# Patient Record
Sex: MALE | Race: WHITE | NOT HISPANIC OR LATINO | Employment: OTHER | URBAN - METROPOLITAN AREA
[De-identification: names, ages, dates, MRNs, and addresses within clinical notes are randomized per-mention and may not be internally consistent; named-entity substitution may affect disease eponyms.]

---

## 2017-01-31 ENCOUNTER — OFFICE VISIT (OUTPATIENT)
Dept: INTERNAL MEDICINE | Facility: CLINIC | Age: 67
End: 2017-01-31

## 2017-01-31 VITALS
BODY MASS INDEX: 28.99 KG/M2 | WEIGHT: 214 LBS | DIASTOLIC BLOOD PRESSURE: 80 MMHG | HEIGHT: 72 IN | RESPIRATION RATE: 14 BRPM | SYSTOLIC BLOOD PRESSURE: 130 MMHG

## 2017-01-31 DIAGNOSIS — Z00.00 ANNUAL PHYSICAL EXAM: Primary | ICD-10-CM

## 2017-01-31 DIAGNOSIS — Z13.228 SCREENING FOR METABOLIC DISORDER: ICD-10-CM

## 2017-01-31 DIAGNOSIS — Z12.5 PROSTATE CANCER SCREENING: ICD-10-CM

## 2017-01-31 DIAGNOSIS — M54.89 OTHER BACK PAIN: ICD-10-CM

## 2017-01-31 LAB
25(OH)D3 SERPL-MCNC: 47.4 NG/ML (ref 30–100)
ALBUMIN SERPL-MCNC: 4.02 G/DL (ref 3.4–4.6)
ALBUMIN/GLOB SERPL: 1.3 G/DL
ALP SERPL-CCNC: 84 U/L (ref 46–116)
ALT SERPL W P-5'-P-CCNC: 42 U/L (ref 16–63)
ANION GAP SERPL CALCULATED.3IONS-SCNC: 8 MMOL/L
AST SERPL-CCNC: 38 U/L (ref 7–37)
BACTERIA UR QL AUTO: ABNORMAL /HPF
BILIRUB SERPL-MCNC: 0.8 MG/DL (ref 0.2–1)
BILIRUB UR QL STRIP: NEGATIVE
BUN BLD-MCNC: 24 MG/DL (ref 6–22)
BUN/CREAT SERPL: 23.1 (ref 7–25)
CALCIUM SPEC-SCNC: 9 MG/DL (ref 8.6–10.5)
CHLORIDE SERPL-SCNC: 102 MMOL/L (ref 95–107)
CLARITY UR: CLEAR
CO2 SERPL-SCNC: 30 MMOL/L (ref 23–32)
COLOR UR: YELLOW
CREAT BLD-MCNC: 1.04 MG/DL (ref 0.7–1.3)
DEPRECATED RDW RBC AUTO: 44.5 FL (ref 37–54)
ERYTHROCYTE [DISTWIDTH] IN BLOOD BY AUTOMATED COUNT: 13.2 % (ref 11.5–15)
ERYTHROCYTE [SEDIMENTATION RATE] IN BLOOD: 3 MM/HR (ref 0–20)
FIBRINOGEN PPP-MCNC: 330 MG/DL (ref 219–464)
GFR SERPL CREATININE-BSD FRML MDRD: 72 ML/MIN/1.73
GLOBULIN UR ELPH-MCNC: 3.1 GM/DL
GLUCOSE BLD-MCNC: 96 MG/DL (ref 70–100)
GLUCOSE UR STRIP-MCNC: NEGATIVE MG/DL
HBA1C MFR BLD: 6 % (ref 4–6)
HCT VFR BLD AUTO: 46.3 % (ref 40.1–51)
HGB BLD-MCNC: 15.7 G/DL (ref 13.7–17.5)
HGB UR QL STRIP.AUTO: ABNORMAL
HYALINE CASTS UR QL AUTO: ABNORMAL /LPF
KETONES UR QL STRIP: NEGATIVE
LEUKOCYTE ESTERASE UR QL STRIP.AUTO: NEGATIVE
MCH RBC QN AUTO: 31.7 PG (ref 26–34)
MCHC RBC AUTO-ENTMCNC: 33.9 G/DL (ref 31–37)
MCV RBC AUTO: 93.5 FL (ref 80–100)
MUCOUS THREADS URNS QL MICRO: ABNORMAL /HPF
NITRITE UR QL STRIP: NEGATIVE
PH UR STRIP.AUTO: 6.5 [PH] (ref 5–8)
PLATELET # BLD AUTO: 223 10*3/MM3 (ref 150–450)
PMV BLD AUTO: 11.2 FL (ref 6–12)
POTASSIUM BLD-SCNC: 4.6 MMOL/L (ref 3.3–5.3)
PROT SERPL-MCNC: 7.1 G/DL (ref 6.3–8.4)
PROT UR QL STRIP: ABNORMAL
PSA SERPL-MCNC: 1.36 NG/ML (ref 0.13–4)
RBC # BLD AUTO: 4.95 10*6/MM3 (ref 4.63–6.08)
RBC # UR: ABNORMAL /HPF
REF LAB TEST METHOD: ABNORMAL
SODIUM BLD-SCNC: 140 MMOL/L (ref 136–145)
SP GR UR STRIP: 1.02 (ref 1–1.03)
SQUAMOUS #/AREA URNS HPF: ABNORMAL /HPF
TSH SERPL DL<=0.05 MIU/L-ACNC: 3.03 MIU/ML (ref 0.4–4.2)
UROBILINOGEN UR QL STRIP: ABNORMAL
WBC NRBC COR # BLD: 7.83 10*3/MM3 (ref 5–10)
WBC UR QL AUTO: ABNORMAL /HPF

## 2017-01-31 PROCEDURE — 83036 HEMOGLOBIN GLYCOSYLATED A1C: CPT | Performed by: INTERNAL MEDICINE

## 2017-01-31 PROCEDURE — 36415 COLL VENOUS BLD VENIPUNCTURE: CPT | Performed by: INTERNAL MEDICINE

## 2017-01-31 PROCEDURE — 84153 ASSAY OF PSA TOTAL: CPT | Performed by: INTERNAL MEDICINE

## 2017-01-31 PROCEDURE — 85651 RBC SED RATE NONAUTOMATED: CPT | Performed by: INTERNAL MEDICINE

## 2017-01-31 PROCEDURE — 99396 PREV VISIT EST AGE 40-64: CPT | Performed by: INTERNAL MEDICINE

## 2017-01-31 PROCEDURE — 80050 GENERAL HEALTH PANEL: CPT | Performed by: INTERNAL MEDICINE

## 2017-01-31 PROCEDURE — 93000 ELECTROCARDIOGRAM COMPLETE: CPT | Performed by: INTERNAL MEDICINE

## 2017-01-31 PROCEDURE — 81001 URINALYSIS AUTO W/SCOPE: CPT | Performed by: INTERNAL MEDICINE

## 2017-01-31 NOTE — MR AVS SNAPSHOT
Branden Brady   2017 10:00 AM   Office Visit    Dept Phone:  [phone]   Encounter #:  [id number]    Provider:  Dariana Dalton MD   Department:  [facility] INTERNAL MEDICINE                Your Full Care Plan              Today's Medication Changes          These changes are accurate as of: 17 11:16 AM.  If you have any questions, ask your nurse or doctor.               Stop taking medication(s)listed here:     BREO ELLIPTA IN   Stopped by:  Dariana Dalton MD                      Your Updated Medication List          This list is accurate as of: 17 11:16 AM.  Always use your most recent med list.                aspirin 81 MG EC tablet       MULTIVITAMIN ADULT PO               We Performed the Following     Ambulatory Referral to Physical Therapy Evaluate and treat       You Were Diagnosed With        Codes Comments    Annual physical exam    -  Primary ICD-10-CM: Z00.00  ICD-9-CM: V70.0     Prostate cancer screening     ICD-10-CM: Z12.5  ICD-9-CM: V76.44     Screening for metabolic disorder     ICD-10-CM: Z13.228  ICD-9-CM: V77.99     Other back pain     ICD-10-CM: M54.89       Instructions     None    Patient Instructions History      Upcoming Appointments     Visit Type Date Time Department    PHYSICAL 2017 10:00 AM JOSE PATEL Psonar Signup     [facility] allows you to send messages to your doctor, view your test results, renew your prescriptions, schedule appointments, and more. To sign up, go to [employer] and click on the Sign Up Now link in the New User? box. Enter your [facility] Activation Code exactly as it appears below along with the last four digits of your Social Security Number and your Date of Birth () to complete the sign-up process. If you do not sign up before the expiration date, you must request a new code.    [facility] Activation Code: [email or web address]  Expires: 2017 11:16 AM    If you  "have questions, you can email [email or web address] or call [phone] to talk to our MyChart staff. Remember, [employer] is NOT to be used for urgent needs. For medical emergencies, dial 911.               Other Info from Your Visit           Your Appointments     Feb 02, 2018 10:00 AM EST   Welcome to Medicare with Dariana Dalton MD   [facility] INTERNAL MEDICINE (--)    [address] [address] [phone]              Allergies     Aspirin  Other (See Comments)    Stomach upset    Penicillins        Reason for Visit     Annual Exam physical       Vital Signs     Blood Pressure Respirations Height Weight Body Mass Index Smoking Status    130/80 (BP Location: Left arm, Patient Position: Sitting, Cuff Size: Adult) 14 72\" (182.9 cm) 214 lb (97.1 kg) 29.02 kg/m2 Never Smoker      Problems and Diagnoses Noted     Annual physical exam    -  Primary    Screening for prostate cancer        Screening for metabolic disorder        Back pain            "

## 2017-01-31 NOTE — PROGRESS NOTES
Chief Complaint   Patient presents with   • Annual Exam     physical         Subjective   Branden Brady is a 66 y.o. male     HPI: He is here for annual examination.  He generally feels healthy.  His  appetite is good.  He  is following a prudent diet. He is sleeping well.  His  energy is good.   He exercises regularly - either runs or swims.  He still has some shortness of breath with exertion. Pulmonary recommended an albuterol inhaler.  He sometimes uses it. Sometimes his back bothers him when he runs.  Takes ibuprofen as needed. Helps some.     In addition to the usual lab work we do for a complete physical, there are several other tests he would like done.  He has not had any specific symptoms that lead him to believe that there is anything wrong.  However, he has done some reading and would like these labs to be done.  He would like a DHEA level, estradiol level, fibrinogen, free testosterone, hemoglobin A1c.  He has had cardiac C-reactive protein, sedimentation rate, TSH, vitamin D, NMR lipo panel and homocystine levels done with past lab work and he would like those done as well.        The following portions of the patient's history were reviewed and updated as appropriate: allergies, current medications, past social history, problem list, past surgical history    Review of Systems   Constitutional: Negative for appetite change, chills, fatigue and fever.   HENT: Negative for congestion, ear pain, sinus pressure, sneezing, sore throat, tinnitus, trouble swallowing and voice change.    Eyes: Negative for visual disturbance.   Respiratory: Negative for cough and shortness of breath.    Cardiovascular: Negative for chest pain, palpitations and leg swelling.   Gastrointestinal: Negative for abdominal pain, constipation, diarrhea, nausea and vomiting.   Endocrine: Negative for cold intolerance, heat intolerance, polydipsia and polyuria.   Genitourinary: Negative for discharge, dysuria, frequency and scrotal  "swelling.        No ED   Musculoskeletal: Positive for back pain. Negative for arthralgias and gait problem.   Skin: Negative for rash.   Neurological: Negative for dizziness, syncope and headaches.   Hematological: Negative for adenopathy. Does not bruise/bleed easily.   Psychiatric/Behavioral: Negative for decreased concentration, dysphoric mood and sleep disturbance. The patient is not nervous/anxious.        Visit Vitals   • /80 (BP Location: Left arm, Patient Position: Sitting, Cuff Size: Adult)   • Resp 14   • Ht 72\" (182.9 cm)   • Wt 214 lb (97.1 kg)   • BMI 29.02 kg/m2        Objective   Physical Exam   Constitutional: He is oriented to person, place, and time. He appears well-developed and well-nourished. No distress.   HENT:   Right Ear: Hearing, tympanic membrane, external ear and ear canal normal.   Left Ear: Hearing, tympanic membrane, external ear and ear canal normal.   Nose: Right sinus exhibits no maxillary sinus tenderness and no frontal sinus tenderness. Left sinus exhibits no maxillary sinus tenderness and no frontal sinus tenderness.   Eyes: Conjunctivae, EOM and lids are normal. Pupils are equal, round, and reactive to light.   Neck: Trachea normal and phonation normal. Neck supple. Normal carotid pulses and no JVD present. Carotid bruit is not present. No thyroid mass and no thyromegaly present.   Cardiovascular: Normal rate, regular rhythm, S1 normal and S2 normal.  PMI is not displaced.  Exam reveals no gallop and no friction rub.    No murmur heard.  Pulses:       Carotid pulses are 2+ on the right side, and 2+ on the left side.       Radial pulses are 2+ on the right side, and 2+ on the left side.        Dorsalis pedis pulses are 2+ on the right side, and 2+ on the left side.   Pulmonary/Chest: Effort normal and breath sounds normal. He has no wheezes. He has no rhonchi. He has no rales. Chest wall is not dull to percussion.   Abdominal: Soft. Normal appearance, normal aorta and " bowel sounds are normal. He exhibits no abdominal bruit and no mass. There is no hepatosplenomegaly. There is no tenderness. Hernia confirmed negative in the right inguinal area and confirmed negative in the left inguinal area.   Genitourinary: Rectum normal, testes normal and penis normal. Rectal exam shows no mass and guaiac negative stool. Prostate is not enlarged and not tender.   Musculoskeletal: Normal range of motion. He exhibits no edema or tenderness.   Lymphadenopathy:     He has no cervical adenopathy.     He has no axillary adenopathy.        Left: No supraclavicular adenopathy present.   Neurological: He is alert and oriented to person, place, and time. He has normal strength and normal reflexes. No cranial nerve deficit or sensory deficit. Coordination and gait normal.   Skin: Skin is warm and dry. No rash noted.   Psychiatric: He has a normal mood and affect. His speech is normal and behavior is normal. Judgment and thought content normal. Cognition and memory are normal. He is attentive.   Nursing note and vitals reviewed.      ECG 12 Lead  Date/Time: 1/31/2017 10:56 AM  Performed by: JULIÁN NAIDU  Authorized by: CRISTIN NARANJO   Previous ECG: no previous ECG available  Rhythm: sinus bradycardia  Rate: bradycardic  BPM: 47  Conduction: conduction normal  ST Segments: ST segments normal  T Waves: T waves normal  QRS axis: normal  Clinical impression: normal ECG  Comments: Irregular sinus bradycardia                Assessment/Plan   Problem List Items Addressed This Visit     None      Visit Diagnoses     Annual physical exam    -  Primary    Relevant Orders    Comprehensive Metabolic Panel    CBC & Differential    Urinalysis With / Microscopic If Indicated    PSA    High Sensitivity CRP    Homocysteine, serum    Sedimentation Rate    Hemoglobin A1c    TSH    Vitamin D 25 Hydroxy    Fibrinogen    DHEA level    Testosterone Free Direct    Estradiol    Lipoprotein NMR    CBC Auto Differential     Lipoprotein NMR    Prostate cancer screening        Relevant Orders    PSA    Screening for metabolic disorder        Relevant Orders    Vitamin D 25 Hydroxy    Other back pain        Relevant Orders    Ambulatory Referral to Physical Therapy Evaluate and treat (Completed)

## 2017-02-01 LAB
BASOPHILS # BLD MANUAL: 0 10*3/MM3 (ref 0–0.2)
BASOPHILS NFR BLD AUTO: 0 % (ref 0–2)
CHOLEST SERPL-MCNC: 261 MG/DL (ref 100–199)
CRP SERPL HS-MCNC: 1 MG/L (ref 0–3)
EOSINOPHIL # BLD MANUAL: 0.23 10*3/MM3 (ref 0–0.7)
EOSINOPHIL NFR BLD MANUAL: 3 % (ref 0–5)
ESTRADIOL SERPL HS-MCNC: 17.8 PG/ML (ref 7.6–42.6)
HCYS SERPL-SCNC: 10.3 UMOL/L (ref 0–15)
HDL SERPL-SCNC: 40.8 UMOL/L
HDLC SERPL-MCNC: 79 MG/DL
LDL-P: 1523 NMOL/L
LDLC REAL SIZE PAT SERPL: 22 NM
LDLC SERPL CALC-MCNC: 167 MG/DL (ref 0–99)
LP-IR SCORE**: <25
LYMPHOCYTES # BLD MANUAL: 2.43 10*3/MM3 (ref 0.8–7)
LYMPHOCYTES NFR BLD MANUAL: 31 % (ref 10–60)
LYMPHOCYTES NFR BLD MANUAL: 9 % (ref 0–13)
MONOCYTES # BLD AUTO: 0.7 10*3/MM3 (ref 0–1)
NEUTROPHILS # BLD AUTO: 4.07 10*3/MM3 (ref 1–11)
NEUTROPHILS NFR BLD MANUAL: 52 % (ref 30–85)
PLAT MORPH BLD: NORMAL
RBC MORPH BLD: NORMAL
SCAN SLIDE: NORMAL
SMALL LDL-P: 253 NMOL/L
TRIGL SERPL-MCNC: 73 MG/DL (ref 0–149)
VARIANT LYMPHS NFR BLD MANUAL: 5 % (ref 0–5)
WBC MORPH BLD: NORMAL

## 2017-02-02 LAB — TESTOST FREE SERPL-MCNC: 5.2 PG/ML (ref 6.6–18.1)

## 2017-02-03 LAB — DHEA SERPL-MCNC: 122 NG/DL (ref 31–701)

## 2017-02-09 ENCOUNTER — TELEPHONE (OUTPATIENT)
Dept: INTERNAL MEDICINE | Facility: CLINIC | Age: 67
End: 2017-02-09

## 2017-02-09 NOTE — TELEPHONE ENCOUNTER
Patient called and left detailed message. Patient states Physical therapy would like patient to get a lumbar xray of PA and Lateral views. Due to patient being a runner, physical therapy states they would like to take a further look. Please advise and order if approved

## 2017-02-10 ENCOUNTER — APPOINTMENT (OUTPATIENT)
Dept: WOMENS IMAGING | Facility: HOSPITAL | Age: 67
End: 2017-02-10

## 2017-02-10 DIAGNOSIS — M54.89 OTHER BACK PAIN: Primary | ICD-10-CM

## 2017-02-10 PROCEDURE — 72050 X-RAY EXAM NECK SPINE 4/5VWS: CPT | Performed by: INTERNAL MEDICINE

## 2017-02-10 PROCEDURE — 72110 X-RAY EXAM L-2 SPINE 4/>VWS: CPT | Performed by: INTERNAL MEDICINE

## 2017-02-10 PROCEDURE — 72110 X-RAY EXAM L-2 SPINE 4/>VWS: CPT | Performed by: RADIOLOGY

## 2017-02-10 PROCEDURE — 72070 X-RAY EXAM THORAC SPINE 2VWS: CPT | Performed by: INTERNAL MEDICINE

## 2017-02-16 ENCOUNTER — TELEPHONE (OUTPATIENT)
Dept: INTERNAL MEDICINE | Facility: CLINIC | Age: 67
End: 2017-02-16

## 2017-02-16 NOTE — TELEPHONE ENCOUNTER
Patient is requesting an order for a Bone Density. Last one was done on 10/16/2012.    Please advise.    Thank you,    Vom

## 2017-02-16 NOTE — TELEPHONE ENCOUNTER
"----- Message from Maxine Burrell sent at 2/16/2017 12:11 PM EST -----  Patient is requesting a Bone Density. Patient stated he was not for sure when his last one was,but he \"had his last one here.\" Found his last one in [facility] in 10/2012. He would like to do this \"in the next few days when he picks up his XRay films.\" Thank you    [phone] (W)  [phone] (H)    "

## 2017-02-17 ENCOUNTER — CLINICAL SUPPORT (OUTPATIENT)
Dept: INTERNAL MEDICINE | Facility: CLINIC | Age: 67
End: 2017-02-17

## 2017-02-17 ENCOUNTER — HOSPITAL ENCOUNTER (OUTPATIENT)
Dept: PHYSICAL THERAPY | Facility: HOSPITAL | Age: 67
Setting detail: THERAPIES SERIES
Discharge: HOME | End: 2017-02-17

## 2017-02-17 DIAGNOSIS — M54.50 LUMBAR SPINE PAIN: ICD-10-CM

## 2017-02-17 DIAGNOSIS — Z13.820 SCREENING FOR OSTEOPOROSIS: ICD-10-CM

## 2017-02-17 DIAGNOSIS — M54.6 THORACIC SPINE PAIN: ICD-10-CM

## 2017-02-17 DIAGNOSIS — M48.54XG COMPRESSION FRACTURE OF THORACIC SPINE, NON-TRAUMATIC, WITH DELAYED HEALING, SUBSEQUENT ENCOUNTER: Primary | ICD-10-CM

## 2017-02-17 DIAGNOSIS — Z13.820 SCREENING FOR OSTEOPOROSIS: Primary | ICD-10-CM

## 2017-02-17 PROCEDURE — 97110 THERAPEUTIC EXERCISES: CPT | Performed by: PHYSICAL THERAPIST

## 2017-02-17 PROCEDURE — 97162 PT EVAL MOD COMPLEX 30 MIN: CPT | Performed by: PHYSICAL THERAPIST

## 2017-02-17 PROCEDURE — 77080 DXA BONE DENSITY AXIAL: CPT | Performed by: INTERNAL MEDICINE

## 2017-02-17 NOTE — PROGRESS NOTES
Outpatient Physical Therapy Ortho Initial Evaluation  [facility]     Patient Name: Branden Brady  : 1950  MRN: [id number]  Today's Date: 2017      Visit Date: 2017    Patient Active Problem List   Diagnosis   • Hyperlipidemia        Past Medical History   Diagnosis Date   • Arthritis      Cervical, thoracic and lumbar spine   • Dry eyes    • Exertional shortness of breath    • Family history of ischemic heart disease    • Hyperlipidemia    • Osteopenia    • Otalgia    • Subdeltoid bursitis    • Thoracic compression fracture         Past Surgical History   Procedure Laterality Date   • Shoulder surgery Right 2013     Right        Visit Dx:     ICD-10-CM ICD-9-CM   1. Compression fracture of thoracic spine, non-traumatic, with delayed healing, subsequent encounter M48.54XG V54.27   2. Lumbar spine pain M54.5 724.2   3. Thoracic spine pain M54.6 724.1             Patient History       17 1400          History    Chief Complaint Difficulty with daily activities;Pain  -ZK      Type of Pain Back pain  -ZK      Date Current Problem(s) Began --   insiduous early mid   -ZK      Brief Description of Current Complaint Patient with progressive back pain over the past 1-2 years really not feeling 100% since falling on the ice during spring of 2013. Landed on his right side and required rotator cuff surgery 2013 which went well and he quickly returned to running 2 months post surgery. However, in the past year his running has backed off from one hour to just 20 minutes before reporting a nagging mid to low back pain. At times able to stop and walk and then run again, but recently not able to  running again. Pt called 2 weeks ago to discuss his symptoms and I suggested a lumbar film be taken prior to PT eval. MD Darnell ordered a complete spine series and pt found to have DDD, scoliosis, and a T9 comp fx which is mild and age unknown. MD ordered bone density scan which pt  will have done next week. Pt continues to run in spite of this and looking for direction as to how to help his spine pain but keep running as possible.   -ZK      Patient/Caregiver Goals Return to prior level of function;Relieve pain;Know what to do to help the symptoms  -ZK      Patient's Rating of General Health Very good  -ZK      Occupation/sports/leisure activities Retired pharmacist for quite some time. Loves to run and now swimming some  -ZK      What clinical tests have you had for this problem? X-ray   see report in file regarding level of DDD, scoliosis,and fx  -ZK      Pain     Pain Location Back  -ZK      Pain at Present 1  -ZK      Pain at Best 0  -ZK      Pain at Worst 5  -ZK      What Performance Factors Make the Current Problem(s) WORSE? running (also see YUMIKO)  -ZK      What Performance Factors Make the Current Problem(s) BETTER? rest and back extension  -ZK      Pain Comments no night pain. no wt loss. did not report family hx spine pain  -ZK      Is your sleep disturbed? No  -ZK      Fall Risk Assessment    Any falls in the past year: No  -ZK      Daily Activities    Primary Language English  -ZK      Barriers to learning None  -ZK      Safety    Are you being hurt, hit, or frightened by anyone at home or in your life? No  -ZK      Are you being neglected by a caregiver No  -ZK        User Key  (r) = Recorded By, (t) = Taken By, (c) = Cosigned By    Initials Name Provider Type    ZK Zorre Zeno Kimura, PT Physical Therapist                PT Ortho       02/17/17 1500    Subjective Comments    Subjective Comments agrees to cut back on distance of running to 50% of current level until bone density study is in .   -ZK    Posture/Observations    Alignment Options Thoracic kyphosis;Scoliosis   mild mod T convex to the right and mild convex Lumb L  -ZK    Thoracic Kyphosis Mild  -ZK    Sensation    Sensation WNL? WNL  -ZK    Additional Comments no sensitivity to pressure at T8-12  -ZK    Quarter Clearing     Quarter Clearing Lower Quarter Clearing   hips clear  -ZK    ROM (Range of Motion)    General ROM head/neck/trunk range of motion deficits identified  -ZK    General ROM Detail 20 degree loss flexion with reduced lumbar mobility. 50% SB and ext from norm  -ZK    Flexibility    Flexibility Tested? Lower Extremity   -20 hamstring flex bilat  -ZK    Pathomechanics    Spine Pathomechanics Excessive thoracic kyphosis with forward bend;Limited lumbar flattening with forward bend  -ZK      User Key  (r) = Recorded By, (t) = Taken By, (c) = Cosigned By    Initials Name Provider Type    Z Zorre Zeno Kimura, PT Physical Therapist                                PT OP Goals       02/17/17 1500          PT Short Term Goals    STG Date to Achieve 03/17/17  -ZK      STG 1 Improve c/o pain with and after running from 5 to 3/10 or better  -ZK      STG 1 Progress New  -ZK      STG 2 Pt to be indep with light moderate program based on bone density findings  -ZK      STG 2 Progress New  -K      STG 3 Pt to state improved spinal ROM, mik with flexion  -ZK      STG 3 Progress New  -K      Long Term Goals    LTG Date to Achieve 04/17/17  -ZK      LTG 1 pt to return to running 30-40 minutes with no post ex LBP  -ZK      LTG 2 YUMIKO score to improve from 28 to < 18% by dc  -ZK      LTG 3 pt to be indep with moderate to advanced back rehab program  -ZK      LTG 4 pt to be satisfied with variety of ex to supplement and possible replace amount of running per week  -ZK      Time Calculation    PT Goal Re-Cert Due Date 03/17/17  -ZK        User Key  (r) = Recorded By, (t) = Taken By, (c) = Cosigned By    Initials Name Provider Type    K Zorre Zeno Kimura, PT Physical Therapist                PT Assessment/Plan       02/17/17 1509          PT Assessment    Functional Limitations Limitations in functional capacity and performance;Limitations in community activities;Performance in leisure activities;Performance in sport activities  -Z       Impairments Posture;Locomotion;Pain;Impaired flexibility  -ZK      Assessment Comments Pt with no real back issues until falling 3 years ago injuring his right shoulder and never regained his mobility or ability to run as before. Now with mild comp fx at T9 most likely insiduous from 2016 but prognosis and plan for ex will depend a bit on pending bone density study. Will see pt once a week the next month to advance program and pt may join this wellness center for alternative ex while getting some answers for his new condition/comp fx.   -RATNA      Please refer to paper survey for additional self-reported information Yes  -ZK      Rehab Potential Good  -ADDIEK      Patient/caregiver participated in establishment of treatment plan and goals Yes  -ZK      Patient would benefit from skilled therapy intervention Yes  -ZK      PT Plan    PT Frequency 1x/week  -ADDIEK      Predicted Duration of Therapy Intervention (days/wks) 4-6 weeks  -ADDIEK      Planned CPT's? PT AQUATIC THERAPY EA 15 MIN: 07231;PT MANUAL THERAPY EA 15 MIN: 23993;PT THER PROC EA 15 MIN: 17176  -ADDIEK        User Key  (r) = Recorded By, (t) = Taken By, (c) = Cosigned By    Initials Name Provider Type    ZK Zorre Zeno Kimura, PT Physical Therapist                  Exercises       02/17/17 1500          Subjective Comments    Subjective Comments agrees to cut back on distance of running to 50% of current level until bone density study is in .   -RATNA      Exercise 1    Exercise Name 1 see HEP in chart. started on hamstring flex, hook marches for core, trunk rotation supine. DKTC, cat and camel and child's pose with SB.  -RATNA        User Key  (r) = Recorded By, (t) = Taken By, (c) = Cosigned By    Initials Name Provider Type    ZK Zorre Zeno Kimura, PT Physical Therapist                              Outcome Measures       02/17/17 1500          Modified Oswestry    Modified Oswestry Score/Comments 28  -ZNATALI      Functional Assessment    Outcome Measure Options Gonsalo Whatley   -RATNA        User Key  (r) = Recorded By, (t) = Taken By, (c) = Cosigned By    Initials Name Provider Type    ZK Zorre Zeno Kimura, PT Physical Therapist            Time Calculation:   Start Time: 1315  Stop Time: 1415  Time Calculation (min): 60 min     Therapy Charges for Today     Code Description Service Date Service Provider Modifiers Qty    70904382135 HC PT THER PROC EA 15 MIN 2/17/2017 Zorre Zeno Kimura, PT GP 1    35157637804 HC PT EVAL MOD COMPLEXITY 3 2/17/2017 Zorre Zeno Kimura, PT GP 1          PT G-Codes  Outcome Measure Options: Modifed Owestry         Zorre Zeno Kimura, PT  2/17/2017

## 2017-02-17 NOTE — TELEPHONE ENCOUNTER
Judy,    Can you please call Doug Caitlyn and schedule him for a bone density. (the order is in the chart)    Thank you,    Von

## 2017-02-20 ENCOUNTER — TELEPHONE (OUTPATIENT)
Dept: INTERNAL MEDICINE | Facility: CLINIC | Age: 67
End: 2017-02-20

## 2017-02-20 NOTE — TELEPHONE ENCOUNTER
----- Message from Nirali Tirado MA sent at 2/20/2017  4:57 PM EST -----  Contact: pt      ----- Message -----     From: Judy Page     Sent: 2/20/2017   1:15 PM       To: Nirali Tirado MA    Pt calling would like a call back to discuss x-ray results and bone density results with Dr. Dalton when she returns. Please advise.     #[phone]      Dr. Dalton

## 2017-02-21 DIAGNOSIS — M54.89 OTHER BACK PAIN: Primary | ICD-10-CM

## 2017-02-21 NOTE — TELEPHONE ENCOUNTER
Discussed with him.  He has many questions, concerns.  Explained he has mild osteopenia.  There is evidence of a mild vertebral fracture of one thoracic vertebra.  He has back aching when he runs.  He does not want to stop running.  Recommended sports medicine consultation.

## 2017-02-24 ENCOUNTER — HOSPITAL ENCOUNTER (OUTPATIENT)
Dept: PHYSICAL THERAPY | Facility: HOSPITAL | Age: 67
Setting detail: THERAPIES SERIES
Discharge: HOME | End: 2017-02-24

## 2017-02-24 DIAGNOSIS — M48.54XG COMPRESSION FRACTURE OF THORACIC SPINE, NON-TRAUMATIC, WITH DELAYED HEALING, SUBSEQUENT ENCOUNTER: Primary | ICD-10-CM

## 2017-02-24 DIAGNOSIS — M54.50 LUMBAR SPINE PAIN: ICD-10-CM

## 2017-02-24 DIAGNOSIS — M54.6 THORACIC SPINE PAIN: ICD-10-CM

## 2017-02-24 PROCEDURE — 97110 THERAPEUTIC EXERCISES: CPT | Performed by: PHYSICAL THERAPIST

## 2017-02-24 NOTE — PROGRESS NOTES
Outpatient Physical Therapy Ortho Treatment Note  [facility]     Patient Name: Branden Brady  : 1950  MRN: [id number]  Today's Date: 2017      Visit Date: 2017    Visit Dx:    ICD-10-CM ICD-9-CM   1. Compression fracture of thoracic spine, non-traumatic, with delayed healing, subsequent encounter M48.54XG V54.27   2. Lumbar spine pain M54.5 724.2   3. Thoracic spine pain M54.6 724.1       Patient Active Problem List   Diagnosis   • Hyperlipidemia        Past Medical History   Diagnosis Date   • Arthritis      Cervical, thoracic and lumbar spine   • Dry eyes    • Exertional shortness of breath    • Family history of ischemic heart disease    • Hyperlipidemia    • Osteopenia    • Otalgia    • Subdeltoid bursitis    • Thoracic compression fracture         Past Surgical History   Procedure Laterality Date   • Shoulder surgery Right      Right                              PT Assessment/Plan       17 1238       PT Assessment    Assessment Comments see bone density findings for details regarding risk and recommendations. pt does have osteopenia and he is still not sure of the status of his T9 comp fx and called the radilogist who had the report but not the film so really did not get a full answer. pt will now pursue ortho or neurosurgical opinion just to get a more concise picture about his prognosis and comp fx. Pt with low normal calcium levels on recent CBC so should be able to pursue the 500mg dosage recommended for osteopenia.  He will ask his  more about this as needed.   -RATNA       User Key  (r) = Recorded By, (t) = Taken By, (c) = Cosigned By    Initials Name Provider Type    ZK Zorre Zeno Kimura, PT Physical Therapist                    Exercises       17 1200          Subjective Comments    Subjective Comments talked to MD after bone density and finding in Lumbar spine show moderate fx risk and MD advised optimal use of calcium right now, but also okay to confer with  orthopedist if needed. plans on joining this gym for 3 months thus started on learning about do's and don'ts here.  -ZK      Subjective Pain    Able to rate subjective pain? yes  -ZK      Pre-Treatment Pain Level 3  -ZK      Post-Treatment Pain Level 2  -ZK      Exercise 1    Exercise Name 1 intro to spinning benefits and seat set up to prevent kyphotic posture. benefits of TRX and pilates classes. intro to some free motion machines for core and mid back work. also how to use fins for swimming to prevent shoulder injury.   -ADDIEK        User Key  (r) = Recorded By, (t) = Taken By, (c) = Cosigned By    Initials Name Provider Type    ZK Zorre Zeno Kimura, VARGHESE Physical Therapist                                       Time Calculation:   Start Time: 1045  Stop Time: 1130  Time Calculation (min): 45 min    Therapy Charges for Today     Code Description Service Date Service Provider Modifiers Qty    27408505410 HC PT THER PROC EA 15 MIN 2/24/2017 Zorre Zeno Kimura, PT GP 3                    Zorre Zeno Kimura, PT  2/24/2017

## 2017-03-03 ENCOUNTER — APPOINTMENT (OUTPATIENT)
Dept: PHYSICAL THERAPY | Facility: HOSPITAL | Age: 67
End: 2017-03-03

## 2017-03-10 ENCOUNTER — APPOINTMENT (OUTPATIENT)
Dept: PHYSICAL THERAPY | Facility: HOSPITAL | Age: 67
End: 2017-03-10

## 2017-04-07 ENCOUNTER — DOCUMENTATION (OUTPATIENT)
Dept: PHYSICAL THERAPY | Facility: HOSPITAL | Age: 67
End: 2017-04-07

## 2017-04-07 NOTE — THERAPY DISCHARGE NOTE
Outpatient Physical Therapy Discharge Summary         Patient Name: Branden Brady  : 1950  MRN: [id number]    Today's Date: 2017    Visit Dx:  No diagnosis found.          PT OP Goals       17 1430       PT Short Term Goals    STG Date to Achieve 17  -ZK     STG 1 Improve c/o pain with and after running from 5 to 3/10 or better  -ZK     STG 1 Progress Progressing  -ZK     STG 2 Pt to be indep with light moderate program based on bone density findings  -ZK     STG 2 Progress Ongoing  -ZK     STG 3 Pt to state improved spinal ROM, mik with flexion  -ZK     STG 3 Progress Met  -ZK     Long Term Goals    LTG Date to Achieve 17  -ZK     LTG 1 pt to return to running 30-40 minutes with no post ex LBP  -ZK     LTG 1 Progress Ongoing  -ZK     LTG 2 YUMIKO score to improve from 28 to < 18% by dc  -ZK     LTG 2 Progress Ongoing  -ZK     LTG 3 pt to be indep with moderate to advanced back rehab program  -ZK     LTG 3 Progress Not Met  -ZK     LTG 4 pt to be satisfied with variety of ex to supplement and possible replace amount of running per week  -ZK     LTG 4 Progress Not Met  -ZK       User Key  (r) = Recorded By, (t) = Taken By, (c) = Cosigned By    Initials Name Provider Type    ZK Zorre Zeno Kimura, PT Physical Therapist          OP PT Discharge Summary  Date of Discharge: 17  Reason for Discharge: Patient/Caregiver request  Outcomes Achieved: Patient able to partially acheive established goals  Discharge Destination: Home without follow-up  Discharge Instructions: pt seen for eval and ex advice post chronic then progressive mid back pain found to have a T9 comp fx of unknown chronicity. Pt accepted some new exercises but slow to accept that he may have to reduce and even stop running in order to be pain free and not sustain other injuries. Pt also underwent a bone density scan and due to many questions he had regarding his findings on x-ray and general prognosis, I recommended he  consult further with an ortho and/or spine MD for more answers. Pt did not follow up for more advanced ex or suggestions how to progress running program, thus final condition??      Time Calculation:                    Zorre Zeno Kimura, PT  4/7/2017

## 2017-05-31 ENCOUNTER — OFFICE VISIT (OUTPATIENT)
Dept: NEUROSURGERY | Facility: CLINIC | Age: 67
End: 2017-05-31

## 2017-05-31 VITALS
HEART RATE: 66 BPM | DIASTOLIC BLOOD PRESSURE: 89 MMHG | HEIGHT: 72 IN | WEIGHT: 210 LBS | SYSTOLIC BLOOD PRESSURE: 143 MMHG | BODY MASS INDEX: 28.44 KG/M2

## 2017-05-31 DIAGNOSIS — G89.29 CHRONIC MIDLINE LOW BACK PAIN WITHOUT SCIATICA: ICD-10-CM

## 2017-05-31 DIAGNOSIS — M51.36 DDD (DEGENERATIVE DISC DISEASE), LUMBAR: Primary | ICD-10-CM

## 2017-05-31 DIAGNOSIS — M54.50 CHRONIC MIDLINE LOW BACK PAIN WITHOUT SCIATICA: ICD-10-CM

## 2017-05-31 PROBLEM — M51.369 DDD (DEGENERATIVE DISC DISEASE), LUMBAR: Status: ACTIVE | Noted: 2017-05-31

## 2017-05-31 PROCEDURE — 99203 OFFICE O/P NEW LOW 30 MIN: CPT | Performed by: NEUROLOGICAL SURGERY

## 2018-02-02 ENCOUNTER — OFFICE VISIT (OUTPATIENT)
Dept: INTERNAL MEDICINE | Facility: CLINIC | Age: 68
End: 2018-02-02

## 2018-02-02 VITALS
DIASTOLIC BLOOD PRESSURE: 72 MMHG | SYSTOLIC BLOOD PRESSURE: 114 MMHG | BODY MASS INDEX: 28.44 KG/M2 | WEIGHT: 210 LBS | HEIGHT: 72 IN

## 2018-02-02 DIAGNOSIS — Z00.00 WELCOME TO MEDICARE PREVENTIVE VISIT: Primary | ICD-10-CM

## 2018-02-02 DIAGNOSIS — Z12.5 PROSTATE CANCER SCREENING: ICD-10-CM

## 2018-02-02 DIAGNOSIS — E78.5 HYPERLIPIDEMIA, UNSPECIFIED HYPERLIPIDEMIA TYPE: ICD-10-CM

## 2018-02-02 LAB
ALBUMIN SERPL-MCNC: 4.5 G/DL (ref 3.5–5.2)
ALBUMIN/GLOB SERPL: 1.7 G/DL
ALP SERPL-CCNC: 54 U/L (ref 39–117)
ALT SERPL W P-5'-P-CCNC: 32 U/L (ref 1–41)
ANION GAP SERPL CALCULATED.3IONS-SCNC: 9.5 MMOL/L
AST SERPL-CCNC: 40 U/L (ref 1–40)
BACTERIA UR QL AUTO: ABNORMAL /HPF
BASOPHILS # BLD AUTO: 0.03 10*3/MM3 (ref 0–0.2)
BASOPHILS NFR BLD AUTO: 0.4 % (ref 0–2)
BILIRUB SERPL-MCNC: 0.6 MG/DL (ref 0.1–1.2)
BILIRUB UR QL STRIP: NEGATIVE
BUN BLD-MCNC: 19 MG/DL (ref 8–23)
BUN/CREAT SERPL: 18.8 (ref 7–25)
CALCIUM SPEC-SCNC: 9.3 MG/DL (ref 8.6–10.5)
CHLORIDE SERPL-SCNC: 103 MMOL/L (ref 98–107)
CHOLEST SERPL-MCNC: 256 MG/DL (ref 0–200)
CLARITY UR: CLEAR
CO2 SERPL-SCNC: 30.5 MMOL/L (ref 22–29)
COLOR UR: YELLOW
CREAT BLD-MCNC: 1.01 MG/DL (ref 0.76–1.27)
CRP SERPL-MCNC: 0.1 MG/DL (ref 0.01–0.5)
DEPRECATED RDW RBC AUTO: 45 FL (ref 37–54)
EOSINOPHIL # BLD AUTO: 0.2 10*3/MM3 (ref 0–0.7)
EOSINOPHIL NFR BLD AUTO: 2.9 % (ref 0–5)
ERYTHROCYTE [DISTWIDTH] IN BLOOD BY AUTOMATED COUNT: 13.4 % (ref 11.5–15)
ERYTHROCYTE [SEDIMENTATION RATE] IN BLOOD: 2 MM/HR (ref 0–20)
FIBRINOGEN PPP-MCNC: 338 MG/DL (ref 219–464)
GFR SERPL CREATININE-BSD FRML MDRD: 74 ML/MIN/1.73
GLOBULIN UR ELPH-MCNC: 2.6 GM/DL
GLUCOSE BLD-MCNC: 88 MG/DL (ref 65–99)
GLUCOSE UR STRIP-MCNC: NEGATIVE MG/DL
HCT VFR BLD AUTO: 46.7 % (ref 40.1–51)
HDLC SERPL-MCNC: 90 MG/DL (ref 40–60)
HGB BLD-MCNC: 15.8 G/DL (ref 13.7–17.5)
HGB UR QL STRIP.AUTO: ABNORMAL
HYALINE CASTS UR QL AUTO: ABNORMAL /LPF
KETONES UR QL STRIP: NEGATIVE
LDLC SERPL CALC-MCNC: 157 MG/DL (ref 0–100)
LDLC/HDLC SERPL: 1.74 {RATIO}
LEUKOCYTE ESTERASE UR QL STRIP.AUTO: NEGATIVE
LYMPHOCYTES # BLD AUTO: 2.09 10*3/MM3 (ref 0.8–7)
LYMPHOCYTES NFR BLD AUTO: 30.4 % (ref 10–60)
MCH RBC QN AUTO: 31.9 PG (ref 26–34)
MCHC RBC AUTO-ENTMCNC: 33.8 G/DL (ref 31–37)
MCV RBC AUTO: 94.2 FL (ref 80–100)
MONOCYTES # BLD AUTO: 0.7 10*3/MM3 (ref 0–1)
MONOCYTES NFR BLD AUTO: 10.2 % (ref 0–13)
MUCOUS THREADS URNS QL MICRO: ABNORMAL /HPF
NEUTROPHILS # BLD AUTO: 3.86 10*3/MM3 (ref 1–11)
NEUTROPHILS NFR BLD AUTO: 56.1 % (ref 30–85)
NITRITE UR QL STRIP: NEGATIVE
PH UR STRIP.AUTO: 6 [PH] (ref 5–8)
PLATELET # BLD AUTO: 217 10*3/MM3 (ref 150–450)
PMV BLD AUTO: 10.9 FL (ref 6–12)
POTASSIUM BLD-SCNC: 4.5 MMOL/L (ref 3.5–5.2)
PROT SERPL-MCNC: 7.1 G/DL (ref 6–8.5)
PROT UR QL STRIP: NEGATIVE
PSA SERPL-MCNC: 2.13 NG/ML (ref 0–4)
RBC # BLD AUTO: 4.96 10*6/MM3 (ref 4.63–6.08)
RBC # UR: ABNORMAL /HPF
REF LAB TEST METHOD: ABNORMAL
SODIUM BLD-SCNC: 143 MMOL/L (ref 136–145)
SP GR UR STRIP: 1.02 (ref 1–1.03)
SQUAMOUS #/AREA URNS HPF: ABNORMAL /HPF
T4 FREE SERPL-MCNC: 0.95 NG/DL (ref 0.93–1.7)
TRIGL SERPL-MCNC: 46 MG/DL (ref 0–150)
TSH SERPL DL<=0.05 MIU/L-ACNC: 4.37 MIU/ML (ref 0.27–4.2)
UROBILINOGEN UR QL STRIP: ABNORMAL
VLDLC SERPL-MCNC: 9.2 MG/DL (ref 5–40)
WBC NRBC COR # BLD: 6.88 10*3/MM3 (ref 5–10)
WBC UR QL AUTO: ABNORMAL /HPF

## 2018-02-02 PROCEDURE — 80061 LIPID PANEL: CPT | Performed by: INTERNAL MEDICINE

## 2018-02-02 PROCEDURE — 84439 ASSAY OF FREE THYROXINE: CPT | Performed by: INTERNAL MEDICINE

## 2018-02-02 PROCEDURE — 86141 C-REACTIVE PROTEIN HS: CPT | Performed by: INTERNAL MEDICINE

## 2018-02-02 PROCEDURE — 85025 COMPLETE CBC W/AUTO DIFF WBC: CPT | Performed by: INTERNAL MEDICINE

## 2018-02-02 PROCEDURE — G0402 INITIAL PREVENTIVE EXAM: HCPCS | Performed by: INTERNAL MEDICINE

## 2018-02-02 PROCEDURE — 84443 ASSAY THYROID STIM HORMONE: CPT | Performed by: INTERNAL MEDICINE

## 2018-02-02 PROCEDURE — 80053 COMPREHEN METABOLIC PANEL: CPT | Performed by: INTERNAL MEDICINE

## 2018-02-02 PROCEDURE — 85652 RBC SED RATE AUTOMATED: CPT | Performed by: INTERNAL MEDICINE

## 2018-02-02 PROCEDURE — G0102 PROSTATE CA SCREENING; DRE: HCPCS | Performed by: INTERNAL MEDICINE

## 2018-02-02 PROCEDURE — 85384 FIBRINOGEN ACTIVITY: CPT | Performed by: INTERNAL MEDICINE

## 2018-02-02 PROCEDURE — 81001 URINALYSIS AUTO W/SCOPE: CPT | Performed by: INTERNAL MEDICINE

## 2018-02-02 PROCEDURE — G0403 EKG FOR INITIAL PREVENT EXAM: HCPCS | Performed by: INTERNAL MEDICINE

## 2018-02-02 PROCEDURE — G0103 PSA SCREENING: HCPCS | Performed by: INTERNAL MEDICINE

## 2018-02-02 NOTE — PATIENT INSTRUCTIONS
Medicare Wellness  Personal Prevention Plan of Service     Date of Office Visit:  2018  Encounter Provider:  Dariana Dalton MD  Place of Service:  [facility] INTERNAL MEDICINE  Patient Name: Branden Brady  :  1950    As part of the Medicare Wellness portion of your visit today, we are providing you with this personalized preventive plan of services (PPPS). This plan is based upon recommendations of the [place] Preventive Services Task Force (USPSTF) and the Advisory Committee on Immunization Practices (ACIP).    This lists the preventive care services that should be considered, and provides dates of when you are due. Items listed as completed are up-to-date and do not require any further intervention.    Health Maintenance   Topic Date Due   • HEPATITIS C SCREENING  2016   • MEDICARE ANNUAL WELLNESS  2016   • COLONOSCOPY  2016   • PNEUMOCOCCAL VACCINES (65+ LOW/MEDIUM RISK) (1 of 2 - PCV13) 2017   • INFLUENZA VACCINE  2017   • LIPID PANEL  2018   • DXA SCAN  2019   • TDAP/TD VACCINES (2 - Td) 2021   • ZOSTER VACCINE  Completed   Check with Dr. Elaina Burrell in regard to next colonoscopy.  Your last one was done 2015 and some polyps were removed    No orders of the defined types were placed in this encounter.      No Follow-up on file.

## 2018-02-02 NOTE — PROGRESS NOTES
QUICK REFERENCE INFORMATION:  The ABCs of the Annual Wellness Visit    [facility]    HEALTH RISK ASSESSMENT    1950    Recent Hospitalizations:  No hospitalization(s) within the last year..      Current Medical Providers:  Patient Care Team:  Dariana Dalton MD as PCP - General  Dariana Dalton MD as PCP - Family Medicine      Smoking Status:  History   Smoking Status   • Never Smoker   Smokeless Tobacco   • Never Used       Alcohol Consumption:  History   Alcohol Use   • Yes     Comment: Socially        Depression Screen:   PHQ-2/PHQ-9 Depression Screening 2/2/2018   Little interest or pleasure in doing things 0   Feeling down, depressed, or hopeless 0   Trouble falling or staying asleep, or sleeping too much 0   Feeling tired or having little energy 0   Poor appetite or overeating 0   Feeling bad about yourself - or that you are a failure or have let yourself or your family down 0   Trouble concentrating on things, such as reading the newspaper or watching television 0   Moving or speaking so slowly that other people could have noticed. Or the opposite - being so fidgety or restless that you have been moving around a lot more than usual 0   Thoughts that you would be better off dead, or of hurting yourself in some way 0   Total Score 0       Health Habits and Functional and Cognitive Screening:  Functional & Cognitive Status 2/2/2018   Do you have difficulty preparing food and eating? No   Do you have difficulty bathing yourself, getting dressed or grooming yourself? No   Do you have difficulty using the toilet? No   Do you have difficulty moving around from place to place? No   Do you have trouble with steps or getting out of a bed or a chair? No   In the past year have you fallen or experienced a near fall? No   Current Diet Well Balanced Diet   Dental Exam Up to date   Eye Exam Up to date   Exercise (times per week) 5 times per week   Do you need help using the phone?  No   Are you deaf or do  you have serious difficulty hearing?  No   Do you need help with transportation? No   Do you need help shopping? No   Do you need help preparing meals?  No   Do you need help with housework?  No   Do you need help with laundry? No   Do you need help taking your medications? No   Do you need help managing money? No   Have you felt unusual stress, anger or loneliness in the last month? No   Who do you live with? Spouse   If you need help, do you have trouble finding someone available to you? No   Have you been bothered in the last four weeks by sexual problems? No   Do you have difficulty concentrating, remembering or making decisions? No           Does the patient have evidence of cognitive impairment? No    Aspirin use counseling? Taking ASA appropriately as indicated      Recent Lab Results:  CMP:  Lab Results   Component Value Date    BUN 24 (H) 01/31/2017    CREATININE 1.04 01/31/2017    EGFRIFNONA 72 01/31/2017    BCR 23.1 01/31/2017     01/31/2017    K 4.6 01/31/2017    CO2 30.0 01/31/2017    CALCIUM 9.0 01/31/2017    ALBUMIN 4.02 01/31/2017    LABIL2 1.3 01/31/2017    BILITOT 0.8 01/31/2017    ALKPHOS 84 01/31/2017    AST 38 (H) 01/31/2017    ALT 42 01/31/2017     Lipid Panel:  Lab Results   Component Value Date    TRIG 73 01/31/2017     HbA1c:  Lab Results   Component Value Date    HGBA1C 6.0 01/31/2017       Visual Acuity:  No exam data present    Age-appropriate Screening Schedule:  Refer to the list below for future screening recommendations based on patient's age, sex and/or medical conditions. Orders for these recommended tests are listed in the plan section. The patient has been provided with a written plan.    Health Maintenance   Topic Date Due   • COLONOSCOPY  04/20/2016   • PNEUMOCOCCAL VACCINES (65+ LOW/MEDIUM RISK) (1 of 2 - PCV13) 05/23/2017   • INFLUENZA VACCINE  08/01/2017   • LIPID PANEL  01/31/2018   • DXA SCAN  02/17/2019   • TDAP/TD VACCINES (2 - Td) 02/01/2021   • ZOSTER VACCINE   Completed        Subjective   History of Present Illness    Branden Brady is a 67 y.o. male an established patient presenting for a Welcome to Medicare Visit.     The following portions of the patient's history were reviewed and updated as appropriate: allergies, current medications, past family history, past medical history, past social history, past surgical history and problem list.    Outpatient Medications Prior to Visit   Medication Sig Dispense Refill   • aspirin 81 MG EC tablet Take 81 mg by mouth daily.     • Calcium Citrate-Vitamin D (CALCIUM + D PO) Take  by mouth.     • Multiple Vitamins-Minerals (MULTIVITAMIN ADULT PO) Take  by mouth.       No facility-administered medications prior to visit.        Patient Active Problem List   Diagnosis   • Hyperlipidemia   • Chronic midline low back pain without sciatica   • DDD (degenerative disc disease), lumbar       Advance Care Planning:  has an advance directive - a copy HAS NOT been provided. Have asked the patient to send this to us to add to record.    Identification of Risk Factors:  Risk factors include: no current risk factors.    Review of Systems   Constitutional: Negative for appetite change, chills, fatigue and fever.   HENT: Negative for congestion, ear pain, sinus pressure, sneezing, sore throat, tinnitus, trouble swallowing and voice change.    Eyes: Negative for visual disturbance.   Respiratory: Negative for cough and shortness of breath.    Cardiovascular: Negative for chest pain, palpitations and leg swelling.   Gastrointestinal: Negative for abdominal pain, constipation, diarrhea, nausea and vomiting.   Endocrine: Negative for cold intolerance, heat intolerance, polydipsia and polyuria.   Genitourinary: Negative for dysuria and frequency.   Musculoskeletal: Negative for arthralgias, back pain and gait problem.   Skin: Negative for rash.   Neurological: Negative for dizziness, syncope and headaches.   Hematological: Negative for adenopathy.  Does not bruise/bleed easily.   Psychiatric/Behavioral: Negative for decreased concentration, dysphoric mood and sleep disturbance. The patient is not nervous/anxious.        Compared to one year ago, the patient feels his physical health is the same.  Compared to one year ago, the patient feels his mental health is the same.    Objective    Physical Exam   Constitutional: He is oriented to person, place, and time. He appears well-developed and well-nourished. No distress.   HENT:   Right Ear: Hearing, tympanic membrane, external ear and ear canal normal.   Left Ear: Hearing, tympanic membrane, external ear and ear canal normal.   Nose: Right sinus exhibits no maxillary sinus tenderness and no frontal sinus tenderness. Left sinus exhibits no maxillary sinus tenderness and no frontal sinus tenderness.   Eyes: Conjunctivae, EOM and lids are normal. Pupils are equal, round, and reactive to light.   Neck: Trachea normal and phonation normal. Neck supple. Normal carotid pulses and no JVD present. Carotid bruit is not present. No thyroid mass and no thyromegaly present.   Cardiovascular: Normal rate, regular rhythm, S1 normal and S2 normal.  PMI is not displaced.  Exam reveals no gallop and no friction rub.    No murmur heard.  Pulses:       Carotid pulses are 2+ on the right side, and 2+ on the left side.       Radial pulses are 2+ on the right side, and 2+ on the left side.        Dorsalis pedis pulses are 2+ on the right side, and 2+ on the left side.   Pulmonary/Chest: Effort normal and breath sounds normal. He has no wheezes. He has no rhonchi. He has no rales. Chest wall is not dull to percussion.   Abdominal: Soft. Normal appearance, normal aorta and bowel sounds are normal. He exhibits no abdominal bruit and no mass. There is no hepatosplenomegaly. There is no tenderness.   Genitourinary: Rectum normal. Rectal exam shows guaiac negative stool. Prostate is enlarged.   Musculoskeletal: Normal range of motion. He  "exhibits no edema or tenderness.   Lymphadenopathy:     He has no cervical adenopathy.        Right: No supraclavicular adenopathy present.        Left: No supraclavicular adenopathy present.   Neurological: He is alert and oriented to person, place, and time. He has normal strength and normal reflexes. No cranial nerve deficit or sensory deficit. Coordination normal.   Skin: Skin is warm and dry. No rash noted.   Psychiatric: He has a normal mood and affect. His speech is normal and behavior is normal. Judgment and thought content normal. Cognition and memory are normal. He is attentive.   Nursing note and vitals reviewed.      Vitals:    02/02/18 1001   BP: 114/72   Weight: 95.3 kg (210 lb)   Height: 182.9 cm (72\")       Body mass index is 28.48 kg/(m^2).  Discussed the patient's BMI with him. BMI is above normal parameters. Follow-up plan includes:  exercise counseling and nutrition counseling.    Procedure     ECG 12 Lead  Date/Time: 2/2/2018 10:30 AM  Performed by: EDWARD GARCIA  Authorized by: CRISTIN NARANJO   Comparison: compared with previous ECG from 1/31/2017  Similar to previous ECG  Rhythm comments: irregular sinus bradycardia  Rate: bradycardic  BPM: 57  ST Segments: ST segments normal  T Waves: T waves normal  QRS axis: normal  Clinical impression: non-specific ECG               Assessment/Plan   Patient Self-Management and Personalized Health Advice  The patient has been provided with information about: diet and exercise and preventive services including:   · Screening for AAA, referral for ultrasound placed, He states he has had both pneumonia vaccines.    Visit Diagnoses:  No diagnosis found.    No orders of the defined types were placed in this encounter.      Outpatient Encounter Prescriptions as of 2/2/2018   Medication Sig Dispense Refill   • aspirin 81 MG EC tablet Take 81 mg by mouth daily.     • Calcium Citrate-Vitamin D (CALCIUM + D PO) Take  by mouth.     • Multiple Vitamins-Minerals " (MULTIVITAMIN ADULT PO) Take  by mouth.       No facility-administered encounter medications on file as of 2/2/2018.        Reviewed use of high risk medication in the elderly: not applicable  Reviewed for potential of harmful drug interactions in the elderly: not applicable    Follow Up:  No Follow-up on file.     An After Visit Summary and PPPS with all of these plans were given to the patient.

## 2018-02-03 LAB — ESTRADIOL SERPL HS-MCNC: 31.7 PG/ML (ref 7.6–42.6)

## 2018-02-04 LAB
TESTOST FREE SERPL-MCNC: 8.8 PG/ML (ref 6.6–18.1)
TESTOST SERPL-MCNC: 813 NG/DL (ref 264–916)

## 2018-02-06 LAB — DHEA SERPL-MCNC: 84 NG/DL (ref 31–701)

## 2018-03-15 ENCOUNTER — TELEPHONE (OUTPATIENT)
Dept: INTERNAL MEDICINE | Facility: CLINIC | Age: 68
End: 2018-03-15

## 2018-03-15 NOTE — TELEPHONE ENCOUNTER
----- Message from Kika Wallace sent at 3/15/2018  1:43 PM EDT -----  Contact: Patient  Patient called stating he had congestion and the  Always calls in  doxycycline 100 mg for him. Please advise    Patient: [phone]    Pharmacy:[facility] [id number] [place], [place] [address]  AT [place] [address] - [phone]  - [phone] FX

## 2018-03-16 ENCOUNTER — TELEPHONE (OUTPATIENT)
Dept: INTERNAL MEDICINE | Facility: CLINIC | Age: 68
End: 2018-03-16

## 2018-03-16 DIAGNOSIS — J06.9 URI, ACUTE: Primary | ICD-10-CM

## 2018-03-16 RX ORDER — DOXYCYCLINE HYCLATE 100 MG/1
100 TABLET, DELAYED RELEASE ORAL 2 TIMES DAILY
Qty: 14 TABLET | Refills: 0 | Status: SHIPPED | OUTPATIENT
Start: 2018-03-16 | End: 2018-03-16 | Stop reason: SDUPTHER

## 2018-03-16 RX ORDER — DOXYCYCLINE HYCLATE 100 MG
100 TABLET ORAL 2 TIMES DAILY
Qty: 14 TABLET | Refills: 0 | Status: SHIPPED | OUTPATIENT
Start: 2018-03-16 | End: 2018-10-16

## 2018-03-16 RX ORDER — DOXYCYCLINE HYCLATE 100 MG/1
100 TABLET, DELAYED RELEASE ORAL 2 TIMES DAILY
Qty: 14 TABLET | Refills: 0 | Status: SHIPPED | OUTPATIENT
Start: 2018-03-16 | End: 2018-10-16

## 2018-03-16 NOTE — TELEPHONE ENCOUNTER
----- Message from Kika Wallace sent at 3/16/2018  3:01 PM EDT -----  Contact: Patient  Patient  Would like the regular doxycycline 100 mg. Please advise    Patient:[phone]    Pharmacy:[facility] [address] [place] [address] AT [facility] - [phone]  - [phone] FX

## 2018-05-10 ENCOUNTER — TELEPHONE (OUTPATIENT)
Dept: NEUROSURGERY | Facility: CLINIC | Age: 68
End: 2018-05-10

## 2018-05-10 NOTE — TELEPHONE ENCOUNTER
Patient called back to the office as he called a few hours ago and said he had not heard anything back. I explained Dr. Roland is currently seeing patient's and he will typically answer his messages at the end of the day. He said well I am in severe pain and I need something for this pain. I explained we would more than likely not be able to give him anything for pain as it has been almost a year and when we seen him then it was for his back pain not his neck therefore he will need to be seen in the office before we can do order anything. I also told him it is typically our office policy to not prescribe any pain medications unless a patient has had surgery. I scheduled him next week with Abimbola on 5/17/2018. He still requested a message be sent to Dr. Roland, he then states that Thursday's appointment is not good enough and that he will just go to the gym and ask Dr. Roland for something there. I told him that was not really appropriate. I advised him if he is in such severe pain he should contact his PCP or go to an urgent care or ER to be evaluated. He said he does not have a PCP and he already knows he can do the others but will not. He again requested a message be sent to Dr. Roland and then hung up.

## 2018-05-10 NOTE — TELEPHONE ENCOUNTER
Pt called this am c/o neck pain that started 48 hours ago that started on the left side then last night moved to the right. He denies any numbness and tingling or bladder or bowel issues.     He says that he has been using a heating pad and taking ibuprofen 200 mg every 6 hours.

## 2018-05-10 NOTE — TELEPHONE ENCOUNTER
Phone in a MDP tomorrow. Visit with Abimbola is fine. Tell him there may be a pinched nerve in the neck. If he can't wait until 5/17/18, then he should go to ER.

## 2018-05-11 RX ORDER — METHYLPREDNISOLONE 4 MG/1
TABLET ORAL
Qty: 21 TABLET | Refills: 0 | Status: SHIPPED | OUTPATIENT
Start: 2018-05-11 | End: 2018-10-16

## 2018-05-11 RX ORDER — METHYLPREDNISOLONE 4 MG/1
TABLET ORAL
Qty: 21 TABLET | Refills: 0 | Status: SHIPPED | OUTPATIENT
Start: 2018-05-11 | End: 2018-05-11 | Stop reason: SDUPTHER

## 2018-05-11 NOTE — TELEPHONE ENCOUNTER
Patient called back and states the [facility] we sent his RX to their computers are down and unsure what time they will be back up. He requested we re-send the RX to [facility] in [place]. RX was sent to new pharmacy.

## 2018-09-06 ENCOUNTER — TELEPHONE (OUTPATIENT)
Dept: INTERNAL MEDICINE | Facility: CLINIC | Age: 68
End: 2018-09-06

## 2018-09-06 DIAGNOSIS — R79.89 ELEVATED TSH: Primary | ICD-10-CM

## 2018-09-06 DIAGNOSIS — E78.5 HYPERLIPIDEMIA, UNSPECIFIED HYPERLIPIDEMIA TYPE: ICD-10-CM

## 2018-09-06 NOTE — TELEPHONE ENCOUNTER
----- Message from Sandra Torres sent at 9/6/2018 11:25 AM EDT -----  Contact: pt  Pt was asked to come back in 6 months for TSH, would like to know if he can get  CMP  CBC   LIPIDS     Pt would like a call to make sure these get ordred.  Pt# [phone]

## 2018-09-13 ENCOUNTER — LAB (OUTPATIENT)
Dept: INTERNAL MEDICINE | Facility: CLINIC | Age: 68
End: 2018-09-13

## 2018-09-13 DIAGNOSIS — E78.5 HYPERLIPIDEMIA, UNSPECIFIED HYPERLIPIDEMIA TYPE: ICD-10-CM

## 2018-09-13 DIAGNOSIS — R79.89 ELEVATED TSH: ICD-10-CM

## 2018-09-13 LAB
ALBUMIN SERPL-MCNC: 3.9 G/DL (ref 3.5–5.2)
ALBUMIN/GLOB SERPL: 1.3 G/DL
ALP SERPL-CCNC: 56 U/L (ref 39–117)
ALT SERPL W P-5'-P-CCNC: 26 U/L (ref 1–41)
ANION GAP SERPL CALCULATED.3IONS-SCNC: 8.9 MMOL/L
AST SERPL-CCNC: 34 U/L (ref 1–40)
BASOPHILS # BLD AUTO: 0.04 10*3/MM3 (ref 0–0.2)
BASOPHILS NFR BLD AUTO: 0.6 % (ref 0–2)
BILIRUB SERPL-MCNC: 0.7 MG/DL (ref 0.1–1.2)
BUN BLD-MCNC: 20 MG/DL (ref 8–23)
BUN/CREAT SERPL: 18.2 (ref 7–25)
CALCIUM SPEC-SCNC: 9.3 MG/DL (ref 8.6–10.5)
CHLORIDE SERPL-SCNC: 105 MMOL/L (ref 98–107)
CHOLEST SERPL-MCNC: 216 MG/DL (ref 0–200)
CO2 SERPL-SCNC: 27.1 MMOL/L (ref 22–29)
CREAT BLD-MCNC: 1.1 MG/DL (ref 0.76–1.27)
DEPRECATED RDW RBC AUTO: 45.4 FL (ref 37–54)
EOSINOPHIL # BLD AUTO: 0.22 10*3/MM3 (ref 0–0.7)
EOSINOPHIL NFR BLD AUTO: 3.3 % (ref 0–5)
ERYTHROCYTE [DISTWIDTH] IN BLOOD BY AUTOMATED COUNT: 13.5 % (ref 11.5–15)
GFR SERPL CREATININE-BSD FRML MDRD: 67 ML/MIN/1.73
GLOBULIN UR ELPH-MCNC: 3 GM/DL
GLUCOSE BLD-MCNC: 103 MG/DL (ref 65–99)
HCT VFR BLD AUTO: 44.6 % (ref 40.1–51)
HDLC SERPL-MCNC: 75 MG/DL (ref 40–60)
HGB BLD-MCNC: 15.2 G/DL (ref 13.7–17.5)
LDLC SERPL CALC-MCNC: 132 MG/DL (ref 0–100)
LDLC/HDLC SERPL: 1.76 {RATIO}
LYMPHOCYTES # BLD AUTO: 2.46 10*3/MM3 (ref 0.8–7)
LYMPHOCYTES NFR BLD AUTO: 37.1 % (ref 10–60)
MCH RBC QN AUTO: 32.3 PG (ref 26–34)
MCHC RBC AUTO-ENTMCNC: 34.1 G/DL (ref 31–37)
MCV RBC AUTO: 94.9 FL (ref 80–100)
MONOCYTES # BLD AUTO: 0.67 10*3/MM3 (ref 0–1)
MONOCYTES NFR BLD AUTO: 10.1 % (ref 0–13)
NEUTROPHILS # BLD AUTO: 3.24 10*3/MM3 (ref 1–11)
NEUTROPHILS NFR BLD AUTO: 48.9 % (ref 30–85)
PLATELET # BLD AUTO: 193 10*3/MM3 (ref 150–450)
PMV BLD AUTO: 10.4 FL (ref 6–12)
POTASSIUM BLD-SCNC: 4.8 MMOL/L (ref 3.5–5.2)
PROT SERPL-MCNC: 6.9 G/DL (ref 6–8.5)
RBC # BLD AUTO: 4.7 10*6/MM3 (ref 4.63–6.08)
SODIUM BLD-SCNC: 141 MMOL/L (ref 136–145)
T4 FREE SERPL-MCNC: 1.01 NG/DL (ref 0.93–1.7)
TRIGL SERPL-MCNC: 44 MG/DL (ref 0–150)
TSH SERPL DL<=0.05 MIU/L-ACNC: 4.79 MIU/ML (ref 0.27–4.2)
VLDLC SERPL-MCNC: 8.8 MG/DL (ref 5–40)
WBC NRBC COR # BLD: 6.63 10*3/MM3 (ref 5–10)

## 2018-09-13 PROCEDURE — 85025 COMPLETE CBC W/AUTO DIFF WBC: CPT | Performed by: INTERNAL MEDICINE

## 2018-09-13 PROCEDURE — 84439 ASSAY OF FREE THYROXINE: CPT | Performed by: INTERNAL MEDICINE

## 2018-09-13 PROCEDURE — 80061 LIPID PANEL: CPT | Performed by: INTERNAL MEDICINE

## 2018-09-13 PROCEDURE — 36415 COLL VENOUS BLD VENIPUNCTURE: CPT | Performed by: INTERNAL MEDICINE

## 2018-09-13 PROCEDURE — 80053 COMPREHEN METABOLIC PANEL: CPT | Performed by: INTERNAL MEDICINE

## 2018-09-13 PROCEDURE — 84443 ASSAY THYROID STIM HORMONE: CPT | Performed by: INTERNAL MEDICINE

## 2018-10-16 ENCOUNTER — OFFICE VISIT (OUTPATIENT)
Dept: INTERNAL MEDICINE | Facility: CLINIC | Age: 68
End: 2018-10-16

## 2018-10-16 VITALS
HEIGHT: 71 IN | RESPIRATION RATE: 16 BRPM | OXYGEN SATURATION: 99 % | WEIGHT: 207.8 LBS | HEART RATE: 70 BPM | BODY MASS INDEX: 29.09 KG/M2 | SYSTOLIC BLOOD PRESSURE: 122 MMHG | DIASTOLIC BLOOD PRESSURE: 82 MMHG

## 2018-10-16 DIAGNOSIS — R07.81 RIB PAIN ON LEFT SIDE: Primary | ICD-10-CM

## 2018-10-16 PROCEDURE — 99212 OFFICE O/P EST SF 10 MIN: CPT | Performed by: INTERNAL MEDICINE

## 2018-10-16 NOTE — PROGRESS NOTES
"Chief Complaint   Patient presents with   • Rib Pain     Left side rib pain after a fall from jogging last Saturday.       Subjective   Branden Brady is a 68 y.o. male.     History of Present Illness       On Saturday, 4 days ago, he fell while jogging.  He fell on the sidewalk.  He fell on his left side and has had discomfort over his left chest wall since then.  He has not had trouble breathing.  He did not noticed any visible bruising.  He was able to get up, walk for short distance and then continue jogging.  The discomfort persists.  He finds it uncomfortable to lie on his left side at night.      The following portions of the patient's history were reviewed and updated as appropriate: allergies, current medications, past family history, past medical history, past social history, past surgical history and problem list.    Review of Systems   Respiratory: Negative for cough and shortness of breath.    Gastrointestinal: Negative for abdominal distention, abdominal pain, nausea and vomiting.         Current Outpatient Prescriptions:   •  aspirin 81 MG EC tablet, Take 81 mg by mouth daily., Disp: , Rfl:   •  Calcium Citrate-Vitamin D (CALCIUM + D PO), Take  by mouth., Disp: , Rfl:   •  Multiple Vitamins-Minerals (MULTIVITAMIN ADULT PO), Take  by mouth., Disp: , Rfl:         Objective     /82 (BP Location: Left arm, Patient Position: Sitting, Cuff Size: Adult)   Pulse 70   Resp 16   Ht 179.5 cm (70.65\")   Wt 94.3 kg (207 lb 12.8 oz)   SpO2 99%   BMI 29.27 kg/m²     Physical Exam   Constitutional: He appears well-developed and well-nourished. No distress.   Cardiovascular: Normal rate, regular rhythm and normal heart sounds.  Exam reveals no gallop and no friction rub.    No murmur heard.  Pulmonary/Chest: Effort normal and breath sounds normal. No respiratory distress. He has no wheezes. He has no rhonchi. He has no rales. Chest wall is not dull to percussion. He exhibits bony tenderness (over left " lateral lower rib cage).   Nursing note and vitals reviewed.        Assessment/Plan   Branden was seen today for rib pain.    Diagnoses and all orders for this visit:    Rib pain on left side      Discussed.  His left lower rib cage is tender to palpation laterally.  There is no bony crepitus.  He may have bruised his ribs.  There is no external bruising visible.  Advised him this might take another week or 2 to completely resolve.  It is okay for him to continue his usual activities.  He may use over-the-counter analgesic products such as lidocaine patch, Aspercreme.

## 2018-10-25 ENCOUNTER — TELEPHONE (OUTPATIENT)
Dept: INTERNAL MEDICINE | Facility: CLINIC | Age: 68
End: 2018-10-25

## 2018-10-25 DIAGNOSIS — R07.81 RIB PAIN ON LEFT SIDE: Primary | ICD-10-CM

## 2018-10-25 NOTE — TELEPHONE ENCOUNTER
----- Message from Kika Wallace sent at 10/25/2018 10:34 AM EDT -----  Contact: Patient  Patient calling would like to get an x-ray of his left side he was in to see Dr. Dalton about this on 10/16 and the pain is getting worse. Please advise    Patient:[phone]

## 2018-10-26 ENCOUNTER — HOSPITAL ENCOUNTER (OUTPATIENT)
Dept: GENERAL RADIOLOGY | Facility: HOSPITAL | Age: 68
Discharge: HOME | End: 2018-10-26
Attending: INTERNAL MEDICINE | Admitting: INTERNAL MEDICINE

## 2018-10-26 ENCOUNTER — TELEPHONE (OUTPATIENT)
Dept: INTERNAL MEDICINE | Facility: CLINIC | Age: 68
End: 2018-10-26

## 2018-10-26 ENCOUNTER — HOSPITAL ENCOUNTER (OUTPATIENT)
Dept: GENERAL RADIOLOGY | Facility: HOSPITAL | Age: 68
Discharge: HOME | End: 2018-10-26
Attending: INTERNAL MEDICINE

## 2018-10-26 DIAGNOSIS — R07.81 RIB PAIN ON LEFT SIDE: ICD-10-CM

## 2018-10-26 DIAGNOSIS — R07.81 RIB PAIN ON LEFT SIDE: Primary | ICD-10-CM

## 2018-10-26 PROCEDURE — 71100 X-RAY EXAM RIBS UNI 2 VIEWS: CPT

## 2018-10-26 PROCEDURE — 71046 X-RAY EXAM CHEST 2 VIEWS: CPT

## 2018-10-26 NOTE — TELEPHONE ENCOUNTER
I recommend against pushing wheelchairs.  I think just walking for exercise would be the best thing right now.

## 2018-10-26 NOTE — TELEPHONE ENCOUNTER
----- Message from Sarah Burnett sent at 10/26/2018 12:11 PM EDT -----  Contact: Patient   Patient calling requesting imaging results when we get them.  Chest and ribs done today.  Thanks.    Patient:  [phone]

## 2018-10-26 NOTE — TELEPHONE ENCOUNTER
Pt informed.  Asked what type of exercise you think would be okay for him to do as he is healing.  He said he went for a run and it exacerbated the pain.  He just does not want to make it worse.  He is volunteering Tuesday at [facility] at will be pushing wheelchairs.  Is that okay?  Please advise on exercise.  Thank you.

## 2018-11-20 ENCOUNTER — TELEPHONE (OUTPATIENT)
Dept: INTERNAL MEDICINE | Facility: CLINIC | Age: 68
End: 2018-11-20

## 2018-11-20 DIAGNOSIS — J06.9 URI, ACUTE: ICD-10-CM

## 2018-11-20 RX ORDER — DOXYCYCLINE HYCLATE 100 MG/1
100 TABLET, DELAYED RELEASE ORAL 2 TIMES DAILY
Qty: 14 TABLET | Refills: 0 | Status: SHIPPED | OUTPATIENT
Start: 2018-11-20 | End: 2019-02-05

## 2018-11-20 NOTE — TELEPHONE ENCOUNTER
----- Message from Venecia Bernardo sent at 11/20/2018  9:39 AM EST -----  Pt calling requesting a Rx of Doxycycline Hyclate 100 mg. Twice a day for 10 days.  Pt says he gets this about once a year.  He has non productive dry cough, sore throat, nasal drainage for 7 days.  Has not taken temperature.  Has taken tylenol, guaifenesin     He would like Rx sent to [facility] [phone]    Pt# [phone]

## 2019-02-05 ENCOUNTER — OFFICE VISIT (OUTPATIENT)
Dept: INTERNAL MEDICINE | Facility: CLINIC | Age: 69
End: 2019-02-05

## 2019-02-05 VITALS
HEIGHT: 71 IN | BODY MASS INDEX: 29.26 KG/M2 | SYSTOLIC BLOOD PRESSURE: 130 MMHG | DIASTOLIC BLOOD PRESSURE: 80 MMHG | WEIGHT: 209 LBS

## 2019-02-05 DIAGNOSIS — M54.9 BACK PAIN, UNSPECIFIED BACK LOCATION, UNSPECIFIED BACK PAIN LATERALITY, UNSPECIFIED CHRONICITY: ICD-10-CM

## 2019-02-05 DIAGNOSIS — R73.01 ELEVATED FASTING GLUCOSE: ICD-10-CM

## 2019-02-05 DIAGNOSIS — Z00.00 MEDICARE ANNUAL WELLNESS VISIT, SUBSEQUENT: Primary | ICD-10-CM

## 2019-02-05 DIAGNOSIS — Z11.59 SCREENING FOR VIRAL DISEASE: ICD-10-CM

## 2019-02-05 DIAGNOSIS — R79.89 ELEVATED TSH: ICD-10-CM

## 2019-02-05 DIAGNOSIS — Z12.11 ENCOUNTER FOR HEMOCCULT SCREENING: ICD-10-CM

## 2019-02-05 DIAGNOSIS — Z12.5 PROSTATE CANCER SCREENING: ICD-10-CM

## 2019-02-05 DIAGNOSIS — E78.5 HYPERLIPIDEMIA, UNSPECIFIED HYPERLIPIDEMIA TYPE: ICD-10-CM

## 2019-02-05 DIAGNOSIS — M85.88 OSTEOPENIA OF LUMBAR SPINE: ICD-10-CM

## 2019-02-05 LAB
25(OH)D3 SERPL-MCNC: 66.1 NG/ML (ref 30–100)
ALBUMIN SERPL-MCNC: 4.6 G/DL (ref 3.5–5.2)
ALBUMIN/GLOB SERPL: 1.5 G/DL
ALP SERPL-CCNC: 101 U/L (ref 39–117)
ALT SERPL W P-5'-P-CCNC: 28 U/L (ref 1–41)
ANION GAP SERPL CALCULATED.3IONS-SCNC: 12.4 MMOL/L
AST SERPL-CCNC: 35 U/L (ref 1–40)
BACTERIA UR QL AUTO: ABNORMAL /HPF
BASOPHILS # BLD AUTO: 0.03 10*3/MM3 (ref 0–0.2)
BASOPHILS NFR BLD AUTO: 0.4 % (ref 0–2)
BILIRUB SERPL-MCNC: 0.7 MG/DL (ref 0.1–1.2)
BILIRUB UR QL STRIP: NEGATIVE
BUN BLD-MCNC: 21 MG/DL (ref 8–23)
BUN/CREAT SERPL: 20.6 (ref 7–25)
CALCIUM SPEC-SCNC: 9.4 MG/DL (ref 8.6–10.5)
CHLORIDE SERPL-SCNC: 102 MMOL/L (ref 98–107)
CHOLEST SERPL-MCNC: 254 MG/DL (ref 0–200)
CLARITY UR: CLEAR
CO2 SERPL-SCNC: 28.6 MMOL/L (ref 22–29)
COLOR UR: YELLOW
CREAT BLD-MCNC: 1.02 MG/DL (ref 0.76–1.27)
CRP SERPL-MCNC: 0.13 MG/DL (ref 0.01–0.5)
DEPRECATED RDW RBC AUTO: 45.6 FL (ref 37–54)
EOSINOPHIL # BLD AUTO: 0.17 10*3/MM3 (ref 0–0.7)
EOSINOPHIL NFR BLD AUTO: 2.3 % (ref 0–5)
ERYTHROCYTE [DISTWIDTH] IN BLOOD BY AUTOMATED COUNT: 13.6 % (ref 11.5–15)
ERYTHROCYTE [SEDIMENTATION RATE] IN BLOOD: 3 MM/HR (ref 0–20)
FIBRINOGEN PPP-MCNC: 394 MG/DL (ref 219–464)
GFR SERPL CREATININE-BSD FRML MDRD: 73 ML/MIN/1.73
GLOBULIN UR ELPH-MCNC: 3 GM/DL
GLUCOSE BLD-MCNC: 72 MG/DL (ref 65–99)
GLUCOSE UR STRIP-MCNC: NEGATIVE MG/DL
HBA1C MFR BLD: 5.49 % (ref 4.8–5.6)
HCT VFR BLD AUTO: 48.2 % (ref 40.1–51)
HCV AB SER DONR QL: NORMAL
HDLC SERPL-MCNC: 85 MG/DL (ref 40–60)
HEMOCCULT STL QL IA: NEGATIVE
HGB BLD-MCNC: 16.3 G/DL (ref 13.7–17.5)
HGB UR QL STRIP.AUTO: ABNORMAL
HYALINE CASTS UR QL AUTO: ABNORMAL /LPF
KETONES UR QL STRIP: NEGATIVE
LDLC SERPL CALC-MCNC: 159 MG/DL (ref 0–100)
LDLC/HDLC SERPL: 1.87 {RATIO}
LEUKOCYTE ESTERASE UR QL STRIP.AUTO: NEGATIVE
LYMPHOCYTES # BLD AUTO: 2.26 10*3/MM3 (ref 0.8–7)
LYMPHOCYTES NFR BLD AUTO: 30.3 % (ref 10–60)
MCH RBC QN AUTO: 31.8 PG (ref 26–34)
MCHC RBC AUTO-ENTMCNC: 33.8 G/DL (ref 31–37)
MCV RBC AUTO: 94.1 FL (ref 80–100)
MONOCYTES # BLD AUTO: 0.8 10*3/MM3 (ref 0–1)
MONOCYTES NFR BLD AUTO: 10.7 % (ref 0–13)
MUCOUS THREADS URNS QL MICRO: ABNORMAL /HPF
NEUTROPHILS # BLD AUTO: 4.21 10*3/MM3 (ref 1–11)
NEUTROPHILS NFR BLD AUTO: 56.3 % (ref 30–85)
NITRITE UR QL STRIP: NEGATIVE
PH UR STRIP.AUTO: 5.5 [PH] (ref 5–8)
PLATELET # BLD AUTO: 246 10*3/MM3 (ref 150–450)
PMV BLD AUTO: 10.8 FL (ref 6–12)
POTASSIUM BLD-SCNC: 4.6 MMOL/L (ref 3.5–5.2)
PROT SERPL-MCNC: 7.6 G/DL (ref 6–8.5)
PROT UR QL STRIP: NEGATIVE
PSA SERPL-MCNC: 2.05 NG/ML (ref 0–4)
RBC # BLD AUTO: 5.12 10*6/MM3 (ref 4.63–6.08)
RBC # UR: ABNORMAL /HPF
REF LAB TEST METHOD: ABNORMAL
SODIUM BLD-SCNC: 143 MMOL/L (ref 136–145)
SP GR UR STRIP: 1.01 (ref 1–1.03)
SQUAMOUS #/AREA URNS HPF: ABNORMAL /HPF
TRIGL SERPL-MCNC: 50 MG/DL (ref 0–150)
TSH SERPL DL<=0.05 MIU/L-ACNC: 3.36 MIU/ML (ref 0.27–4.2)
UROBILINOGEN UR QL STRIP: ABNORMAL
VLDLC SERPL-MCNC: 10 MG/DL (ref 5–40)
WBC NRBC COR # BLD: 7.47 10*3/MM3 (ref 5–10)
WBC UR QL AUTO: ABNORMAL /HPF

## 2019-02-05 PROCEDURE — 86803 HEPATITIS C AB TEST: CPT | Performed by: INTERNAL MEDICINE

## 2019-02-05 PROCEDURE — 82274 ASSAY TEST FOR BLOOD FECAL: CPT | Performed by: INTERNAL MEDICINE

## 2019-02-05 PROCEDURE — 80061 LIPID PANEL: CPT | Performed by: INTERNAL MEDICINE

## 2019-02-05 PROCEDURE — 85025 COMPLETE CBC W/AUTO DIFF WBC: CPT | Performed by: INTERNAL MEDICINE

## 2019-02-05 PROCEDURE — 99214 OFFICE O/P EST MOD 30 MIN: CPT | Performed by: INTERNAL MEDICINE

## 2019-02-05 PROCEDURE — 80053 COMPREHEN METABOLIC PANEL: CPT | Performed by: INTERNAL MEDICINE

## 2019-02-05 PROCEDURE — 36415 COLL VENOUS BLD VENIPUNCTURE: CPT | Performed by: INTERNAL MEDICINE

## 2019-02-05 PROCEDURE — 86141 C-REACTIVE PROTEIN HS: CPT | Performed by: INTERNAL MEDICINE

## 2019-02-05 PROCEDURE — G0103 PSA SCREENING: HCPCS | Performed by: INTERNAL MEDICINE

## 2019-02-05 PROCEDURE — 82306 VITAMIN D 25 HYDROXY: CPT | Performed by: INTERNAL MEDICINE

## 2019-02-05 PROCEDURE — 81001 URINALYSIS AUTO W/SCOPE: CPT | Performed by: INTERNAL MEDICINE

## 2019-02-05 PROCEDURE — 84443 ASSAY THYROID STIM HORMONE: CPT | Performed by: INTERNAL MEDICINE

## 2019-02-05 PROCEDURE — 83036 HEMOGLOBIN GLYCOSYLATED A1C: CPT | Performed by: INTERNAL MEDICINE

## 2019-02-05 PROCEDURE — 85652 RBC SED RATE AUTOMATED: CPT | Performed by: INTERNAL MEDICINE

## 2019-02-05 PROCEDURE — 85384 FIBRINOGEN ACTIVITY: CPT | Performed by: INTERNAL MEDICINE

## 2019-02-05 PROCEDURE — G0439 PPPS, SUBSEQ VISIT: HCPCS | Performed by: INTERNAL MEDICINE

## 2019-02-05 RX ORDER — ASPIRIN 81 MG/1
81 TABLET ORAL DAILY
COMMUNITY
End: 2020-02-12

## 2019-02-05 NOTE — PROGRESS NOTES
QUICK REFERENCE INFORMATION:  The ABCs of the Annual Wellness Visit    Subsequent Medicare Wellness Visit    HEALTH RISK ASSESSMENT    1950    Recent Hospitalizations:  No hospitalization(s) within the last year..        Current Medical Providers:  Patient Care Team:  Dariana Dalton MD as PCP - General  Dariana Dalton MD as PCP - Family Medicine  Dariana Dalton MD as PCP - Claims Attributed        Smoking Status:  Social History     Tobacco Use   Smoking Status Never Smoker   Smokeless Tobacco Never Used       Alcohol Consumption:  Social History     Substance and Sexual Activity   Alcohol Use Yes    Comment: Socially        Depression Screen:   PHQ-2/PHQ-9 Depression Screening 2/5/2019   Little interest or pleasure in doing things 0   Feeling down, depressed, or hopeless 0   Trouble falling or staying asleep, or sleeping too much 0   Feeling tired or having little energy 0   Poor appetite or overeating 0   Feeling bad about yourself - or that you are a failure or have let yourself or your family down 0   Trouble concentrating on things, such as reading the newspaper or watching television 0   Moving or speaking so slowly that other people could have noticed. Or the opposite - being so fidgety or restless that you have been moving around a lot more than usual 0   Thoughts that you would be better off dead, or of hurting yourself in some way 0   Total Score 0       Health Habits and Functional and Cognitive Screening:  Functional & Cognitive Status 2/5/2019   Do you have difficulty preparing food and eating? No   Do you have difficulty bathing yourself, getting dressed or grooming yourself? No   Do you have difficulty using the toilet? No   Do you have difficulty moving around from place to place? No   Do you have trouble with steps or getting out of a bed or a chair? No   In the past year have you fallen or experienced a near fall? Yes   Current Diet Well Balanced Diet   Dental Exam Up to date   Eye Exam Up to  date   Exercise (times per week) 4 times per week   Current Exercise Activities Include Running/Jogging   Do you need help using the phone?  No   Are you deaf or do you have serious difficulty hearing?  No   Do you need help with transportation? No   Do you need help shopping? No   Do you need help preparing meals?  No   Do you need help with housework?  No   Do you need help with laundry? No   Do you need help taking your medications? No   Do you need help managing money? No   Do you ever drive or ride in a car without wearing a seat belt? No   Have you felt unusual stress, anger or loneliness in the last month? No   Who do you live with? Spouse   If you need help, do you have trouble finding someone available to you? No   Have you been bothered in the last four weeks by sexual problems? No   Do you have difficulty concentrating, remembering or making decisions? No           Does the patient have evidence of cognitive impairment? No    Aspirin use counseling: Taking ASA appropriately as indicated      Recent Lab Results:  CMP:  Lab Results   Component Value Date    BUN 20 09/13/2018    CREATININE 1.10 09/13/2018    EGFRIFNONA 67 09/13/2018    BCR 18.2 09/13/2018     09/13/2018    K 4.8 09/13/2018    CO2 27.1 09/13/2018    CALCIUM 9.3 09/13/2018    ALBUMIN 3.90 09/13/2018    BILITOT 0.7 09/13/2018    ALKPHOS 56 09/13/2018    AST 34 09/13/2018    ALT 26 09/13/2018     Lipid Panel:  Lab Results   Component Value Date    CHOL 216 (H) 09/13/2018    TRIG 44 09/13/2018    HDL 75 (H) 09/13/2018    VLDL 8.8 09/13/2018    LDLHDL 1.76 09/13/2018     HbA1c:  Lab Results   Component Value Date    HGBA1C 6.0 01/31/2017       Visual Acuity:  No exam data present    Age-appropriate Screening Schedule:  Refer to the list below for future screening recommendations based on patient's age, sex and/or medical conditions. Orders for these recommended tests are listed in the plan section. The patient has been provided with a  "written plan.    Health Maintenance   Topic Date Due   • ZOSTER VACCINE (2 of 3) 02/29/2016   • COLONOSCOPY  03/07/2016   • DXA SCAN  02/17/2019   • LIPID PANEL  09/13/2019   • TDAP/TD VACCINES (2 - Td) 02/01/2021   • INFLUENZA VACCINE  Completed   • PNEUMOCOCCAL VACCINES (65+ LOW/MEDIUM RISK)  Completed        Subjective   History of Present Illness    Branden Brady is a 68 y.o. male who presents for an Subsequent Wellness Visit.    The following portions of the patient's history were reviewed and updated as appropriate: allergies, current medications, past family history, past medical history, past social history, past surgical history and problem list.    Outpatient Medications Prior to Visit   Medication Sig Dispense Refill   • aspirin 81 MG EC tablet Take 81 mg by mouth Daily.     • Calcium Citrate-Vitamin D (CALCIUM + D PO) Take  by mouth.     • Multiple Vitamins-Minerals (MULTIVITAMIN ADULT PO) Take  by mouth.     • doxycycline (DORYX) 100 MG enteric coated tablet Take 1 tablet by mouth 2 (Two) Times a Day. 14 tablet 0     No facility-administered medications prior to visit.        Patient Active Problem List   Diagnosis   • Hyperlipidemia   • Chronic midline low back pain without sciatica   • DDD (degenerative disc disease), lumbar   • Colon cancer screening       Advance Care Planning:  has NO advance directive - information provided to the patient today    Identification of Risk Factors:  Risk factors include: weight .    Review of Systems    Compared to one year ago, the patient feels his physical health is the same.  Compared to one year ago, the patient feels his mental health is the same.    Objective     Physical Exam    Vitals:    02/05/19 1111   BP: 130/80   BP Location: Left arm   Patient Position: Sitting   Cuff Size: Adult   Weight: 94.8 kg (209 lb)   Height: 179.1 cm (70.5\")       Patient's Body mass index is 29.56 kg/m². BMI is above normal parameters. Recommendations include: diet and " exercise.      Assessment/Plan   Patient Self-Management and Personalized Health Advice  The patient has been provided with information about: diet, exercise, weight management and designing advance directives and preventive services including:   · Bone densitometry screening.    Visit Diagnoses:    ICD-10-CM ICD-9-CM   1. Medicare annual wellness visit, subsequent Z00.00 V70.0   2. Screening for viral disease Z11.59 V73.99   3. Screening for osteoporosis Z13.820 V82.81   4. Hyperlipidemia, unspecified hyperlipidemia type E78.5 272.4   5. Elevated TSH R79.89 794.5       No orders of the defined types were placed in this encounter.      Outpatient Encounter Medications as of 2/5/2019   Medication Sig Dispense Refill   • aspirin 81 MG EC tablet Take 81 mg by mouth Daily.     • Calcium Citrate-Vitamin D (CALCIUM + D PO) Take  by mouth.     • Multiple Vitamins-Minerals (MULTIVITAMIN ADULT PO) Take  by mouth.     • [DISCONTINUED] doxycycline (DORYX) 100 MG enteric coated tablet Take 1 tablet by mouth 2 (Two) Times a Day. 14 tablet 0     No facility-administered encounter medications on file as of 2/5/2019.        Reviewed use of high risk medication in the elderly: not applicable  Reviewed for potential of harmful drug interactions in the elderly: not applicable    Follow Up:  No Follow-up on file.     An After Visit Summary and PPPS with all of these plans were given to the patient.

## 2019-02-05 NOTE — PROGRESS NOTES
Chief Complaint   Patient presents with   • Medicare Wellness-subsequent   • Hyperlipidemia       Subjective   Branden Brady is a 68 y.o. male.     He comes in for Medicare annual wellness exam, follow-up of hyperlipidemia.  He has other concerns as well.  He had a bone density study done about 2 years ago.  The T score in his lumbar spine was -1.5, low.  He recently fractured some ribs with the fall.  He is wondering if he has osteoporosis, wonders if his vitamin D level is low.  He is taking a calcium supplement.  Previous lab testing is shown an elevated TSH.  He has not noticed any change in the way he tolerates heat and cold.  His energy is generally good.  No change in hair or skin.  He recently did something that might of pulled his back.  He's been having some back discomfort.  It was really quite uncomfortable several weeks ago but seems to be getting better.       Hyperlipidemia   This is a chronic problem. Recent lipid tests were reviewed and are variable. He has no history of diabetes or obesity. There are no known factors aggravating his hyperlipidemia. Pertinent negatives include no chest pain, leg pain, myalgias or shortness of breath. Current antihyperlipidemic treatment includes exercise and diet change. The current treatment provides moderate improvement of lipids. Risk factors for coronary artery disease include dyslipidemia and male sex.      Elevated fasting glucose:  This is a chronic problem.  He denies polyuria, polydipsia, vision change appetite change, unexplained weight loss.   Lab Results   Component Value Date    HGBA1C 6.0 01/31/2017      The following portions of the patient's history were reviewed and updated as appropriate: allergies, current medications, past family history, past medical history, past social history, past surgical history and problem list.    Review of Systems   Constitutional: Negative for appetite change, chills, fatigue, fever, unexpected weight gain and  "unexpected weight loss.   HENT: Negative for trouble swallowing.    Eyes: Negative for blurred vision and double vision.   Respiratory: Negative for cough and shortness of breath.    Cardiovascular: Negative for chest pain, palpitations and leg swelling.   Gastrointestinal: Negative for abdominal pain, constipation, diarrhea, nausea and vomiting.   Endocrine: Negative for cold intolerance, heat intolerance, polydipsia and polyuria.   Genitourinary: Negative for dysuria and frequency.   Musculoskeletal: Positive for back pain. Negative for myalgias.   Neurological: Negative for dizziness and headache.   Hematological: Negative for adenopathy. Does not bruise/bleed easily.   Psychiatric/Behavioral: Negative for sleep disturbance and depressed mood. The patient is not nervous/anxious.          Current Outpatient Medications:   •  aspirin 81 MG EC tablet, Take 81 mg by mouth Daily., Disp: , Rfl:   •  Calcium Citrate-Vitamin D (CALCIUM + D PO), Take  by mouth., Disp: , Rfl:   •  Multiple Vitamins-Minerals (MULTIVITAMIN ADULT PO), Take  by mouth., Disp: , Rfl:         Objective     /80 (BP Location: Left arm, Patient Position: Sitting, Cuff Size: Adult)   Ht 179.1 cm (70.5\")   Wt 94.8 kg (209 lb)   BMI 29.56 kg/m²     Physical Exam   Constitutional: He is oriented to person, place, and time. He appears well-developed and well-nourished. No distress.   Neck: Normal carotid pulses present. Carotid bruit is not present.   Cardiovascular: Regular rhythm, S1 normal and S2 normal. Exam reveals no gallop and no friction rub.   No murmur heard.  Pulses:       Carotid pulses are 2+ on the right side, and 2+ on the left side.  Pulmonary/Chest: Effort normal and breath sounds normal. He has no wheezes. He has no rhonchi. He has no rales. Chest wall is not dull to percussion.   Abdominal: Soft. There is no hepatosplenomegaly. There is no tenderness. No hernia.   Genitourinary: Rectum normal and prostate normal. Rectal exam " shows guaiac negative stool.   Musculoskeletal: He exhibits no edema.        Lumbar back: He exhibits normal range of motion (some discomfort with extension, but no decrease in ROM) and no bony tenderness.   Neurological: He is alert and oriented to person, place, and time.   Reflex Scores:       Patellar reflexes are 2+ on the right side and 2+ on the left side.  Skin: Skin is warm and dry.   Nursing note and vitals reviewed.        Assessment/Plan   Branden was seen today for medicare wellness-subsequent and hyperlipidemia.    Diagnoses and all orders for this visit:    Medicare annual wellness visit, subsequent    Screening for viral disease  -     Hepatitis C Antibody; Future  -     Hepatitis C Antibody    Hyperlipidemia, unspecified hyperlipidemia type  -     Comprehensive Metabolic Panel; Future  -     CBC & Differential; Future  -     Urinalysis With Microscopic If Indicated (No Culture) - Urine, Clean Catch; Future  -     Lipid Panel; Future  -     TSH Rfx On Abnormal To Free T4; Future  -     High Sensitivity CRP; Future  -     Fibrinogen; Future  -     Homocysteine; Future  -     Comprehensive Metabolic Panel  -     CBC & Differential  -     Urinalysis With Microscopic If Indicated (No Culture) - Urine, Clean Catch  -     Lipid Panel  -     TSH Rfx On Abnormal To Free T4  -     High Sensitivity CRP  -     Fibrinogen  -     Homocysteine  -     CBC Auto Differential    Elevated TSH  -     DHEA; Future  -     Estradiol; Future  -     Testosterone (Free & Total), LC / MS; Future  -     DHEA  -     Estradiol  -     Testosterone (Free & Total), LC / MS    Encounter for Hemoccult screening  -     POC FECAL OCCULT BLOOD BY IMMUNOASSAY    Prostate cancer screening  -     PSA Screen; Future  -     PSA Screen    Back pain, unspecified back location, unspecified back pain laterality, unspecified chronicity  -     Sedimentation Rate; Future  -     Sedimentation Rate    Osteopenia of lumbar spine  -     Vitamin D 25  Hydroxy; Future  -     DEXA Bone Density Axial; Future  -     Vitamin D 25 Hydroxy    Elevated fasting glucose  -     Hemoglobin A1c; Future  -     Hemoglobin A1c    Encouraged him to continue prudent diet, regular exercise.  If he continues to have low back discomfort, we could try physical therapy.  His blood pressure initially was 150/90.  Discussed.  He will monitor blood pressure at home.

## 2019-02-05 NOTE — PATIENT INSTRUCTIONS
Medicare Wellness  Personal Prevention Plan of Service     Date of Office Visit:  2019  Encounter Provider:  Dariana Dalton MD  Place of Service:  [facility] INTERNAL MEDIC[facility]  Patient Name: Branden Brady  :  1950    As part of the Medicare Wellness portion of your visit today, we are providing you with this personalized preventive plan of services (PPPS). This plan is based upon recommendations of the [place] Preventive Services Task Force (USPSTF) and the Advisory Committee on Immunization Practices (ACIP).    This lists the preventive care services that should be considered, and provides dates of when you are due. Items listed as completed are up-to-date and do not require any further intervention.    Health Maintenance   Topic Date Due   • ZOSTER VACCINE (2 of 3) 2016   • HEPATITIS C SCREENING  2016   • COLONOSCOPY  2016   • MEDICARE ANNUAL WELLNESS  2019   • DXA SCAN  2019   • LIPID PANEL  2019   • TDAP/TD VACCINES (2 - Td) 2021   • INFLUENZA VACCINE  Completed   • PNEUMOCOCCAL VACCINES (65+ LOW/MEDIUM RISK)  Completed     Check with your pharmacy about the new shingles (zoster) vaccine, Shingrix.    Orders Placed This Encounter   Procedures   • DEXA Bone Density Axial     Standing Status:   Future     Standing Expiration Date:   2020     Order Specific Question:   Reason for Exam:     Answer:   screening for osteoporosis   • Comprehensive Metabolic Panel     Standing Status:   Future   • Urinalysis With Microscopic If Indicated (No Culture) - Urine, Clean Catch     Standing Status:   Future     Standing Expiration Date:   2020   • Lipid Panel     Standing Status:   Future     Standing Expiration Date:   2020   • TSH Rfx On Abnormal To Free T4     Standing Status:   Future     Standing Expiration Date:   2020   • PSA Screen     Standing Status:   Future   • Hepatitis C Antibody     Standing Status:   Future      Standing Expiration Date:   2/5/2020   • Sedimentation Rate     Standing Status:   Future     Standing Expiration Date:   2/5/2020   • High Sensitivity CRP     Standing Status:   Future   • Vitamin D 25 Hydroxy     Standing Status:   Future     Standing Expiration Date:   2/5/2020   • Fibrinogen     Standing Status:   Future     Standing Expiration Date:   2/5/2020   • DHEA     Standing Status:   Future     Standing Expiration Date:   2/5/2020   • Estradiol     Standing Status:   Future     Standing Expiration Date:   2/5/2020   • Testosterone (Free & Total), LC / MS     Standing Status:   Future     Standing Expiration Date:   2/5/2020   • POC FECAL OCCULT BLOOD BY IMMUNOASSAY   • CBC & Differential     Standing Status:   Future     Standing Expiration Date:   2/5/2020     Order Specific Question:   Manual Differential     Answer:   No       No Follow-up on file.        Advance Directive  Advance directives are legal documents that let you make choices ahead of time about your health care and medical treatment in case you become unable to communicate for yourself. Advance directives are a way for you to communicate your wishes to family, friends, and health care providers. This can help convey your decisions about end-of-life care if you become unable to communicate.  Discussing and writing advance directives should happen over time rather than all at once. Advance directives can be changed depending on your situation and what you want, even after you have signed the advance directives.  If you do not have an advance directive, some states assign family decision makers to act on your behalf based on how closely you are related to them. Each state has its own laws regarding advance directives. You may want to check with your health care provider, , or state representative about the laws in your state. There are different types of advance directives, such as:  · Medical power of .  · Living  will.  · Do not resuscitate (DNR) or do not attempt resuscitation (DNAR) order.    Health care proxy and medical power of   A health care proxy, also called a health care agent, is a person who is appointed to make medical decisions for you in cases in which you are unable to make the decisions yourself. Generally, people choose someone they know well and trust to represent their preferences. Make sure to ask this person for an agreement to act as your proxy. A proxy may have to exercise judgment in the event of a medical decision for which your wishes are not known.  A medical power of  is a legal document that names your health care proxy. Depending on the laws in your state, after the document is written, it may also need to be:  · Signed.  · Notarized.  · Dated.  · Copied.  · Witnessed.  · Incorporated into your medical record.    You may also want to appoint someone to manage your financial affairs in a situation in which you are unable to do so. This is called a durable power of  for finances. It is a separate legal document from the durable power of  for health care. You may choose the same person or someone different from your health care proxy to act as your agent in financial matters.  If you do not appoint a proxy, or if there is a concern that the proxy is not acting in your best interests, a court-appointed guardian may be designated to act on your behalf.  Living will  A living will is a set of instructions documenting your wishes about medical care when you cannot express them yourself. Health care providers should keep a copy of your living will in your medical record. You may want to give a copy to family members or friends. To alert caregivers in case of an emergency, you can place a card in your wallet to let them know that you have a living will and where they can find it. A living will is used if you become:  · Terminally ill.  · Incapacitated.  · Unable to  communicate or make decisions.    Items to consider in your living will include:  · The use or non-use of life-sustaining equipment, such as dialysis machines and breathing machines (ventilators).  · A DNR or DNAR order, which is the instruction not to use cardiopulmonary resuscitation (CPR) if breathing or heartbeat stops.  · The use or non-use of tube feeding.  · Withholding of food and fluids.  · Comfort (palliative) care when the goal becomes comfort rather than a cure.  · Organ and tissue donation.    A living will does not give instructions for distributing your money and property if you should pass away. It is recommended that you seek the advice of a  when writing a will. Decisions about taxes, beneficiaries, and asset distribution will be legally binding. This process can relieve your family and friends of any concerns surrounding disputes or questions that may come up about the distribution of your assets.  DNR or DNAR  A DNR or DNAR order is a request not to have CPR in the event that your heart stops beating or you stop breathing. If a DNR or DNAR order has not been made and shared, a health care provider will try to help any patient whose heart has stopped or who has stopped breathing. If you plan to have surgery, talk with your health care provider about how your DNR or DNAR order will be followed if problems occur.  Summary  · Advance directives are the legal documents that allow you to make choices ahead of time about your health care and medical treatment in case you become unable to communicate for yourself.  · The process of discussing and writing advance directives should happen over time. You can change the advance directives, even after you have signed them.  · Advance directives include DNR or DNAR orders, living seymour, and designating an agent as your medical power of .  This information is not intended to replace advice given to you by your health care provider. Make sure you  discuss any questions you have with your health care provider.  Document Released: 03/26/2009 Document Revised: 11/06/2017 Document Reviewed: 11/06/2017  [employer] Interactive Patient Education © 2017 [employer]

## 2019-02-06 LAB
ESTRADIOL SERPL HS-MCNC: 28.1 PG/ML (ref 7.6–42.6)
HCYS SERPL-SCNC: 11.1 UMOL/L (ref 0–15)

## 2019-02-08 LAB
DHEA SERPL-MCNC: 107 NG/DL (ref 31–701)
TESTOST FREE SERPL-MCNC: 7.6 PG/ML (ref 6.6–18.1)
TESTOST SERPL-MCNC: 982.1 NG/DL (ref 264–916)

## 2019-03-05 ENCOUNTER — HOSPITAL ENCOUNTER (OUTPATIENT)
Dept: BONE DENSITY | Facility: HOSPITAL | Age: 69
Discharge: HOME | End: 2019-03-05
Admitting: INTERNAL MEDICINE

## 2019-03-05 DIAGNOSIS — M85.88 OSTEOPENIA OF LUMBAR SPINE: ICD-10-CM

## 2019-03-05 PROCEDURE — 77080 DXA BONE DENSITY AXIAL: CPT

## 2019-08-06 ENCOUNTER — OFFICE VISIT (OUTPATIENT)
Dept: INTERNAL MEDICINE | Facility: CLINIC | Age: 69
End: 2019-08-06

## 2019-08-06 VITALS
BODY MASS INDEX: 28.56 KG/M2 | SYSTOLIC BLOOD PRESSURE: 136 MMHG | WEIGHT: 204 LBS | HEIGHT: 71 IN | DIASTOLIC BLOOD PRESSURE: 82 MMHG

## 2019-08-06 DIAGNOSIS — E78.5 HYPERLIPIDEMIA, UNSPECIFIED HYPERLIPIDEMIA TYPE: Primary | ICD-10-CM

## 2019-08-06 LAB
CHOLEST SERPL-MCNC: 216 MG/DL (ref 0–200)
HDLC SERPL-MCNC: 79 MG/DL (ref 40–60)
LDLC SERPL CALC-MCNC: 127 MG/DL (ref 0–100)
TRIGL SERPL-MCNC: 50 MG/DL (ref 0–150)
VLDLC SERPL-MCNC: 10 MG/DL

## 2019-08-06 PROCEDURE — 99212 OFFICE O/P EST SF 10 MIN: CPT | Performed by: INTERNAL MEDICINE

## 2019-08-06 RX ORDER — TRETINOIN 0.5 MG/G
CREAM TOPICAL NIGHTLY
Refills: 5 | COMMUNITY
Start: 2019-06-06

## 2019-08-06 NOTE — PROGRESS NOTES
"Chief Complaint   Patient presents with   • Hyperlipidemia       Subjective   Branden Brady is a 69 y.o. male.     Hyperlipidemia   This is a chronic problem. Recent lipid tests were reviewed and are high. He has no history of diabetes or obesity. There are no known factors aggravating his hyperlipidemia. Pertinent negatives include no chest pain. Current antihyperlipidemic treatment includes diet change and exercise. The current treatment provides mild improvement of lipids. There are no compliance problems.         The following portions of the patient's history were reviewed and updated as appropriate: allergies, current medications, past family history, past medical history, past social history, past surgical history and problem list.    Review of Systems   Cardiovascular: Negative for chest pain, palpitations and leg swelling.         Current Outpatient Medications:   •  aspirin 81 MG EC tablet, Take 81 mg by mouth Daily., Disp: , Rfl:   •  Calcium Citrate-Vitamin D (CALCIUM + D PO), Take  by mouth Daily., Disp: , Rfl:   •  Multiple Vitamins-Minerals (MULTIVITAMIN ADULT PO), Take  by mouth Daily., Disp: , Rfl:   •  tretinoin (RETIN-A) 0.05 % cream, Every Night., Disp: , Rfl: 5        Objective     /82 (BP Location: Right arm, Patient Position: Sitting, Cuff Size: Adult)   Ht 179.1 cm (70.5\")   Wt 92.5 kg (204 lb)   BMI 28.86 kg/m²     Physical Exam   Constitutional: He is oriented to person, place, and time. He appears well-developed and well-nourished. No distress.   Neck: Normal carotid pulses present. Carotid bruit is not present.   Cardiovascular: Regular rhythm, S1 normal and S2 normal. Exam reveals no gallop and no friction rub.   No murmur heard.  Pulses:       Carotid pulses are 2+ on the right side, and 2+ on the left side.  Pulmonary/Chest: Effort normal and breath sounds normal. He has no wheezes. He has no rhonchi. He has no rales. Chest wall is not dull to percussion.   Musculoskeletal: " He exhibits no edema.   Neurological: He is alert and oriented to person, place, and time.   Skin: Skin is warm and dry.   Nursing note and vitals reviewed.        Assessment/Plan   Branden was seen today for hyperlipidemia.    Diagnoses and all orders for this visit:    Hyperlipidemia, unspecified hyperlipidemia type  -     Lipid Panel; Future      Blood pressure on recheck 150/86.  Discussed with him.  Advised in 136/82 is also consider high.  He monitors his blood pressure at home.  He states it is always good, less than 120/80.  He will continue to monitor his blood pressure.  He states that he does take it when he is lying down.  Advised him to try taking it when he sitting up.  Discussed hyperlipidemia.  Recommended statin medication.  He is reluctant to do this.  He is going to research statins and make a decision.  Advised him he could consider doing the coronary calcium score CT scan.  Advised him insurance would not cover this.

## 2019-09-24 ENCOUNTER — OFFICE VISIT (OUTPATIENT)
Dept: INTERNAL MEDICINE | Facility: CLINIC | Age: 69
End: 2019-09-24

## 2019-09-24 VITALS
HEIGHT: 71 IN | HEART RATE: 68 BPM | OXYGEN SATURATION: 98 % | SYSTOLIC BLOOD PRESSURE: 116 MMHG | DIASTOLIC BLOOD PRESSURE: 78 MMHG | BODY MASS INDEX: 28.56 KG/M2 | WEIGHT: 204 LBS

## 2019-09-24 DIAGNOSIS — M71.122 OTHER INFECTIVE BURSITIS, LEFT ELBOW: Primary | ICD-10-CM

## 2019-09-24 PROCEDURE — 99213 OFFICE O/P EST LOW 20 MIN: CPT | Performed by: INTERNAL MEDICINE

## 2019-09-24 RX ORDER — INFLUENZA A VIRUS A/MICHIGAN/45/2015 X-275 (H1N1) ANTIGEN (FORMALDEHYDE INACTIVATED), INFLUENZA A VIRUS A/SINGAPORE/INFIMH-16-0019/2016 IVR-186 (H3N2) ANTIGEN (FORMALDEHYDE INACTIVATED), AND INFLUENZA B VIRUS B/MARYLAND/15/2016 BX-69A (A B/COLORADO/6/2017-LIKE VIRUS) ANTIGEN (FORMALDEHYDE INACTIVATED) 60; 60; 60 UG/.5ML; UG/.5ML; UG/.5ML
INJECTION, SUSPENSION INTRAMUSCULAR
Refills: 0 | COMMUNITY
Start: 2019-09-18 | End: 2019-09-24

## 2019-09-24 RX ORDER — CEPHALEXIN 500 MG/1
500 CAPSULE ORAL 3 TIMES DAILY
Qty: 30 CAPSULE | Refills: 0 | Status: SHIPPED | OUTPATIENT
Start: 2019-09-24 | End: 2020-02-12

## 2019-09-24 NOTE — PROGRESS NOTES
"Chief Complaint   Patient presents with   • Joint Swelling     left       Subjective   Branden Brady is a 69 y.o. male.     Joint Swelling   This is a new problem. The current episode started today. The problem occurs constantly. The problem has been gradually worsening. Associated symptoms include arthralgias and joint swelling. Pertinent negatives include no chills, diaphoresis or fever. He has tried NSAIDs for the symptoms.        The following portions of the patient's history were reviewed and updated as appropriate: allergies, current medications, past family history, past medical history, past social history, past surgical history and problem list.    Review of Systems   Constitutional: Negative for appetite change, chills, diaphoresis and fever.   Musculoskeletal: Positive for arthralgias and joint swelling.         Current Outpatient Medications:   •  aspirin 81 MG EC tablet, Take 81 mg by mouth Daily., Disp: , Rfl:   •  Calcium Citrate-Vitamin D (CALCIUM + D PO), Take  by mouth Daily., Disp: , Rfl:   •  Multiple Vitamins-Minerals (MULTIVITAMIN ADULT PO), Take  by mouth Daily., Disp: , Rfl:   •  tretinoin (RETIN-A) 0.05 % cream, Every Night., Disp: , Rfl: 5  •  cephalexin (KEFLEX) 500 MG capsule, Take 1 capsule by mouth 3 (Three) Times a Day., Disp: 30 capsule, Rfl: 0        Objective     /78   Pulse 68   Ht 179.1 cm (70.5\")   Wt 92.5 kg (204 lb)   SpO2 98%   BMI 28.86 kg/m²     Physical Exam   Constitutional: He appears well-developed and well-nourished. No distress.   Pulmonary/Chest: No respiratory distress.   Musculoskeletal:        Left elbow: He exhibits swelling. Tenderness found.   There is swelling about the left olecranon process with erythema and warmth.  There is tenderness to palpation.   Nursing note and vitals reviewed.        Assessment/Plan   Branden was seen today for joint swelling.    Diagnoses and all orders for this visit:    Other infective bursitis, left elbow    Other " orders  -     cephalexin (KEFLEX) 500 MG capsule; Take 1 capsule by mouth 3 (Three) Times a Day.      Acute onset of infected bursitis of his left elbow.  Advised him to allow his arm to rest.  He was planning on swimming this evening.  Advised him not to do that.  Referral to orthopedics.  If he does not respond to antibiotic, he may need aspiration.

## 2019-09-26 ENCOUNTER — OFFICE VISIT (OUTPATIENT)
Dept: ORTHOPEDIC SURGERY | Facility: CLINIC | Age: 69
End: 2019-09-26

## 2019-09-26 VITALS — TEMPERATURE: 98 F | HEIGHT: 72 IN | BODY MASS INDEX: 27.93 KG/M2 | WEIGHT: 206.2 LBS

## 2019-09-26 DIAGNOSIS — M25.522 LEFT ELBOW PAIN: Primary | ICD-10-CM

## 2019-09-26 DIAGNOSIS — M70.22 OLECRANON BURSITIS OF LEFT ELBOW: ICD-10-CM

## 2019-09-26 PROCEDURE — 73070 X-RAY EXAM OF ELBOW: CPT | Performed by: ORTHOPAEDIC SURGERY

## 2019-09-26 PROCEDURE — 87186 SC STD MICRODIL/AGAR DIL: CPT | Performed by: ORTHOPAEDIC SURGERY

## 2019-09-26 PROCEDURE — 20605 DRAIN/INJ JOINT/BURSA W/O US: CPT | Performed by: ORTHOPAEDIC SURGERY

## 2019-09-26 PROCEDURE — 87205 SMEAR GRAM STAIN: CPT | Performed by: ORTHOPAEDIC SURGERY

## 2019-09-26 PROCEDURE — 99204 OFFICE O/P NEW MOD 45 MIN: CPT | Performed by: ORTHOPAEDIC SURGERY

## 2019-09-26 PROCEDURE — 87147 CULTURE TYPE IMMUNOLOGIC: CPT | Performed by: ORTHOPAEDIC SURGERY

## 2019-09-26 PROCEDURE — 87070 CULTURE OTHR SPECIMN AEROBIC: CPT | Performed by: ORTHOPAEDIC SURGERY

## 2019-09-26 RX ORDER — SULFAMETHOXAZOLE AND TRIMETHOPRIM 800; 160 MG/1; MG/1
1 TABLET ORAL 2 TIMES DAILY
Qty: 14 TABLET | Refills: 0 | Status: SHIPPED | OUTPATIENT
Start: 2019-09-26 | End: 2019-10-01 | Stop reason: ALTCHOICE

## 2019-09-26 NOTE — PROGRESS NOTES
New Left Elbow      Patient: Branden Brady        YOB: 1950        Chief Complaints: Elbow pain left  Chief Complaint   Patient presents with   • Left Elbow - Establish Care, Pain           History of Present Illness:  This is a  69 y.o. male who presents with some left elbow swelling around the tip of his elbow this been ongoing for several days no history injury that he can recall no change in activity no history of similar symptoms redness has worsened he was put on some some Keflex has not really made much improvement in fact is got a little bit worse.  Current symptoms are severe intermittent stabbing aching with redness swelling somewhat better with ice his past medical history is remarkable hyperlipidemia osteopenia  Chief Complaint   Patient presents with   • Left Elbow - Establish Care, Pain             Allergies:   Allergies   Allergen Reactions   • Aspirin Other (See Comments)     Stomach upset   • Penicillins        Medications:   Home Medications:  Current Outpatient Medications on File Prior to Visit   Medication Sig   • aspirin 81 MG EC tablet Take 81 mg by mouth Daily.   • cephalexin (KEFLEX) 500 MG capsule Take 1 capsule by mouth 3 (Three) Times a Day.   • Multiple Vitamins-Minerals (MULTIVITAMIN ADULT PO) Take  by mouth Daily.   • tretinoin (RETIN-A) 0.05 % cream Every Night.   • Calcium Citrate-Vitamin D (CALCIUM + D PO) Take  by mouth Daily.     No current facility-administered medications on file prior to visit.      Current Medications:  Scheduled Meds:  Continuous Infusions:  No current facility-administered medications for this visit.   PRN Meds:.    Past Medical History:   Diagnosis Date   • Arthritis     Cervical, thoracic and lumbar spine   • Dry eyes    • Exertional shortness of breath    • Family history of ischemic heart disease    • Hyperlipidemia    • Osteopenia    • Otalgia    • Subdeltoid bursitis    • Thoracic compression fracture (CMS/HCC)         Past Surgical  "History:   Procedure Laterality Date   • COLONOSCOPY  08/14/2015    Dr. Elaina Burrell, 2-3 small polyps   • SHOULDER SURGERY Right 2013    Right         Social History     Occupational History   • Not on file   Tobacco Use   • Smoking status: Never Smoker   • Smokeless tobacco: Never Used   Substance and Sexual Activity   • Alcohol use: Yes     Comment: Socially    • Drug use: No   • Sexual activity: Yes    Social History     Social History Narrative   • Not on file        Family History   Problem Relation Age of Onset   • Rheum arthritis Mother    • Heart failure Mother    • Hypertension Father              Review of Systems: 14 point review of systems are remarkable for the pertinent positives listed in the chart by the patient the remainder negative  Review of Systems      Physical Exam: 69 y.o. male  General Appearance:    Alert, cooperative, in no acute distress                 Vitals:    09/26/19 1508   Temp: 98 °F (36.7 °C)   Weight: 93.5 kg (206 lb 3.2 oz)   Height: 182.9 cm (72\")      Patient is alert and read ×3 no acute distress appears her above-listed at height weight and age.  Affect is normal respiratory rate is normal unlabored. Heart rate regular rate rhythm, sclera, dentition and hearing are normal for the purpose of this exam.        Ortho Exam       Physical exam the left elbow he does have some fluid in his olecranon bursa it is red it is tender he does have extension of that redness into his forearm he has full elbow range of motion there is no obvious scrape scratch or skin violation the area of the swelling.    Radiology:   AP, Lateral of the left elbow were ordered/reviewed to evaluate pain.  I am no compared to films these are normal I see no evidence of any acute bony pathology      Assessment/Plan: Left olecranon bursitis that is infected plan is to proceed with aspiration of the fluid which was done with sterile technique anesthetizing skin with lidocaine aspirate about 5 cc of " blood-tinged fluid I would be concerned about MRSA therefore I will change his antibiotics to Bactrim.  I will see him back on Tuesday to ensure that he is improving    Medium Joint Arthrocentesis: L elbow  Date/Time: 9/26/2019 3:34 PM  Consent given by: patient  Site marked: site marked  Timeout: Immediately prior to procedure a time out was called to verify the correct patient, procedure, equipment, support staff and site/side marked as required   Supporting Documentation  Indications: pain   Procedure Details  Location: elbow - L elbow  Preparation: Patient was prepped and draped in the usual sterile fashion  Needle size: 22 G  Approach: posterior  Medications administered: 3 mL lidocaine (cardiac)  Analysis: fluid sample sent for laboratory analysis  Patient tolerance: patient tolerated the procedure well with no immediate complications

## 2019-09-27 ENCOUNTER — TELEPHONE (OUTPATIENT)
Dept: ORTHOPEDIC SURGERY | Facility: CLINIC | Age: 69
End: 2019-09-27

## 2019-09-29 LAB
BACTERIA SPEC AEROBE CULT: ABNORMAL
GRAM STN SPEC: ABNORMAL
GRAM STN SPEC: ABNORMAL

## 2019-09-30 NOTE — TELEPHONE ENCOUNTER
Cultures were back this morning and it is MRSA it is sensitive to the antibiotic that he is on but was not sensitive to penicillin

## 2019-10-01 ENCOUNTER — OFFICE VISIT (OUTPATIENT)
Dept: ORTHOPEDIC SURGERY | Facility: CLINIC | Age: 69
End: 2019-10-01

## 2019-10-01 VITALS — WEIGHT: 202.2 LBS | BODY MASS INDEX: 28.95 KG/M2 | TEMPERATURE: 98.2 F | HEIGHT: 70 IN

## 2019-10-01 DIAGNOSIS — M70.22 OLECRANON BURSITIS OF LEFT ELBOW: Primary | ICD-10-CM

## 2019-10-01 PROCEDURE — 99212 OFFICE O/P EST SF 10 MIN: CPT | Performed by: ORTHOPAEDIC SURGERY

## 2019-10-01 RX ORDER — SULFAMETHOXAZOLE AND TRIMETHOPRIM 800; 160 MG/1; MG/1
1 TABLET ORAL 2 TIMES DAILY
Qty: 14 TABLET | Refills: 0 | Status: SHIPPED | OUTPATIENT
Start: 2019-10-01 | End: 2019-10-08 | Stop reason: ALTCHOICE

## 2019-10-01 RX ORDER — SULFAMETHOXAZOLE AND TRIMETHOPRIM 800; 160 MG/1; MG/1
1 TABLET ORAL 2 TIMES DAILY
Qty: 30 TABLET | Refills: 0 | COMMUNITY
End: 2019-10-01 | Stop reason: ALTCHOICE

## 2019-10-01 NOTE — PROGRESS NOTES
"+Left Elbow Follow Up      Patient: Branden Brady        YOB: 1950            Chief Complaints: elbow pain left  Chief Complaint   Patient presents with   • Left Elbow - Follow-up, Pain         History of Present Illness: Patient is here follow-up of infected left olecranon bursitis aspirated at last visit his last culture came up 1+ MRSA sensitive to bank and Clinda and Bactrim he is currently on Bactrim he is doing much better still little tenderness a little bit of swelling at the tip of the elbow but much improved      Physical Exam: 69 y.o. male  General Appearance:    Alert, cooperative, in no acute distress                   Vitals:    10/01/19 1342   Temp: 98.2 °F (36.8 °C)   Weight: 91.7 kg (202 lb 3.2 oz)   Height: 178.4 cm (70.25\")        Patient is alert and read ×3 no acute distress appears her above-listed at height weight and age.  Affect is normal respiratory rate is normal unlabored. Heart rate regular rate rhythm, sclera, dentition and hearing are normal for the purpose of this exam.        Ortho Exam exam the left elbow he has a little bit of fluid in the olecranon bursa redness is localized to that area as redness is much improved elbow range of motion is normal full and symmetric    Procedures    Labs are as above I reviewed them      Assessment/Plan: Infected olecranon bursitis he is doing much better I will have him continue the Bactrim for 1 more week I will see him back next week if he still has any residual fluid left in that we will space I will aspirated        "

## 2019-10-08 ENCOUNTER — OFFICE VISIT (OUTPATIENT)
Dept: ORTHOPEDIC SURGERY | Facility: CLINIC | Age: 69
End: 2019-10-08

## 2019-10-08 VITALS — BODY MASS INDEX: 28.28 KG/M2 | HEIGHT: 71 IN | WEIGHT: 202 LBS | TEMPERATURE: 98.2 F

## 2019-10-08 DIAGNOSIS — M70.22 OLECRANON BURSITIS OF LEFT ELBOW: Primary | ICD-10-CM

## 2019-10-08 PROCEDURE — 87070 CULTURE OTHR SPECIMN AEROBIC: CPT | Performed by: ORTHOPAEDIC SURGERY

## 2019-10-08 PROCEDURE — 99212 OFFICE O/P EST SF 10 MIN: CPT | Performed by: ORTHOPAEDIC SURGERY

## 2019-10-08 PROCEDURE — 87205 SMEAR GRAM STAIN: CPT | Performed by: ORTHOPAEDIC SURGERY

## 2019-10-08 PROCEDURE — 87015 SPECIMEN INFECT AGNT CONCNTJ: CPT | Performed by: ORTHOPAEDIC SURGERY

## 2019-10-08 RX ORDER — SULFAMETHOXAZOLE AND TRIMETHOPRIM 800; 160 MG/1; MG/1
1 TABLET ORAL 2 TIMES DAILY
Qty: 30 TABLET | Refills: 0 | COMMUNITY
End: 2020-02-12

## 2019-10-08 NOTE — PROGRESS NOTES
"Left Olecranon Bursitis Follow Up      Patient: Branden Brady        YOB: 1950            Chief Complaints: elbow pain left  Chief Complaint   Patient presents with   • Left Elbow - Follow-up, Pain           History of Present Illness: Patient is here follow-up of left olecranon bursitis his symptoms have improved every week but he still has some residual redness over the tip of the olecranon a little bit of fluid there talked about options I think he and I both agree we should take his last little bit of fluid out      Physical Exam: 69 y.o. male  General Appearance:    Alert, cooperative, in no acute distress                   Vitals:    10/08/19 1514   Temp: 98.2 °F (36.8 °C)   Weight: 91.6 kg (202 lb)   Height: 180.3 cm (71\")        Patient is alert and read ×3 no acute distress appears her above-listed at height weight and age.  Affect is normal respiratory rate is normal unlabored. Heart rate regular rate rhythm, sclera, dentition and hearing are normal for the purpose of this exam.        Ortho Exam  's exam the left elbow he is Apsley full range of motion his redness has resolved except for the olecranon itself he has a tiny bit of fluid left there  Procedures    I did aspirate proximally 1 cc of blood-tinged fluid after anesthetizing the elbow I did send this for Gram stain CNS per the patient's request      Assessment/Plan:    Olecranon bursitis I think is reasonable to try to get that last little bit of fluid out which we did without difficulty I will keep him on Bactrim 1 more week I will see him back next week hopefully for a last visit  "

## 2019-10-09 RX ORDER — SULFAMETHOXAZOLE AND TRIMETHOPRIM 800; 160 MG/1; MG/1
1 TABLET ORAL 2 TIMES DAILY
Qty: 18 TABLET | Refills: 0 | Status: CANCELLED | OUTPATIENT
Start: 2019-10-09

## 2019-10-09 NOTE — TELEPHONE ENCOUNTER
Spoke to patient about lab results.  He wanted to know if there was going to be a 3 day read on it (like the first time).    Also, more importantly, there was an issue with getting the Bactrim order at his pharmacy.  He did take his last pill today.     They asked if you would resend with 18 tabs.  I did make change.  Please check me.

## 2019-10-13 LAB
BACTERIA FLD CULT: NORMAL
GRAM STN SPEC: NORMAL
GRAM STN SPEC: NORMAL

## 2019-10-16 ENCOUNTER — TELEPHONE (OUTPATIENT)
Dept: ORTHOPEDIC SURGERY | Facility: CLINIC | Age: 69
End: 2019-10-16

## 2019-10-16 NOTE — TELEPHONE ENCOUNTER
Please tell him no organisms were seen no growth at 5 days would finish out his antibiotic and then I will see him back next week

## 2019-10-18 ENCOUNTER — OFFICE VISIT (OUTPATIENT)
Dept: ORTHOPEDIC SURGERY | Facility: CLINIC | Age: 69
End: 2019-10-18

## 2019-10-18 VITALS — HEIGHT: 72 IN | BODY MASS INDEX: 27.22 KG/M2 | WEIGHT: 201 LBS | TEMPERATURE: 97.8 F

## 2019-10-18 DIAGNOSIS — M70.22 OLECRANON BURSITIS OF LEFT ELBOW: Primary | ICD-10-CM

## 2019-10-18 PROCEDURE — 99212 OFFICE O/P EST SF 10 MIN: CPT | Performed by: ORTHOPAEDIC SURGERY

## 2019-10-18 NOTE — PROGRESS NOTES
Left Elbow Follow Up      Patient: Branden Brady        YOB: 1950            Chief Complaints: elbow pain left      History of Present Illness: Patient is here probable infected olecranon bursitis he states is deftly better still little red but no fluid no pain habits where he is      Physical Exam: 69 y.o. male  General Appearance:    Alert, cooperative, in no acute distress                 There were no vitals filed for this visit.     Patient is alert and read ×3 no acute distress appears her above-listed at height weight and age.  Affect is normal respiratory rate is normal unlabored. Heart rate regular rate rhythm, sclera, dentition and hearing are normal for the purpose of this exam.        Ortho Exam physical exam the left elbow little bit of redness over the olecranon there is no swelling is full range of motion no palpable tenderness  Procedures          Assessment/Plan:  Infected olecranon bursitis his last aspirate showed no growth at 5 days I think he continue progress his activities not on any pressure on the elbow if his symptoms return with being off the antibiotic he is going to give me a call

## 2020-02-12 ENCOUNTER — RESULTS ENCOUNTER (OUTPATIENT)
Dept: INTERNAL MEDICINE | Facility: CLINIC | Age: 70
End: 2020-02-12

## 2020-02-12 ENCOUNTER — OFFICE VISIT (OUTPATIENT)
Dept: INTERNAL MEDICINE | Facility: CLINIC | Age: 70
End: 2020-02-12

## 2020-02-12 VITALS
BODY MASS INDEX: 27.44 KG/M2 | HEART RATE: 86 BPM | WEIGHT: 202.6 LBS | RESPIRATION RATE: 16 BRPM | HEIGHT: 72 IN | SYSTOLIC BLOOD PRESSURE: 130 MMHG | DIASTOLIC BLOOD PRESSURE: 84 MMHG | OXYGEN SATURATION: 98 % | TEMPERATURE: 98.6 F

## 2020-02-12 DIAGNOSIS — R73.01 ELEVATED FASTING GLUCOSE: ICD-10-CM

## 2020-02-12 DIAGNOSIS — E78.5 HYPERLIPIDEMIA, UNSPECIFIED HYPERLIPIDEMIA TYPE: ICD-10-CM

## 2020-02-12 DIAGNOSIS — R79.89 ELEVATED TSH: ICD-10-CM

## 2020-02-12 DIAGNOSIS — Z12.5 PROSTATE CANCER SCREENING: ICD-10-CM

## 2020-02-12 DIAGNOSIS — Z00.00 MEDICARE ANNUAL WELLNESS VISIT, SUBSEQUENT: Primary | ICD-10-CM

## 2020-02-12 DIAGNOSIS — Z12.11 ENCOUNTER FOR HEMOCCULT SCREENING: ICD-10-CM

## 2020-02-12 DIAGNOSIS — M85.88 OSTEOPENIA OF LUMBAR SPINE: ICD-10-CM

## 2020-02-12 LAB — HEMOCCULT STL QL IA: NEGATIVE

## 2020-02-12 PROCEDURE — G0328 FECAL BLOOD SCRN IMMUNOASSAY: HCPCS | Performed by: INTERNAL MEDICINE

## 2020-02-12 PROCEDURE — 99213 OFFICE O/P EST LOW 20 MIN: CPT | Performed by: INTERNAL MEDICINE

## 2020-02-12 PROCEDURE — G0439 PPPS, SUBSEQ VISIT: HCPCS | Performed by: INTERNAL MEDICINE

## 2020-02-12 RX ORDER — TRIAMCINOLONE ACETONIDE 55 UG/1
2 SPRAY, METERED NASAL DAILY
COMMUNITY
End: 2021-01-11 | Stop reason: SDUPTHER

## 2020-02-12 NOTE — PROGRESS NOTES
Chief Complaint   Patient presents with   • Medicare Wellness-subsequent     Pt presents here today for a medicare wellness visit.   • Hyperlipidemia   • Elevated fasting glucose       Subjective   Branden Brady is a 69 y.o. male.     He comes in for subsequent AWV.  He also would like follow-up on hyperlipidemia and blood work.  He manages hyperlipidemia with diet and exercise.  In the past, he has also had elevated fasting glucose and elevated TSH.    Hyperlipidemia   This is a chronic problem. The problem is controlled. Recent lipid tests were reviewed and are variable. He has no history of diabetes or obesity. There are no known factors aggravating his hyperlipidemia. Pertinent negatives include no chest pain, leg pain, myalgias or shortness of breath. Current antihyperlipidemic treatment includes exercise and diet change. The current treatment provides moderate improvement of lipids. There are no compliance problems.       Pre-diabetes/Elevated fasting glucose:  This is a chronic problem.  Patient has been advised to follow prudent diet, avoid sugar, exercise regularly.  He denies polyuria, polydipsia, vision change appetite change, unexplained weight loss.   Lab Results   Component Value Date    HGBA1C 5.49 02/05/2019      The following portions of the patient's history were reviewed and updated as appropriate: allergies, current medications, past family history, past medical history, past social history, past surgical history and problem list.    Review of Systems   Constitutional: Negative for appetite change, unexpected weight gain and unexpected weight loss.   HENT: Negative for nosebleeds.    Eyes: Negative for blurred vision and double vision.   Respiratory: Negative for cough and shortness of breath.    Cardiovascular: Negative for chest pain, palpitations and leg swelling.   Endocrine: Negative for cold intolerance, heat intolerance, polydipsia and polyuria.   Musculoskeletal: Negative for myalgias.  "        Current Outpatient Medications:   •  Multiple Vitamins-Minerals (MULTIVITAMIN ADULT PO), Take  by mouth Daily., Disp: , Rfl:   •  tretinoin (RETIN-A) 0.05 % cream, Every Night., Disp: , Rfl: 5  •  Triamcinolone Acetonide (NASACORT) 55 MCG/ACT nasal inhaler, 2 sprays into the nostril(s) as directed by provider Daily., Disp: , Rfl:         Objective     /84 (BP Location: Left arm, Patient Position: Sitting, Cuff Size: Adult)   Pulse 86   Temp 98.6 °F (37 °C) (Oral)   Resp 16   Ht 182.9 cm (72\")   Wt 91.9 kg (202 lb 9.6 oz)   SpO2 98%   BMI 27.48 kg/m²     Physical Exam   Constitutional: He is oriented to person, place, and time. He appears well-developed and well-nourished. No distress.   Neck: Normal carotid pulses present. Carotid bruit is not present. No thyromegaly present.   Cardiovascular: Regular rhythm, S1 normal and S2 normal. Exam reveals no gallop and no friction rub.   No murmur heard.  Pulses:       Carotid pulses are 2+ on the right side, and 2+ on the left side.  Pulmonary/Chest: Effort normal and breath sounds normal. He has no wheezes. He has no rhonchi. He has no rales. Chest wall is not dull to percussion.   Abdominal: Soft. Bowel sounds are normal. There is no hepatosplenomegaly. There is no tenderness. There is no rebound and no guarding. No hernia. Hernia confirmed negative in the right inguinal area and confirmed negative in the left inguinal area.   Genitourinary: Rectum normal, prostate normal, testes normal and penis normal. Rectal exam shows guaiac negative stool.   Musculoskeletal: He exhibits no edema.   Lymphadenopathy:     He has no cervical adenopathy. No inguinal adenopathy noted on the right or left side.   Neurological: He is alert and oriented to person, place, and time. No cranial nerve deficit.   Skin: Skin is warm and dry.   Psychiatric: He has a normal mood and affect.   Nursing note and vitals reviewed.        Assessment/Plan   Branden was seen today for " medicare wellness-subsequent, hyperlipidemia and elevated fasting glucose.    Diagnoses and all orders for this visit:    Medicare annual wellness visit, subsequent    Hyperlipidemia, unspecified hyperlipidemia type  -     Comprehensive Metabolic Panel  -     CBC (No Diff)  -     TSH Rfx On Abnormal To Free T4  -     Lipid Panel  -     Urinalysis With Culture If Indicated -  -     High Sensitivity CRP; Future  -     Homocysteine; Future  -     Fibrinogen; Future  -     Sedimentation Rate    Elevated fasting glucose  -     Hemoglobin A1c    Elevated TSH  -     DHEA  -     Estradiol  -     Testosterone (Free & Total), LC / MS    Encounter for Hemoccult screening  -     POC FECAL OCCULT BLOOD BY IMMUNOASSAY    Prostate cancer screening  -     PSA Screen    Osteopenia of lumbar spine  -     Vitamin D 25 Hydroxy

## 2020-02-12 NOTE — PATIENT INSTRUCTIONS
Medicare Wellness  Personal Prevention Plan of Service     Date of Office Visit:  2020  Encounter Provider:  Dariana Dalton MD  Place of Service:  [facility]  Patient Name: Branden Brady  :  1950    As part of the Medicare Wellness portion of your visit today, we are providing you with this personalized preventive plan of services (PPPS). This plan is based upon recommendations of the [place] Preventive Services Task Force (USPSTF) and the Advisory Committee on Immunization Practices (ACIP).    This lists the preventive care services that should be considered, and provides dates of when you are due. Items listed as completed are up-to-date and do not require any further intervention.    Health Maintenance   Topic Date Due   • ZOSTER VACCINE (2 of 3) 2016   • MEDICARE ANNUAL WELLNESS  2020   • LIPID PANEL  2020   • COLONOSCOPY  2020   • TDAP/TD VACCINES (2 - Td) 2021   • DXA SCAN  2021   • HEPATITIS C SCREENING  Completed   • Pneumococcal Vaccine Once at 65 Years Old  Completed   • INFLUENZA VACCINE  Completed       Orders Placed This Encounter   Procedures   • Comprehensive Metabolic Panel   • CBC (No Diff)   • PSA Screen   • TSH Rfx On Abnormal To Free T4   • Lipid Panel   • Urinalysis With Culture If Indicated -   • DHEA   • Estradiol   • High Sensitivity CRP     Standing Status:   Future   • Homocysteine     Standing Status:   Future     Standing Expiration Date:   2021   • Testosterone (Free & Total), LC / MS   • Vitamin D 25 Hydroxy   • Hemoglobin A1c   • Fibrinogen     Standing Status:   Future     Standing Expiration Date:   2021   • Sedimentation Rate   • POC FECAL OCCULT BLOOD BY IMMUNOASSAY       Return in about 6 months (around 2020) for Follow up lipids fasting.    Check with your pharmacy about the new shingles (zoster)vaccine.

## 2020-02-12 NOTE — PROGRESS NOTES
The ABCs of the Annual Wellness Visit  Subsequent Medicare Wellness Visit    Chief Complaint   Patient presents with   • Medicare Wellness-subsequent     Pt presents here today for a medicare wellness visit.   • Hyperlipidemia   • Elevated fasting glucose       Subjective   History of Present Illness:  Branden Brady is a 69 y.o. male who presents for a Subsequent Medicare Wellness Visit.    HEALTH RISK ASSESSMENT    Recent Hospitalizations:  No hospitalization(s) within the last year.    Current Medical Providers:  Patient Care Team:  Dariana Dalton MD as PCP - General  Dariana Dalton MD as PCP - Family Medicine  Sajan Cortez MD as PCP - Claims Attributed    Smoking Status:  Social History     Tobacco Use   Smoking Status Never Smoker   Smokeless Tobacco Never Used       Alcohol Consumption:  Social History     Substance and Sexual Activity   Alcohol Use Yes    Comment: Socially        Depression Screen:   PHQ-2/PHQ-9 Depression Screening 2/12/2020   Little interest or pleasure in doing things 0   Feeling down, depressed, or hopeless 0   Trouble falling or staying asleep, or sleeping too much -   Feeling tired or having little energy -   Poor appetite or overeating -   Feeling bad about yourself - or that you are a failure or have let yourself or your family down -   Trouble concentrating on things, such as reading the newspaper or watching television -   Moving or speaking so slowly that other people could have noticed. Or the opposite - being so fidgety or restless that you have been moving around a lot more than usual -   Thoughts that you would be better off dead, or of hurting yourself in some way -   Total Score 0       Fall Risk Screen:  DENISEADI Fall Risk Assessment was completed, and patient is at LOW risk for falls.Assessment completed on:2/12/2020    Health Habits and Functional and Cognitive Screening:  Functional & Cognitive Status 2/12/2020   Do you have difficulty preparing food and eating? No    Do you have difficulty bathing yourself, getting dressed or grooming yourself? No   Do you have difficulty using the toilet? No   Do you have difficulty moving around from place to place? No   Do you have trouble with steps or getting out of a bed or a chair? No   Current Diet Well Balanced Diet   Dental Exam Up to date   Eye Exam Up to date   Exercise (times per week) 7 times per week   Current Exercise Activities Include Running/Jogging   Do you need help using the phone?  No   Are you deaf or do you have serious difficulty hearing?  No   Do you need help with transportation? No   Do you need help shopping? No   Do you need help preparing meals?  No   Do you need help with housework?  No   Do you need help with laundry? No   Do you need help taking your medications? No   Do you need help managing money? No   Do you ever drive or ride in a car without wearing a seat belt? No   Have you felt unusual stress, anger or loneliness in the last month? No   Who do you live with? Spouse   If you need help, do you have trouble finding someone available to you? No   Have you been bothered in the last four weeks by sexual problems? No   Do you have difficulty concentrating, remembering or making decisions? No         Does the patient have evidence of cognitive impairment? No    Asprin use counseling:Does not need ASA (and currently is not on it)    Age-appropriate Screening Schedule:  Refer to the list below for future screening recommendations based on patient's age, sex and/or medical conditions. Orders for these recommended tests are listed in the plan section. The patient has been provided with a written plan.    Health Maintenance   Topic Date Due   • ZOSTER VACCINE (2 of 3) 02/29/2016   • LIPID PANEL  08/06/2020   • COLONOSCOPY  08/14/2020   • TDAP/TD VACCINES (2 - Td) 02/01/2021   • DXA SCAN  03/05/2021   • INFLUENZA VACCINE  Completed          The following portions of the patient's history were reviewed and updated  as appropriate: allergies, current medications, past family history, past medical history, past social history, past surgical history and problem list.    Outpatient Medications Prior to Visit   Medication Sig Dispense Refill   • Multiple Vitamins-Minerals (MULTIVITAMIN ADULT PO) Take  by mouth Daily.     • tretinoin (RETIN-A) 0.05 % cream Every Night.  5   • Triamcinolone Acetonide (NASACORT) 55 MCG/ACT nasal inhaler 2 sprays into the nostril(s) as directed by provider Daily.     • Calcium Citrate-Vitamin D (CALCIUM + D PO) Take  by mouth Daily.     • Mometasone Furoate (NASONEX NA) into the nostril(s) as directed by provider.     • aspirin 81 MG EC tablet Take 81 mg by mouth Daily.     • cephalexin (KEFLEX) 500 MG capsule Take 1 capsule by mouth 3 (Three) Times a Day. 30 capsule 0   • sulfamethoxazole-trimethoprim (BACTRIM DS) 800-160 MG per tablet Take 1 tablet by mouth 2 (Two) Times a Day. 30 tablet 0     No facility-administered medications prior to visit.        Patient Active Problem List   Diagnosis   • Hyperlipidemia   • Chronic midline low back pain without sciatica   • DDD (degenerative disc disease), lumbar   • Colon cancer screening   • Elevated fasting glucose   • Olecranon bursitis of left elbow       Advanced Care Planning:  ACP discussion was held with the patient during this visit. Patient has an advance directive, copy requested.    Review of Systems    Compared to one year ago, the patient feels his physical health is the same.  Compared to one year ago, the patient feels his mental health is the same.    Reviewed chart for potential of high risk medication in the elderly: not applicable  Reviewed chart for potential of harmful drug interactions in the elderly:not applicable    Objective         Vitals:    02/12/20 0937   BP: 130/84   BP Location: Left arm   Patient Position: Sitting   Cuff Size: Adult   Pulse: 86   Resp: 16   Temp: 98.6 °F (37 °C)   TempSrc: Oral   SpO2: 98%   Weight: 91.9 kg  "(202 lb 9.6 oz)   Height: 182.9 cm (72\")       Body mass index is 27.48 kg/m².  Discussed the patient's BMI with him. The BMI above average - encouraged prudent diet and regular exercise.    Physical Exam          Assessment/Plan   Medicare Risks and Personalized Health Plan  CMS Preventative Services Quick Reference  Advance Directive Discussion  Immunizations Discussed/Encouraged (specific immunizations; Shingrix )    The above risks/problems have been discussed with the patient.  Pertinent information has been shared with the patient in the After Visit Summary.  Follow up plans and orders are seen below in the Assessment/Plan Section.    Diagnoses and all orders for this visit:    1. Medicare annual wellness visit, subsequent (Primary)    2. Hyperlipidemia, unspecified hyperlipidemia type  -     Comprehensive Metabolic Panel  -     CBC (No Diff)  -     TSH Rfx On Abnormal To Free T4  -     Lipid Panel  -     Urinalysis With Culture If Indicated -  -     High Sensitivity CRP; Future  -     Homocysteine; Future  -     Fibrinogen; Future  -     Sedimentation Rate    3. Elevated fasting glucose  -     Hemoglobin A1c    4. Elevated TSH  -     DHEA  -     Estradiol  -     Testosterone (Free & Total), LC / MS    5. Encounter for Hemoccult screening  -     POC FECAL OCCULT BLOOD BY IMMUNOASSAY    6. Prostate cancer screening  -     PSA Screen    7. Osteopenia of lumbar spine  -     Vitamin D 25 Hydroxy      Follow Up:  Return in about 6 months (around 8/12/2020) for Follow up lipids fasting.     An After Visit Summary and PPPS were given to the patient.             "

## 2020-02-17 ENCOUNTER — RESULTS ENCOUNTER (OUTPATIENT)
Dept: INTERNAL MEDICINE | Facility: CLINIC | Age: 70
End: 2020-02-17

## 2020-02-17 DIAGNOSIS — E78.5 HYPERLIPIDEMIA, UNSPECIFIED HYPERLIPIDEMIA TYPE: ICD-10-CM

## 2020-02-17 LAB
25(OH)D3+25(OH)D2 SERPL-MCNC: 46.9 NG/ML (ref 30–100)
ALBUMIN SERPL-MCNC: 4.4 G/DL (ref 3.5–5.2)
ALBUMIN/GLOB SERPL: 1.7 G/DL
ALP SERPL-CCNC: 54 U/L (ref 39–117)
ALT SERPL-CCNC: 21 U/L (ref 1–41)
APPEARANCE UR: CLEAR
AST SERPL-CCNC: 36 U/L (ref 1–40)
BACTERIA #/AREA URNS HPF: NORMAL /HPF
BILIRUB SERPL-MCNC: 0.6 MG/DL (ref 0.2–1.2)
BILIRUB UR QL STRIP: NEGATIVE
BUN SERPL-MCNC: 21 MG/DL (ref 8–23)
BUN/CREAT SERPL: 21.2 (ref 7–25)
CALCIUM SERPL-MCNC: 9.3 MG/DL (ref 8.6–10.5)
CHLORIDE SERPL-SCNC: 103 MMOL/L (ref 98–107)
CHOLEST SERPL-MCNC: 259 MG/DL (ref 0–200)
CO2 SERPL-SCNC: 27.3 MMOL/L (ref 22–29)
COLOR UR: YELLOW
CREAT SERPL-MCNC: 0.99 MG/DL (ref 0.76–1.27)
DHEA SERPL-MCNC: 69 NG/DL (ref 31–701)
EPI CELLS #/AREA URNS HPF: NORMAL /HPF (ref 0–10)
ERYTHROCYTE [DISTWIDTH] IN BLOOD BY AUTOMATED COUNT: 13.1 % (ref 12.3–15.4)
ERYTHROCYTE [SEDIMENTATION RATE] IN BLOOD BY WESTERGREN METHOD: 2 MM/HR (ref 0–20)
ESTRADIOL SERPL-MCNC: 30.1 PG/ML (ref 7.6–42.6)
GLOBULIN SER CALC-MCNC: 2.6 GM/DL
GLUCOSE SERPL-MCNC: 100 MG/DL (ref 65–99)
GLUCOSE UR QL: NEGATIVE
HBA1C MFR BLD: 5.9 % (ref 4.8–5.6)
HCT VFR BLD AUTO: 45.8 % (ref 37.5–51)
HDLC SERPL-MCNC: 81 MG/DL (ref 40–60)
HGB BLD-MCNC: 16 G/DL (ref 13–17.7)
HGB UR QL STRIP: NEGATIVE
KETONES UR QL STRIP: NEGATIVE
LDLC SERPL CALC-MCNC: 170 MG/DL (ref 0–100)
LEUKOCYTE ESTERASE UR QL STRIP: NEGATIVE
MCH RBC QN AUTO: 31.5 PG (ref 26.6–33)
MCHC RBC AUTO-ENTMCNC: 34.9 G/DL (ref 31.5–35.7)
MCV RBC AUTO: 90.2 FL (ref 79–97)
MICRO URNS: NORMAL
MICRO URNS: NORMAL
MUCOUS THREADS URNS QL MICRO: PRESENT /HPF
NITRITE UR QL STRIP: NEGATIVE
PH UR STRIP: 6.5 [PH] (ref 5–7.5)
PLATELET # BLD AUTO: 230 10*3/MM3 (ref 140–450)
POTASSIUM SERPL-SCNC: 4.9 MMOL/L (ref 3.5–5.2)
PROT SERPL-MCNC: 7 G/DL (ref 6–8.5)
PROT UR QL STRIP: NEGATIVE
PSA SERPL-MCNC: 1.77 NG/ML (ref 0–4)
RBC # BLD AUTO: 5.08 10*6/MM3 (ref 4.14–5.8)
RBC #/AREA URNS HPF: NORMAL /HPF (ref 0–2)
SODIUM SERPL-SCNC: 142 MMOL/L (ref 136–145)
SP GR UR: 1.02 (ref 1–1.03)
TESTOST FREE SERPL-MCNC: 7.2 PG/ML (ref 6.6–18.1)
TESTOST SERPL-MCNC: 728.9 NG/DL (ref 264–916)
TRIGL SERPL-MCNC: 41 MG/DL (ref 0–150)
TSH SERPL DL<=0.005 MIU/L-ACNC: 3.99 UIU/ML (ref 0.27–4.2)
URINALYSIS REFLEX: NORMAL
UROBILINOGEN UR STRIP-MCNC: 0.2 MG/DL (ref 0.2–1)
VLDLC SERPL CALC-MCNC: 8.2 MG/DL
WBC # BLD AUTO: 6.9 10*3/MM3 (ref 3.4–10.8)
WBC #/AREA URNS HPF: NORMAL /HPF (ref 0–5)

## 2020-02-19 ENCOUNTER — TELEPHONE (OUTPATIENT)
Dept: INTERNAL MEDICINE | Facility: CLINIC | Age: 70
End: 2020-02-19

## 2020-02-19 NOTE — TELEPHONE ENCOUNTER
Patient called in requesting lab results from Wednesday. Patient would also like to have the results mailed to him as well.      Please call to advise [phone]

## 2020-06-04 ENCOUNTER — TELEPHONE (OUTPATIENT)
Dept: NEUROSURGERY | Facility: CLINIC | Age: 70
End: 2020-06-04

## 2020-06-04 NOTE — TELEPHONE ENCOUNTER
PT CALLED:    LAST OFFICE VISIT: 5/31/17 WITH DR. VINCENT    LAST ASSESSMENT/PLAN: Medical Decision Making:    His mechanical back pain symptoms have improved and he is back to his baseline.  I told him to return to physical therapy to help set up a home exercise program focusing on core strengthening and flexibility.  We will arrange at.  We will keep it open-ended.  He will think about mixing up his exercise routine several is not constantly running all the time.  If he develops leg pain or severe worsening of his back pain, he can certainly return to the office.       PT HAS COMPLETED THE PT AS INSTRUCTED AND STILL DOES THE HOME EXERCISE AND IS NOW EXPIERENCING BACK PAIN RADIATING DOWN HIS LEGS. NO B/B LOSS.   PLEASE ADVISE ON SCHEDULING OR IF PT NEEDS NEW REFERRAL!    PT CONTACT# [phone]  BEST TIME TO CALL: ANYTIME

## 2020-06-04 NOTE — TELEPHONE ENCOUNTER
He is considered a new patient since he has not been seen in 2017,   You can schedule him as a NP with anyone of the extenders

## 2020-06-19 NOTE — PROGRESS NOTES
"Subjective   History of Present Illness: Branden Brady is a 70 y.o. male is here today as a self referral for back pain. Mr. Brady was last seen by Dr. Roland on 05/31/2017.     History of Present Illness  Today, Mr. Brady c/o chronic intermittent low back pain but over the last 6 months worsening low back pain and he began to have BLE- tingling discomfort in buttocks and posterior thighs. Stretching, standing (the wrong way) aggravates the pain. He is not overly bothered by walking/running. Bending forward is more bothersome than upright. He has some mild weakness in BLE. His legs feel heavy with prolonged walking. He does report 2 falls in last 2 years- tripped while running.  He denies having any issues with bowel or bladder control. He states that he runs or walks everyday. No fever or chills. No CA. He denies stretching before/after run/walk.     The following portions of the patient's history were reviewed and updated as appropriate: allergies, current medications, past family history, past medical history, past social history, past surgical history and problem list.    Review of Systems   Constitutional: Negative for chills and fever.   Respiratory: Negative for cough and shortness of breath.    Cardiovascular: Negative for chest pain, palpitations and leg swelling.   Gastrointestinal: Negative for abdominal pain and constipation.   Genitourinary: Negative for difficulty urinating and enuresis.   Musculoskeletal: Positive for back pain and gait problem.   Skin: Negative for rash.   Neurological: Positive for weakness. Negative for numbness (or tingling).   Hematological: Does not bruise/bleed easily.   Psychiatric/Behavioral: Negative for sleep disturbance.       Objective     Vitals:    06/24/20 1248   BP: 128/74   Pulse: 80   Temp: 98 °F (36.7 °C)   Weight: 97.3 kg (214 lb 6.4 oz)   Height: 72 cm (28.35\")     Body mass index is 187.6 kg/m².      Physical Exam   Constitutional: He appears " well-developed and well-nourished.   Pulmonary/Chest: Effort normal.   Musculoskeletal:        Lumbar back: He exhibits normal range of motion, no tenderness, no bony tenderness and no pain ( Negative straight leg raise).   Negative Jose for SI joint pain or groin pain bilaterally   Neurological: He is alert. Gait normal.   Reflex Scores:       Patellar reflexes are 1+ on the right side and 1+ on the left side.       Achilles reflexes are 1+ on the right side and 1+ on the left side.  Vitals reviewed.    Neurologic Exam     Mental Status   Level of consciousness: alert  Knowledge: good.   Normal comprehension.     Motor Exam   5/5 bilateral lower extremities     Sensory Exam   Right leg light touch: normal  Left leg light touch: normal    Gait, Coordination, and Reflexes     Gait  Gait: normal    Reflexes   Right patellar: 1+  Left patellar: 1+  Right achilles: 1+  Left achilles: 1+  Able to heel and toe walk bilaterally       Assessment/Plan   Independent Review of Radiographic Studies:      I personally reviewed the images from the following studies.    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  He has trouble specifying exactly what it feels like, but he has symbiotic and posterior thigh discomfort that is particularly present with standing and certain positions and stretching certain positions.  He does not have any significant increased pain with walking running, but he does notice that his legs fatigue sooner than they used to.  His exam as noted above with no red flags.  Normal strength, sensation, gait.  No tenderness palpation percussion over the thoracolumbar spine.  Negative Jose and straight leg raise.  Due to his complaints of posterior thigh pain and sense of fatigue in legs with prolonged walking, we will go ahead and order MRI lumbar spine.  He will turn office  after.  Consider PT or injections.  Okay for NSAIDs which he states do seem to help some.    Plan: MRI lumbar spine.  Return office after    Branden was seen today for back pain.    Diagnoses and all orders for this visit:    Chronic bilateral low back pain with bilateral sciatica  -     MRI Lumbar Spine Without Contrast; Future      Return for with imaging, Follow-up with [facility].

## 2020-06-24 ENCOUNTER — OFFICE VISIT (OUTPATIENT)
Dept: NEUROSURGERY | Facility: CLINIC | Age: 70
End: 2020-06-24

## 2020-06-24 VITALS
DIASTOLIC BLOOD PRESSURE: 74 MMHG | HEART RATE: 80 BPM | TEMPERATURE: 98 F | WEIGHT: 214.4 LBS | HEIGHT: 60 IN | BODY MASS INDEX: 42.09 KG/M2 | SYSTOLIC BLOOD PRESSURE: 128 MMHG

## 2020-06-24 DIAGNOSIS — M54.41 CHRONIC BILATERAL LOW BACK PAIN WITH BILATERAL SCIATICA: Primary | ICD-10-CM

## 2020-06-24 DIAGNOSIS — G89.29 CHRONIC BILATERAL LOW BACK PAIN WITH BILATERAL SCIATICA: Primary | ICD-10-CM

## 2020-06-24 DIAGNOSIS — M54.42 CHRONIC BILATERAL LOW BACK PAIN WITH BILATERAL SCIATICA: Primary | ICD-10-CM

## 2020-06-24 PROCEDURE — 99203 OFFICE O/P NEW LOW 30 MIN: CPT | Performed by: NURSE PRACTITIONER

## 2020-07-06 ENCOUNTER — HOSPITAL ENCOUNTER (OUTPATIENT)
Dept: MRI IMAGING | Facility: HOSPITAL | Age: 70
Discharge: HOME | End: 2020-07-06
Admitting: NURSE PRACTITIONER

## 2020-07-06 DIAGNOSIS — G89.29 CHRONIC BILATERAL LOW BACK PAIN WITH BILATERAL SCIATICA: ICD-10-CM

## 2020-07-06 DIAGNOSIS — M54.41 CHRONIC BILATERAL LOW BACK PAIN WITH BILATERAL SCIATICA: ICD-10-CM

## 2020-07-06 DIAGNOSIS — M54.42 CHRONIC BILATERAL LOW BACK PAIN WITH BILATERAL SCIATICA: ICD-10-CM

## 2020-07-06 PROCEDURE — 72148 MRI LUMBAR SPINE W/O DYE: CPT

## 2020-07-08 ENCOUNTER — TELEPHONE (OUTPATIENT)
Dept: NEUROSURGERY | Facility: CLINIC | Age: 70
End: 2020-07-08

## 2020-07-08 NOTE — TELEPHONE ENCOUNTER
Patient's appointment was delayed due to scheduling issues.  He does have quite significant lumbar stenosis and with this I wanted him to be see sooner if possible.  Advised the patient to Dr. Roland had availability tomorrow and I felt it best for him to speak to him with regard to neck step options which may include can conservative measures with injections or therapy versus surgical discussion.  Patient agreeable to this visit and will keep appointment for tomorrow with Dr. Roland.

## 2020-07-08 NOTE — TELEPHONE ENCOUNTER
Pt called requesting that Maggie calls him sometime today. Pt states that he has some questions he needs ask her. Please call and advise.

## 2020-07-08 NOTE — PROGRESS NOTES
Subjective   History of Present Illness: Branden Brady is a 70 y.o. male is here today for follow-up to discuss lumbar MRI results done at [facility] 7.6.20.  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 He is still active, but he began having early leg symptoms consistent with neurogenic claudication about a year ago. I saw him about 3 years ago and at the time he had mainly back pain which is still manageable. The leg pain comes on in both legs when he stands or walks for a long period of time. Lying down seems to make it better; sitting is somewhat neutral. It has not really interfered with activities and he considers it still a nuisance, but he was concerned what it represented. I told him it represented a progression of the natural history of spinal stenosis involving sciatica and radiculopathy, but he has no motor deficits. He still has his back pain. He is active, but he does not really work on core strengthening. I suggested we try some physical therapy. I told him it may not help his radiculopathy, but it could certainly help reduce his back pain, and we will monitor his symptoms. I think it is probably premature to do any epidural blocks and we have discussed gabapentin, but he has been taking antiinflammatories and those seem to help reasonably well. I will see him in 6 months and he will let us know if he worsens and if we need to be more aggressive about treating the sciatica through blocks or gabapentin.       Back Pain   The problem occurs intermittently. The problem is unchanged. The pain is present in the lumbar spine and gluteal. The pain is at a severity of 6/10. The pain is moderate. The  "symptoms are aggravated by standing, sitting, twisting, position, lying down and bending. Associated symptoms include leg pain and numbness. Pertinent negatives include no fever or weakness.   Leg Pain    The pain is present in the left leg and right leg. The pain is at a severity of 7/10. The pain is moderate. The pain has been intermittent since onset. Associated symptoms include numbness.       The following portions of the patient's history were reviewed and updated as appropriate: allergies, current medications, past family history, past medical history, past social history, past surgical history and problem list.    Review of Systems   Constitutional: Negative.  Negative for fever.   Eyes: Negative.    Respiratory: Negative.    Cardiovascular: Negative.    Gastrointestinal: Negative.    Endocrine: Negative.    Genitourinary: Negative.    Musculoskeletal: Positive for back pain. Negative for arthralgias, gait problem, joint swelling and myalgias.   Allergic/Immunologic: Negative.    Neurological: Positive for numbness. Negative for weakness.   Hematological: Negative.    Psychiatric/Behavioral: Negative.        Objective     Vitals:    07/09/20 1135   BP: 137/78   Pulse: 74   Temp: 98.6 °F (37 °C)   TempSrc: Tympanic   Height: 72 cm (28.35\")     Body mass index is 187.6 kg/m².      Physical Exam   Constitutional: He is oriented to person, place, and time. He appears well-developed and well-nourished.   HENT:   Head: Normocephalic and atraumatic.   Eyes: Pupils are equal, round, and reactive to light. Conjunctivae and EOM are normal.   Fundoscopic exam:       The right eye shows no papilledema. The right eye shows venous pulsations.        The left eye shows no papilledema. The left eye shows venous pulsations.   Neck: Carotid bruit is not present.   Neurological: He is oriented to person, place, and time. He has a normal Finger-Nose-Finger Test and a normal Heel to Shin Test. Gait normal.   Reflex Scores:       " Tricep reflexes are 2+ on the right side and 2+ on the left side.       Bicep reflexes are 2+ on the right side and 2+ on the left side.       Brachioradialis reflexes are 2+ on the right side and 2+ on the left side.       Patellar reflexes are 2+ on the right side and 2+ on the left side.       Achilles reflexes are 2+ on the right side and 2+ on the left side.  Psychiatric: His speech is normal.     Neurologic Exam     Mental Status   Oriented to person, place, and time.   Registration of memory: Good recent and remote memory.   Attention: normal. Concentration: normal.   Speech: speech is normal   Level of consciousness: alert  Knowledge: consistent with education.     Cranial Nerves     CN II   Visual fields full to confrontation.   Visual acuity: normal    CN III, IV, VI   Pupils are equal, round, and reactive to light.  Extraocular motions are normal.     CN V   Facial sensation intact.   Right corneal reflex: normal  Left corneal reflex: normal    CN VII   Facial expression full, symmetric.   Right facial weakness: none  Left facial weakness: none    CN VIII   Hearing: intact    CN IX, X   Palate: symmetric    CN XI   Right sternocleidomastoid strength: normal  Left sternocleidomastoid strength: normal    CN XII   Tongue: not atrophic  Tongue deviation: none    Motor Exam   Muscle bulk: normal  Right arm tone: normal  Left arm tone: normal  Right leg tone: normal  Left leg tone: normal    Strength   Strength 5/5 except as noted.     Sensory Exam   Light touch normal.     Gait, Coordination, and Reflexes     Gait  Gait: normal    Coordination   Finger to nose coordination: normal  Heel to shin coordination: normal    Reflexes   Right brachioradialis: 2+  Left brachioradialis: 2+  Right biceps: 2+  Left biceps: 2+  Right triceps: 2+  Left triceps: 2+  Right patellar: 2+  Left patellar: 2+  Right achilles: 2+  Left achilles: 2+  Right : 2+  Left : 2+          Assessment/Plan   Independent Review of  Radiographic Studies:      I personally reviewed the images from the following studies.    The lumbar MRI done on 7/6/2020 was reviewed.  He does have severe stenosis at L3-L4 and to a lesser degree at L4-L5 L5-S1 L2L3 are fairly patent.  No significant spondylolisthesis.  Agree with the report.        Medical Decision Making:      We will try some physical therapy and he will monitor his leg symptoms.  The therapy is really to help him build his core muscles and perhaps reduce his neck pain, not necessarily with neurogenic claudication.  I will see him in 6 months.  But if things act up and we want to consider either a block or gabapentin he will let us know.      Branden was seen today for back pain, leg pain and numbness.    Diagnoses and all orders for this visit:    Chronic bilateral low back pain with bilateral sciatica  -     Ambulatory Referral to Physical Therapy Evaluate and treat    Spinal stenosis of lumbar region with neurogenic claudication  -     Ambulatory Referral to Physical Therapy Evaluate and treat      Return in about 6 months (around 1/9/2021).

## 2020-07-09 ENCOUNTER — OFFICE VISIT (OUTPATIENT)
Dept: NEUROSURGERY | Facility: CLINIC | Age: 70
End: 2020-07-09

## 2020-07-09 VITALS
SYSTOLIC BLOOD PRESSURE: 137 MMHG | TEMPERATURE: 98.6 F | DIASTOLIC BLOOD PRESSURE: 78 MMHG | HEIGHT: 60 IN | BODY MASS INDEX: 187.6 KG/M2 | HEART RATE: 74 BPM

## 2020-07-09 DIAGNOSIS — M54.41 CHRONIC BILATERAL LOW BACK PAIN WITH BILATERAL SCIATICA: Primary | ICD-10-CM

## 2020-07-09 DIAGNOSIS — M54.42 CHRONIC BILATERAL LOW BACK PAIN WITH BILATERAL SCIATICA: Primary | ICD-10-CM

## 2020-07-09 DIAGNOSIS — M48.062 SPINAL STENOSIS OF LUMBAR REGION WITH NEUROGENIC CLAUDICATION: ICD-10-CM

## 2020-07-09 DIAGNOSIS — G89.29 CHRONIC BILATERAL LOW BACK PAIN WITH BILATERAL SCIATICA: Primary | ICD-10-CM

## 2020-07-09 PROCEDURE — 99213 OFFICE O/P EST LOW 20 MIN: CPT | Performed by: NEUROLOGICAL SURGERY

## 2020-07-09 RX ORDER — IBUPROFEN 200 MG
200 TABLET ORAL EVERY 6 HOURS PRN
COMMUNITY
End: 2021-08-17

## 2020-08-13 ENCOUNTER — OFFICE VISIT (OUTPATIENT)
Dept: INTERNAL MEDICINE | Facility: CLINIC | Age: 70
End: 2020-08-13

## 2020-08-13 VITALS
HEART RATE: 62 BPM | HEIGHT: 72 IN | SYSTOLIC BLOOD PRESSURE: 122 MMHG | BODY MASS INDEX: 29.26 KG/M2 | DIASTOLIC BLOOD PRESSURE: 80 MMHG | OXYGEN SATURATION: 98 % | WEIGHT: 216 LBS

## 2020-08-13 DIAGNOSIS — R73.01 ELEVATED FASTING GLUCOSE: ICD-10-CM

## 2020-08-13 DIAGNOSIS — E78.5 HYPERLIPIDEMIA, UNSPECIFIED HYPERLIPIDEMIA TYPE: Primary | ICD-10-CM

## 2020-08-13 LAB
ALBUMIN SERPL-MCNC: 4.6 G/DL (ref 3.5–5.2)
ALBUMIN/GLOB SERPL: 2.6 G/DL
ALP SERPL-CCNC: 53 U/L (ref 39–117)
ALT SERPL-CCNC: 25 U/L (ref 1–41)
AST SERPL-CCNC: 37 U/L (ref 1–40)
BILIRUB SERPL-MCNC: 0.5 MG/DL (ref 0–1.2)
BUN SERPL-MCNC: 20 MG/DL (ref 8–23)
BUN/CREAT SERPL: 18.3 (ref 7–25)
CALCIUM SERPL-MCNC: 9.4 MG/DL (ref 8.6–10.5)
CHLORIDE SERPL-SCNC: 101 MMOL/L (ref 98–107)
CHOLEST SERPL-MCNC: 232 MG/DL (ref 0–200)
CHOLEST/HDLC SERPL: 3.14 {RATIO}
CO2 SERPL-SCNC: 28 MMOL/L (ref 22–29)
CREAT SERPL-MCNC: 1.09 MG/DL (ref 0.76–1.27)
GLOBULIN SER CALC-MCNC: 1.8 GM/DL
GLUCOSE SERPL-MCNC: 106 MG/DL (ref 65–99)
HBA1C MFR BLD: 5.6 % (ref 4.8–5.6)
HDLC SERPL-MCNC: 74 MG/DL (ref 40–60)
LDLC SERPL CALC-MCNC: 148 MG/DL (ref 0–100)
POTASSIUM SERPL-SCNC: 5 MMOL/L (ref 3.5–5.2)
PROT SERPL-MCNC: 6.4 G/DL (ref 6–8.5)
SODIUM SERPL-SCNC: 137 MMOL/L (ref 136–145)
TRIGL SERPL-MCNC: 48 MG/DL (ref 0–150)
VLDLC SERPL CALC-MCNC: 9.6 MG/DL

## 2020-08-13 PROCEDURE — 99213 OFFICE O/P EST LOW 20 MIN: CPT | Performed by: INTERNAL MEDICINE

## 2020-08-13 NOTE — PROGRESS NOTES
"Chief Complaint   Patient presents with   • Hyperlipidemia     6 month follow up       Subjective   Branden Brady is a 70 y.o. male.     History of Present Illness     Hyperlipidemia:    This is a chronic problem.  His most recent lipid panel showed:  Lab Results   Component Value Date    CHOL 254 (H) 02/05/2019    CHLPL 259 (H) 02/12/2020    TRIG 41 02/12/2020    HDL 81 (H) 02/12/2020     (H) 02/12/2020     Current treatment: lifestyle changes.    Elevated fasting glucose:  This is a chronic problem.  Patient has been advised to follow prudent diet, avoid sugar, exercise regularly.  He denies polyuria, polydipsia, vision change appetite change, unexplained weight loss.   Lab Results   Component Value Date    HGBA1C 5.90 (H) 02/12/2020        The following portions of the patient's history were reviewed and updated as appropriate: allergies, current medications, past family history, past medical history, past social history, past surgical history and problem list.    Review of Systems   Constitutional: Negative for appetite change.   HENT: Negative for nosebleeds.    Eyes: Negative for blurred vision and double vision.   Respiratory: Negative for cough and shortness of breath.    Cardiovascular: Negative for chest pain, palpitations and leg swelling.   Endocrine: Negative for polydipsia and polyuria.         Current Outpatient Medications:   •  ibuprofen (ADVIL,MOTRIN) 200 MG tablet, Take 200 mg by mouth Every 6 (Six) Hours As Needed for Mild Pain ., Disp: , Rfl:   •  Multiple Vitamins-Minerals (MULTIVITAMIN ADULT PO), Take  by mouth Daily., Disp: , Rfl:   •  tretinoin (RETIN-A) 0.05 % cream, Every Night., Disp: , Rfl: 5  •  Triamcinolone Acetonide (NASACORT) 55 MCG/ACT nasal inhaler, 2 sprays into the nostril(s) as directed by provider Daily., Disp: , Rfl:         Objective     /80 (BP Location: Left arm, Patient Position: Sitting, Cuff Size: Adult)   Pulse 62   Ht 182.9 cm (72\")   Wt 98 kg (216 " lb)   SpO2 98%   BMI 29.29 kg/m²     Physical Exam   Constitutional: He is oriented to person, place, and time. He appears well-developed and well-nourished. No distress.   Neck: Normal carotid pulses present. Carotid bruit is not present.   Cardiovascular: Regular rhythm, S1 normal and S2 normal. Exam reveals no gallop and no friction rub.   No murmur heard.  Pulses:       Carotid pulses are 2+ on the right side, and 2+ on the left side.  Pulmonary/Chest: Effort normal and breath sounds normal. He has no wheezes. He has no rhonchi. He has no rales. Chest wall is not dull to percussion.   Musculoskeletal: He exhibits no edema.   Neurological: He is alert and oriented to person, place, and time.   Skin: Skin is warm and dry.   Nursing note and vitals reviewed.        Assessment/Plan   Branden was seen today for hyperlipidemia.    Diagnoses and all orders for this visit:    Hyperlipidemia, unspecified hyperlipidemia type  -     Comprehensive Metabolic Panel  -     Lipid Panel With / Chol / HDL Ratio    Elevated fasting glucose  -     Hemoglobin A1c

## 2020-08-13 NOTE — PATIENT INSTRUCTIONS
Preventing High Cholesterol  Cholesterol is a white, waxy substance similar to fat that the human body needs to help build cells. The liver makes all the cholesterol that a person's body needs. Having high cholesterol (hypercholesterolemia) increases a person's risk for heart disease and stroke. Extra (excess) cholesterol comes from the food the person eats.  High cholesterol can often be prevented with diet and lifestyle changes. If you already have high cholesterol, you can control it with diet and lifestyle changes and with medicine.  How can high cholesterol affect me?  If you have high cholesterol, deposits (plaques) may build up on the walls of your arteries. The arteries are the blood vessels that carry blood away from your heart.  Plaques make the arteries narrower and stiffer. This can limit or block blood flow and cause blood clots to form. Blood clots:  · Are tiny balls of cells that form in your blood.  · Can move to the heart or brain, causing a heart attack or stroke.  Plaques in arteries greatly increase your risk for heart attack and stroke.Making diet and lifestyle changes can reduce your risk for these conditions that may threaten your life.  What can increase my risk?  This condition is more likely to develop in people who:  · Eat foods that are high in saturated fat or cholesterol. Saturated fat is mostly found in:  ? Foods that contain animal fat, such as red meat and some dairy products.  ? Certain fatty foods made from plants, such as tropical oils.  · Are overweight.  · Are not getting enough exercise.  · Have a family history of high cholesterol.  What actions can I take to prevent this?  Nutrition    · Eat less saturated fat.  · Avoid trans fats (partially hydrogenated oils). These are often found in margarine and in some baked goods, fried foods, and snacks bought in packages.  · Avoid precooked or cured meat, such as sausages or meat loaves.  · Avoid foods and drinks that have added  sugars.  · Eat more fruits, vegetables, and whole grains.  · Choose healthy sources of protein, such as fish, poultry, lean cuts of red meat, beans, peas, lentils, and nuts.  · Choose healthy sources of fat, such as:  ? Nuts.  ? Vegetable oils, especially olive oil.  ? Fish that have healthy fats (omega-3 fatty acids), such as mackerel or salmon.  The items listed above may not be a complete list of recommended foods and beverages. Contact a dietitian for more information.  Lifestyle  · Lose weight if you are overweight. Losing 5-10 lb (2.3-4.5 kg) can help prevent or control high cholesterol. It can also lower your risk for diabetes and high blood pressure. Ask your health care provider to help you with a diet and exercise plan to lose weight safely.  · Do not use any products that contain nicotine or tobacco, such as cigarettes, e-cigarettes, and chewing tobacco. If you need help quitting, ask your health care provider.  · Limit your alcohol intake.  ? Do not drink alcohol if:  § Your health care provider tells you not to drink.  § You are pregnant, may be pregnant, or are planning to become pregnant.  ? If you drink alcohol:  § Limit how much you use to:  § 0-1 drink a day for women.  § 0-2 drinks a day for men.  § Be aware of how much alcohol is in your drink. In the [place] one drink equals one 12 oz bottle of beer (355 mL), one 5 oz glass of wine (148 mL), or one 1½ oz glass of hard liquor (44 mL).  Activity    · Get enough exercise. Each week, do at least 150 minutes of exercise that takes a medium level of effort (moderate-intensity exercise).  ? This is exercise that:  § Makes your heart beat faster and makes you breathe harder than usual.  § Allows you to still be able to talk.  ? You could exercise in short sessions several times a day or longer sessions a few times a week. For example, on 5 days each week, you could walk fast or ride your bike 3 times a day for 10 minutes each time.  · Do exercises as told  by your health care provider.  Medicines  · In addition to diet and lifestyle changes, your health care provider may recommend medicines to help lower cholesterol. This may be a medicine to lower the amount of cholesterol your liver makes. You may need medicine if:  ? Diet and lifestyle changes do not lower your cholesterol enough.  ? You have high cholesterol and other risk factors for heart disease or stroke.  · Take over-the-counter and prescription medicines only as told by your health care provider.  General information  · Manage your risk factors for high cholesterol. Talk with your health care provider about all your risk factors and how to lower your risk.  · Manage other conditions that you have, such as diabetes or high blood pressure (hypertension).  · Have blood tests to check your cholesterol levels at regular points in time as told by your health care provider.  · Keep all follow-up visits as told by your health care provider. This is important.  Where to find more information  · American Heart Association: www.heart.org  · National Heart, Lung, and Blood [place]: www.nhlbi.nih.gov  Summary  · High cholesterol increases your risk for heart disease and stroke. By keeping your cholesterol level low, you can reduce your risk for these conditions.  · High cholesterol can often be prevented with diet and lifestyle changes.  · Work with your health care provider to manage your risk factors, and have your blood tested regularly.  This information is not intended to replace advice given to you by your health care provider. Make sure you discuss any questions you have with your health care provider.  Document Released: 01/01/2017 Document Revised: 04/10/2020 Document Reviewed: 08/26/2017  [employer] Patient Education © 2020 [employer]

## 2020-12-28 ENCOUNTER — TELEPHONE (OUTPATIENT)
Dept: NEUROSURGERY | Facility: CLINIC | Age: 70
End: 2020-12-28

## 2021-01-04 NOTE — TELEPHONE ENCOUNTER
Caller: Branden Brady    Relationship to patient: Self    Best call back number: [phone]    Chief complaint: BACK PAIN NUMBNESS AND TINGLING GOING INTO BOTH LEGS.    Type of visit: FOLLOW UP    Requested date: ASAP     If rescheduling, when is the original appointment: 01/13/21  Additional notes:PT DOES NOT WANT TO DO AN EPIDURAL BLOCK AT THIS TIME.  HE WOULD LIKE TO COME IN AND SEE DR CARLTON DURAN.

## 2021-01-06 NOTE — PROGRESS NOTES
Subjective   Patient ID: Branden Brady is a 70 y.o. male is here today for follow-up.      Patient was last seen on 7.9.20.  He is being seen for low back pain.   Patient states that the pain radiates into both legs intermittently.He does experience numbness and tingling on occasion.    Patient takes ibuprofen 200 mg for pain.      He was referred to Physical Therapy.    I saw this gentleman about 6 months ago at which time he had mainly low back pain, very little leg pain.  He ended up not going to physical therapy because of his concerns about COVID.  About 5 weeks ago he was doing some running which he likes to do and began having some severe radiating right buttock and leg pain.  He stopped running and is now just walking and the pain has subsided quite a bit.  It is not gone but he certainly has remained functional.  He still works as a pharmacist.  We discussed with him that his stenosis at L3-4 was the primary reason for him to have radiating right buttock and leg pain and this frankly is not a surprising part of the natural history of this problem.  It has settled down quite a bit.  I offered to try a steroid pack but he does not want to do that.  It is not hurting enough to require an epidural block.  I suggested that we just watch things for now and do a TeleVisit in about 8 weeks.  If it continues to get better then perhaps he can re-introduce activities such as the running but for now I told him to stay away from that and perhaps try other things like stationary bicycling or swimming.  I again will do a TeleVisit in 2 months and he will let us know if things get worse.           Back Pain  The quality of the pain is described as aching. The pain radiates to the right thigh. The pain is at a severity of 3/10. The pain is worse during the day. The symptoms are aggravated by twisting, bending and position. Associated symptoms include leg pain, numbness, tingling and weakness. Pertinent negatives include  "no bladder incontinence, bowel incontinence or fever. He has tried walking for the symptoms. The treatment provided moderate relief.       The following portions of the patient's history were reviewed and updated as appropriate: allergies, current medications, past family history, past medical history, past social history, past surgical history and problem list.    Review of Systems   Constitutional: Negative for chills and fever.   HENT: Negative for congestion.    Gastrointestinal: Negative for bowel incontinence.   Genitourinary: Negative for bladder incontinence.   Musculoskeletal: Positive for back pain and gait problem.   Neurological: Positive for tingling, weakness and numbness. Negative for dizziness and light-headedness.   All other systems reviewed and are negative.          Objective     Vitals:    01/11/21 1038   BP: 145/85   Pulse: (!) 46   Temp: 98.1 °F (36.7 °C)   Weight: 99.8 kg (220 lb)   Height: 182.9 cm (72\")     Body mass index is 29.84 kg/m².      Physical Exam  Constitutional:       Appearance: He is well-developed.   HENT:      Head: Normocephalic and atraumatic.   Eyes:      Extraocular Movements: EOM normal.      Conjunctiva/sclera: Conjunctivae normal.      Pupils: Pupils are equal, round, and reactive to light.   Neck:      Vascular: No carotid bruit.   Neurological:      Mental Status: He is oriented to person, place, and time.      Coordination: Finger-Nose-Finger Test and Heel to Shin Test normal.      Gait: Gait is intact.      Deep Tendon Reflexes:      Reflex Scores:       Tricep reflexes are 2+ on the right side and 2+ on the left side.       Bicep reflexes are 2+ on the right side and 2+ on the left side.       Brachioradialis reflexes are 2+ on the right side and 2+ on the left side.       Patellar reflexes are 2+ on the right side and 2+ on the left side.       Achilles reflexes are 2+ on the right side and 2+ on the left side.  Psychiatric:         Speech: Speech normal. "       Neurologic Exam     Mental Status   Oriented to person, place, and time.   Registration of memory: Good recent and remote memory.   Attention: normal. Concentration: normal.   Speech: speech is normal   Level of consciousness: alert  Knowledge: consistent with education.     Cranial Nerves     CN II   Visual fields full to confrontation.   Visual acuity: normal    CN III, IV, VI   Pupils are equal, round, and reactive to light.  Extraocular motions are normal.     CN V   Facial sensation intact.   Right corneal reflex: normal  Left corneal reflex: normal    CN VII   Facial expression full, symmetric.   Right facial weakness: none  Left facial weakness: none    CN VIII   Hearing: intact    CN IX, X   Palate: symmetric    CN XI   Right sternocleidomastoid strength: normal  Left sternocleidomastoid strength: normal    CN XII   Tongue: not atrophic  Tongue deviation: none    Motor Exam   Muscle bulk: normal  Right arm tone: normal  Left arm tone: normal  Right leg tone: normal  Left leg tone: normal    Strength   Strength 5/5 except as noted.     Sensory Exam   Light touch normal.     Gait, Coordination, and Reflexes     Gait  Gait: normal    Coordination   Finger to nose coordination: normal  Heel to shin coordination: normal    Reflexes   Right brachioradialis: 2+  Left brachioradialis: 2+  Right biceps: 2+  Left biceps: 2+  Right triceps: 2+  Left triceps: 2+  Right patellar: 2+  Left patellar: 2+  Right achilles: 2+  Left achilles: 2+  Right : 2+  Left : 2+          Assessment/Plan   Independent Review of Radiographic Studies:      I personally reviewed the images from the following studies.    The lumbar MRI done on 7/6/2020 was reviewed.  He does have severe stenosis at L3-L4 and to a lesser degree at L4-L5 L5-S1 L2L3 are fairly patent.  No significant spondylolisthesis.  Agree with the report.    Medical Decision Making:      The right-sided radiculopathy has settled down quite a bit.  Will sit  tight for now and will do a televisit in about 2 months.  If it subsides completely then he can gradually reintroduce for now until we touch base.  If things worsen we can consider an epidural block.      Diagnoses and all orders for this visit:    1. Chronic bilateral low back pain with bilateral sciatica (Primary)    2. Spinal stenosis of lumbar region with neurogenic claudication      Return in about 2 months (around 3/11/2021) for As a televisit with me.

## 2021-01-11 ENCOUNTER — OFFICE VISIT (OUTPATIENT)
Dept: NEUROSURGERY | Facility: CLINIC | Age: 71
End: 2021-01-11

## 2021-01-11 VITALS
WEIGHT: 220 LBS | BODY MASS INDEX: 29.8 KG/M2 | SYSTOLIC BLOOD PRESSURE: 145 MMHG | DIASTOLIC BLOOD PRESSURE: 85 MMHG | TEMPERATURE: 98.1 F | HEART RATE: 46 BPM | HEIGHT: 72 IN

## 2021-01-11 DIAGNOSIS — M48.062 SPINAL STENOSIS OF LUMBAR REGION WITH NEUROGENIC CLAUDICATION: ICD-10-CM

## 2021-01-11 DIAGNOSIS — M54.41 CHRONIC BILATERAL LOW BACK PAIN WITH BILATERAL SCIATICA: Primary | ICD-10-CM

## 2021-01-11 DIAGNOSIS — M54.42 CHRONIC BILATERAL LOW BACK PAIN WITH BILATERAL SCIATICA: Primary | ICD-10-CM

## 2021-01-11 DIAGNOSIS — G89.29 CHRONIC BILATERAL LOW BACK PAIN WITH BILATERAL SCIATICA: Primary | ICD-10-CM

## 2021-01-11 PROCEDURE — 99214 OFFICE O/P EST MOD 30 MIN: CPT | Performed by: NEUROLOGICAL SURGERY

## 2021-02-18 ENCOUNTER — OFFICE VISIT (OUTPATIENT)
Dept: INTERNAL MEDICINE | Facility: CLINIC | Age: 71
End: 2021-02-18

## 2021-02-18 VITALS — TEMPERATURE: 98 F | WEIGHT: 221 LBS | BODY MASS INDEX: 29.97 KG/M2 | HEART RATE: 89 BPM | OXYGEN SATURATION: 97 %

## 2021-02-18 DIAGNOSIS — Z12.5 PROSTATE CANCER SCREENING: ICD-10-CM

## 2021-02-18 DIAGNOSIS — R73.01 ELEVATED FASTING GLUCOSE: ICD-10-CM

## 2021-02-18 DIAGNOSIS — Z12.11 ENCOUNTER FOR HEMOCCULT SCREENING: ICD-10-CM

## 2021-02-18 DIAGNOSIS — R79.89 ELEVATED TSH: ICD-10-CM

## 2021-02-18 DIAGNOSIS — E78.5 HYPERLIPIDEMIA, UNSPECIFIED HYPERLIPIDEMIA TYPE: ICD-10-CM

## 2021-02-18 DIAGNOSIS — M85.88 OSTEOPENIA OF LUMBAR SPINE: ICD-10-CM

## 2021-02-18 DIAGNOSIS — Z00.00 MEDICARE ANNUAL WELLNESS VISIT, SUBSEQUENT: Primary | ICD-10-CM

## 2021-02-18 LAB
ERYTHROCYTE [SEDIMENTATION RATE] IN BLOOD: 0 MM/HR (ref 0–20)
HEMOCCULT STL QL IA: NEGATIVE

## 2021-02-18 PROCEDURE — 36415 COLL VENOUS BLD VENIPUNCTURE: CPT | Performed by: INTERNAL MEDICINE

## 2021-02-18 PROCEDURE — 82274 ASSAY TEST FOR BLOOD FECAL: CPT | Performed by: INTERNAL MEDICINE

## 2021-02-18 PROCEDURE — G0439 PPPS, SUBSEQ VISIT: HCPCS | Performed by: INTERNAL MEDICINE

## 2021-02-18 PROCEDURE — 85651 RBC SED RATE NONAUTOMATED: CPT | Performed by: INTERNAL MEDICINE

## 2021-02-18 NOTE — PATIENT INSTRUCTIONS
Medicare Wellness  Personal Prevention Plan of Service     Date of Office Visit:  2021  Encounter Provider:  Dariana Dalton MD  Place of Service:  [facility]  Patient Name: Branden Brady  :  1950    As part of the Medicare Wellness portion of your visit today, we are providing you with this personalized preventive plan of services (PPPS). This plan is based upon recommendations of the [place] Preventive Services Task Force (USPSTF) and the Advisory Committee on Immunization Practices (ACIP).    This lists the preventive care services that should be considered, and provides dates of when you are due. Items listed as completed are up-to-date and do not require any further intervention.    Health Maintenance   Topic Date Due   • ZOSTER VACCINE (2 of 3) 2016   • COLONOSCOPY  2020   • TDAP/TD VACCINES (2 - Td) 2021   • ANNUAL WELLNESS VISIT  2021   • LIPID PANEL  2021   • HEPATITIS C SCREENING  Completed   • INFLUENZA VACCINE  Completed   • Pneumococcal Vaccine 65+  Completed   • MENINGOCOCCAL VACCINE  Aged Out       Orders Placed This Encounter   Procedures   • CT Cardiac Calcium Score Without Dye     Standing Status:   Future     Standing Expiration Date:   2022   • DEXA Bone Density Axial     Standing Status:   Future     Standing Expiration Date:   2022     Order Specific Question:   Reason for Exam:     Answer:   screening for osteoporosis, history of osteopenia lumbar spine   • Comprehensive Metabolic Panel   • Lipid Panel With / Chol / HDL Ratio   • CBC (No Diff)   • Urinalysis With Microscopic If Indicated (No Culture) - Urine, Clean Catch   • TSH Rfx On Abnormal To Free T4   • PSA Screen   • Hemoglobin A1c   • High Sensitivity CRP   • Homocysteine     Standing Status:   Future     Number of Occurrences:   1     Standing Expiration Date:   2022   • Fibrinogen   • Testosterone (Free & Total), LC / MS   • Estradiol    • DHEA   • Vitamin D 25 Hydroxy   • Sedimentation Rate   • POC FECAL OCCULT BLOOD BY IMMUNOASSAY       Return in about 6 months (around 8/18/2021) for 30 (THIRTY) min follow up, Fasting.    Please send us a copy of your living will/advanced directive     Check with your pharmacy about the new zoster (shingles) vaccine and a tetanus shot (Td).

## 2021-02-18 NOTE — PROGRESS NOTES
The ABCs of the Annual Wellness Visit  Subsequent Medicare Wellness Visit    Chief Complaint   Patient presents with   • Medicare Wellness-subsequent       Subjective   History of Present Illness:  Branden Brady is a 70 y.o. male who presents for a Subsequent Medicare Wellness Visit.    HEALTH RISK ASSESSMENT    Recent Hospitalizations:  No hospitalization(s) within the last year.    Current Medical Providers:  Patient Care Team:  Dariana Dalton MD as PCP - General  Dariana Dalton MD as PCP - Family Medicine    Smoking Status:  Social History     Tobacco Use   Smoking Status Never Smoker   Smokeless Tobacco Never Used       Alcohol Consumption:  Social History     Substance and Sexual Activity   Alcohol Use Yes    Comment: Socially        Depression Screen:   PHQ-2/PHQ-9 Depression Screening 2/18/2021   Little interest or pleasure in doing things 0   Feeling down, depressed, or hopeless 0   Trouble falling or staying asleep, or sleeping too much 0   Feeling tired or having little energy 0   Poor appetite or overeating 0   Feeling bad about yourself - or that you are a failure or have let yourself or your family down 0   Trouble concentrating on things, such as reading the newspaper or watching television 0   Moving or speaking so slowly that other people could have noticed. Or the opposite - being so fidgety or restless that you have been moving around a lot more than usual 0   Thoughts that you would be better off dead, or of hurting yourself in some way 0   Total Score 0       Fall Risk Screen:  STEADI Fall Risk Assessment was completed, and patient is at LOW risk for falls.Assessment completed on:2/18/2021    Health Habits and Functional and Cognitive Screening:  Functional & Cognitive Status 2/18/2021   Do you have difficulty preparing food and eating? No   Do you have difficulty bathing yourself, getting dressed or grooming yourself? No   Do you have difficulty using the toilet? No   Do you have difficulty  moving around from place to place? No   Do you have trouble with steps or getting out of a bed or a chair? No   Current Diet Well Balanced Diet   Dental Exam Up to date   Eye Exam Up to date   Exercise (times per week) 7 times per week   Current Exercise Activities Include Walking   Do you need help using the phone?  No   Are you deaf or do you have serious difficulty hearing?  No   Do you need help with transportation? No   Do you need help shopping? No   Do you need help preparing meals?  No   Do you need help with housework?  No   Do you need help with laundry? No   Do you need help taking your medications? No   Do you need help managing money? No   Do you ever drive or ride in a car without wearing a seat belt? No   Have you felt unusual stress, anger or loneliness in the last month? No   Who do you live with? Spouse   If you need help, do you have trouble finding someone available to you? No   Have you been bothered in the last four weeks by sexual problems? No   Do you have difficulty concentrating, remembering or making decisions? No         Does the patient have evidence of cognitive impairment? No    Asprin use counseling:Does not need ASA (and currently is not on it)    Age-appropriate Screening Schedule:  Refer to the list below for future screening recommendations based on patient's age, sex and/or medical conditions. Orders for these recommended tests are listed in the plan section. The patient has been provided with a written plan.    Health Maintenance   Topic Date Due   • ZOSTER VACCINE (2 of 3) 02/29/2016   • COLONOSCOPY  08/14/2020   • TDAP/TD VACCINES (2 - Td) 02/01/2021   • LIPID PANEL  08/13/2021   • INFLUENZA VACCINE  Completed          The following portions of the patient's history were reviewed and updated as appropriate: allergies, current medications, past family history, past medical history, past social history, past surgical history and problem list.    Outpatient Medications Prior to  Visit   Medication Sig Dispense Refill   • ibuprofen (ADVIL,MOTRIN) 200 MG tablet Take 200 mg by mouth Every 6 (Six) Hours As Needed for Mild Pain .     • Multiple Vitamins-Minerals (MULTIVITAMIN ADULT PO) Take  by mouth Daily.     • tretinoin (RETIN-A) 0.05 % cream Every Night.  5     No facility-administered medications prior to visit.        Patient Active Problem List   Diagnosis   • Hyperlipidemia   • Chronic bilateral low back pain with bilateral sciatica   • DDD (degenerative disc disease), lumbar   • Colon cancer screening   • Elevated fasting glucose   • Olecranon bursitis of left elbow   • Spinal stenosis of lumbar region with neurogenic claudication       Advanced Care Planning:  ACP discussion was held with the patient during this visit. Patient has an advance directive (not in EMR), copy requested.    Review of Systems   Constitutional: Negative for appetite change, chills, fatigue and fever.   HENT: Negative for congestion, ear pain, sinus pressure, sneezing, sore throat, tinnitus, trouble swallowing and voice change.    Eyes: Negative for visual disturbance.   Respiratory: Negative for cough and shortness of breath.    Cardiovascular: Negative for chest pain, palpitations and leg swelling.   Gastrointestinal: Negative for abdominal pain, constipation, diarrhea, nausea and vomiting.   Endocrine: Negative for cold intolerance, heat intolerance, polydipsia and polyuria.   Genitourinary: Negative for dysuria and frequency.   Musculoskeletal: Positive for back pain (some). Negative for arthralgias and gait problem.   Skin: Negative for rash.   Neurological: Negative for dizziness, syncope and headaches.   Hematological: Negative for adenopathy. Does not bruise/bleed easily.   Psychiatric/Behavioral: Negative for decreased concentration, dysphoric mood and sleep disturbance. The patient is not nervous/anxious.        Compared to one year ago, the patient feels his physical health is the same.  Compared to  one year ago, the patient feels his mental health is the same.    Reviewed chart for potential of high risk medication in the elderly: yes  Reviewed chart for potential of harmful drug interactions in the elderly:yes    Objective         Vitals:    02/18/21 1003   Pulse: 89   Temp: 98 °F (36.7 °C)   TempSrc: Tympanic   SpO2: 97%   Weight: 100 kg (221 lb)   PainSc: 0-No pain       Body mass index is 29.97 kg/m².  Discussed the patient's BMI with him. The BMI is above average; BMI management plan is completed. Encouraged prudent diet and regular exercise.      Physical Exam  Vitals signs and nursing note reviewed.   Constitutional:       General: He is not in acute distress.     Appearance: Normal appearance. He is well-developed.   HENT:      Right Ear: Tympanic membrane and ear canal normal.      Left Ear: Tympanic membrane and ear canal normal.      Nose:      Right Sinus: No maxillary sinus tenderness or frontal sinus tenderness.      Left Sinus: No maxillary sinus tenderness or frontal sinus tenderness.   Eyes:      General: No scleral icterus.     Conjunctiva/sclera: Conjunctivae normal.      Pupils: Pupils are equal, round, and reactive to light.   Neck:      Musculoskeletal: Normal range of motion and neck supple.      Thyroid: No thyromegaly.      Vascular: Normal carotid pulses. No carotid bruit.      Trachea: No tracheal deviation.   Cardiovascular:      Rate and Rhythm: Regular rhythm.      Pulses:           Carotid pulses are 2+ on the right side and 2+ on the left side.     Heart sounds: S1 normal and S2 normal. No murmur. No friction rub. No gallop.    Pulmonary:      Effort: Pulmonary effort is normal.      Breath sounds: Normal breath sounds. No wheezing, rhonchi or rales.   Abdominal:      General: Bowel sounds are normal. There is no abdominal bruit.      Palpations: Abdomen is soft.      Tenderness: There is no abdominal tenderness. There is no guarding or rebound.      Hernia: No hernia is  present.   Genitourinary:     Scrotum/Testes: Normal.      Prostate: Normal.      Rectum: Normal. Guaiac result negative.   Lymphadenopathy:      Cervical: No cervical adenopathy.   Skin:     General: Skin is warm and dry.   Neurological:      Mental Status: He is alert and oriented to person, place, and time.      Cranial Nerves: No cranial nerve deficit.      Coordination: Coordination normal.   Psychiatric:         Mood and Affect: Mood normal.               Assessment/Plan   Medicare Risks and Personalized Health Plan  CMS Preventative Services Quick Reference  Advance Directive Discussion  Cardiovascular risk  Colon Cancer Screening  Immunizations Discussed/Encouraged (specific immunizations; Td and Shingrix )  Obesity/Overweight   Prostate Cancer Screening     The above risks/problems have been discussed with the patient.  Pertinent information has been shared with the patient in the After Visit Summary.  Follow up plans and orders are seen below in the Assessment/Plan Section.    Diagnoses and all orders for this visit:    1. Medicare annual wellness visit, subsequent (Primary)    2. Hyperlipidemia, unspecified hyperlipidemia type  -     Comprehensive Metabolic Panel  -     Lipid Panel With / Chol / HDL Ratio  -     CBC (No Diff)  -     Urinalysis With Microscopic If Indicated (No Culture) - Urine, Clean Catch  -     TSH Rfx On Abnormal To Free T4  -     High Sensitivity CRP  -     Homocysteine; Future  -     Fibrinogen  -     Sedimentation Rate  -     CT Cardiac Calcium Score Without Dye; Future  -     Homocysteine    3. Elevated fasting glucose  -     Hemoglobin A1c    4. Encounter for Hemoccult screening  -     POC FECAL OCCULT BLOOD BY IMMUNOASSAY    5. Prostate cancer screening  -     PSA Screen    6. Osteopenia of lumbar spine  -     Vitamin D 25 Hydroxy  -     DEXA Bone Density Axial; Future    7. Elevated TSH  -     Testosterone (Free & Total), LC / MS  -     Estradiol  -     DHEA      Follow  Up:  Return in about 6 months (around 8/18/2021) for 30 (THIRTY) min follow up, Fasting.     An After Visit Summary and PPPS were given to the patient.

## 2021-02-24 LAB
25(OH)D3+25(OH)D2 SERPL-MCNC: 44.5 NG/ML (ref 30–100)
ALBUMIN SERPL-MCNC: 4.2 G/DL (ref 3.5–5.2)
ALBUMIN/GLOB SERPL: 1.8 G/DL
ALP SERPL-CCNC: 53 U/L (ref 39–117)
ALT SERPL-CCNC: 27 U/L (ref 1–41)
APPEARANCE UR: CLEAR
AST SERPL-CCNC: 39 U/L (ref 1–40)
BILIRUB SERPL-MCNC: 0.6 MG/DL (ref 0–1.2)
BILIRUB UR QL STRIP: NEGATIVE
BUN SERPL-MCNC: 18 MG/DL (ref 8–23)
BUN/CREAT SERPL: 17.6 (ref 7–25)
CALCIUM SERPL-MCNC: 9.6 MG/DL (ref 8.6–10.5)
CHLORIDE SERPL-SCNC: 104 MMOL/L (ref 98–107)
CHOLEST SERPL-MCNC: 261 MG/DL (ref 0–200)
CHOLEST/HDLC SERPL: 3.3 {RATIO}
CO2 SERPL-SCNC: 28.3 MMOL/L (ref 22–29)
COLOR UR: YELLOW
CREAT SERPL-MCNC: 1.02 MG/DL (ref 0.76–1.27)
CRP SERPL HS-MCNC: 0.85 MG/L (ref 0–3)
DHEA SERPL-MCNC: 80 NG/DL (ref 31–701)
ERYTHROCYTE [DISTWIDTH] IN BLOOD BY AUTOMATED COUNT: 13.5 % (ref 12.3–15.4)
ESTRADIOL SERPL-MCNC: 23 PG/ML (ref 7.6–42.6)
FIBRINOGEN PPP-MCNC: 325 MG/DL (ref 219–464)
GLOBULIN SER CALC-MCNC: 2.4 GM/DL
GLUCOSE SERPL-MCNC: 105 MG/DL (ref 65–99)
GLUCOSE UR QL: NEGATIVE
HBA1C MFR BLD: 5.5 % (ref 4.8–5.6)
HCT VFR BLD AUTO: 48.9 % (ref 37.5–51)
HCYS SERPL-SCNC: 13.2 UMOL/L (ref 0–17.2)
HDLC SERPL-MCNC: 79 MG/DL (ref 40–60)
HGB BLD-MCNC: 16.6 G/DL (ref 13–17.7)
HGB UR QL STRIP: NEGATIVE
KETONES UR QL STRIP: NEGATIVE
LDLC SERPL CALC-MCNC: 175 MG/DL (ref 0–100)
LEUKOCYTE ESTERASE UR QL STRIP: NEGATIVE
MCH RBC QN AUTO: 31.4 PG (ref 26.6–33)
MCHC RBC AUTO-ENTMCNC: 33.9 G/DL (ref 31.5–35.7)
MCV RBC AUTO: 92.4 FL (ref 79–97)
NITRITE UR QL STRIP: NEGATIVE
PH UR STRIP: 6.5 [PH] (ref 5–8)
PLATELET # BLD AUTO: 251 10*3/MM3 (ref 140–450)
POTASSIUM SERPL-SCNC: 4.8 MMOL/L (ref 3.5–5.2)
PROT SERPL-MCNC: 6.6 G/DL (ref 6–8.5)
PROT UR QL STRIP: NEGATIVE
PSA SERPL-MCNC: 2.08 NG/ML (ref 0–4)
RBC # BLD AUTO: 5.29 10*6/MM3 (ref 4.14–5.8)
SODIUM SERPL-SCNC: 140 MMOL/L (ref 136–145)
SP GR UR: 1.02 (ref 1–1.03)
T4 FREE SERPL-MCNC: 1 NG/DL (ref 0.93–1.7)
TESTOST FREE SERPL-MCNC: 8.2 PG/ML (ref 6.6–18.1)
TESTOST SERPL-MCNC: 780.1 NG/DL (ref 264–916)
TRIGL SERPL-MCNC: 46 MG/DL (ref 0–150)
TSH SERPL DL<=0.005 MIU/L-ACNC: 4.62 UIU/ML (ref 0.27–4.2)
UROBILINOGEN UR STRIP-MCNC: NORMAL MG/DL
VLDLC SERPL CALC-MCNC: 7 MG/DL (ref 5–40)
WBC # BLD AUTO: 5.78 10*3/MM3 (ref 3.4–10.8)

## 2021-03-01 ENCOUNTER — OFFICE VISIT (OUTPATIENT)
Dept: NEUROSURGERY | Facility: CLINIC | Age: 71
End: 2021-03-01

## 2021-03-01 DIAGNOSIS — M48.062 SPINAL STENOSIS OF LUMBAR REGION WITH NEUROGENIC CLAUDICATION: ICD-10-CM

## 2021-03-01 DIAGNOSIS — G89.29 CHRONIC BILATERAL LOW BACK PAIN WITH BILATERAL SCIATICA: Primary | ICD-10-CM

## 2021-03-01 DIAGNOSIS — M54.42 CHRONIC BILATERAL LOW BACK PAIN WITH BILATERAL SCIATICA: Primary | ICD-10-CM

## 2021-03-01 DIAGNOSIS — M54.41 CHRONIC BILATERAL LOW BACK PAIN WITH BILATERAL SCIATICA: Primary | ICD-10-CM

## 2021-03-01 PROCEDURE — 99442 PR PHYS/QHP TELEPHONE EVALUATION 11-20 MIN: CPT | Performed by: NEUROLOGICAL SURGERY

## 2021-03-08 ENCOUNTER — HOSPITAL ENCOUNTER (OUTPATIENT)
Dept: GENERAL RADIOLOGY | Facility: HOSPITAL | Age: 71
Discharge: HOME | End: 2021-03-08
Admitting: INTERNAL MEDICINE

## 2021-03-08 ENCOUNTER — TELEPHONE (OUTPATIENT)
Dept: INTERNAL MEDICINE | Facility: CLINIC | Age: 71
End: 2021-03-08

## 2021-03-08 DIAGNOSIS — R07.89 CHEST WALL PAIN: Primary | ICD-10-CM

## 2021-03-08 DIAGNOSIS — R07.89 CHEST WALL PAIN: ICD-10-CM

## 2021-03-08 PROCEDURE — 71101 X-RAY EXAM UNILAT RIBS/CHEST: CPT

## 2021-03-08 NOTE — TELEPHONE ENCOUNTER
Right rib cage injury a week ago and wondering if he should get xray, has fractured ribs before and it feels similar to that

## 2021-03-08 NOTE — TELEPHONE ENCOUNTER
Caller: Branden Brady    Relationship to patient: Self    Best call back number: [phone] (H)    Patient is needing:Patient called in stating he would like to receive a callback from Dr. Dalton. Patient would not give any further details, and stated he would like the callback to come directly from her. Please advise.

## 2021-03-19 ENCOUNTER — BULK ORDERING (OUTPATIENT)
Dept: CASE MANAGEMENT | Facility: OTHER | Age: 71
End: 2021-03-19

## 2021-03-19 DIAGNOSIS — Z23 IMMUNIZATION DUE: ICD-10-CM

## 2021-04-15 ENCOUNTER — HOSPITAL ENCOUNTER (OUTPATIENT)
Dept: BONE DENSITY | Facility: HOSPITAL | Age: 71
Discharge: HOME | End: 2021-04-15
Admitting: INTERNAL MEDICINE

## 2021-04-15 ENCOUNTER — HOSPITAL ENCOUNTER (OUTPATIENT)
Dept: CT IMAGING | Facility: HOSPITAL | Age: 71
Discharge: HOME | End: 2021-04-15

## 2021-04-15 DIAGNOSIS — M85.88 OSTEOPENIA OF LUMBAR SPINE: ICD-10-CM

## 2021-04-15 DIAGNOSIS — E78.5 HYPERLIPIDEMIA, UNSPECIFIED HYPERLIPIDEMIA TYPE: ICD-10-CM

## 2021-04-15 PROCEDURE — 75571 CT HRT W/O DYE W/CA TEST: CPT

## 2021-04-15 PROCEDURE — 77080 DXA BONE DENSITY AXIAL: CPT

## 2021-04-19 ENCOUNTER — TELEPHONE (OUTPATIENT)
Dept: INTERNAL MEDICINE | Facility: CLINIC | Age: 71
End: 2021-04-19

## 2021-04-19 DIAGNOSIS — I72.9 ANEURYSMAL DILATATION (HCC): Primary | ICD-10-CM

## 2021-04-19 RX ORDER — ROSUVASTATIN CALCIUM 10 MG/1
10 TABLET, COATED ORAL NIGHTLY
Qty: 30 TABLET | Refills: 5 | Status: SHIPPED | OUTPATIENT
Start: 2021-04-19 | End: 2021-09-10 | Stop reason: SDUPTHER

## 2021-04-19 NOTE — TELEPHONE ENCOUNTER
Caller: Branden Brady    Relationship: Self    Best call back number: [phone]    What orders are you requesting (i.e. lab or imaging): CTA SCAN    In what timeframe would the patient need to come in: ASAP    Where will you receive your lab/imaging services: [facility]    Additional notes: PATIENT CALLED REQUESTING AN ORDER FOR A CTA SCAN. HE STATED HE IS FLEXIBLE ABOUT WHERE TO GO TO GET THE SCAN. PLEASE CALL TO ADVISE.

## 2021-04-19 NOTE — TELEPHONE ENCOUNTER
Duplicate message, patient called twice. Encounter has been sent to Dr Dalton for further evaluation. Please advise

## 2021-04-21 ENCOUNTER — TELEPHONE (OUTPATIENT)
Dept: INTERNAL MEDICINE | Facility: CLINIC | Age: 71
End: 2021-04-21

## 2021-04-21 ENCOUNTER — HOSPITAL ENCOUNTER (OUTPATIENT)
Dept: CT IMAGING | Facility: HOSPITAL | Age: 71
Discharge: HOME | End: 2021-04-21
Admitting: INTERNAL MEDICINE

## 2021-04-21 DIAGNOSIS — I72.9 ANEURYSMAL DILATATION (HCC): Primary | ICD-10-CM

## 2021-04-21 DIAGNOSIS — I72.9 ANEURYSMAL DILATATION (HCC): ICD-10-CM

## 2021-04-21 PROCEDURE — 0 IOPAMIDOL PER 1 ML: Performed by: INTERNAL MEDICINE

## 2021-04-21 PROCEDURE — 71275 CT ANGIOGRAPHY CHEST: CPT

## 2021-04-21 RX ADMIN — IOPAMIDOL 100 ML: 755 INJECTION, SOLUTION INTRAVENOUS at 15:13

## 2021-04-21 NOTE — TELEPHONE ENCOUNTER
Patient states he called Monday 4-19-21 and wants to be sure message is addressed today without wait, in chart there was response to his messages. He is requesting an urgency or stat order for him to have further testing. He is scheduled for 4-29-21 but he is concerned it is still to far out. I have informed him this is next week and I am not sure that it will be possible to get him in sooner. He is very insistent and states that he may have to go somewhere else if he cannot get in sooner.  Asking for the urgency or stat order to be put in. Please advise

## 2021-04-21 NOTE — TELEPHONE ENCOUNTER
I spoke with him.  He has already contacted [facility] and has been able to reschedule CTA for today

## 2021-04-29 ENCOUNTER — APPOINTMENT (OUTPATIENT)
Dept: CT IMAGING | Facility: HOSPITAL | Age: 71
End: 2021-04-29

## 2021-05-10 ENCOUNTER — APPOINTMENT (OUTPATIENT)
Dept: CT IMAGING | Facility: HOSPITAL | Age: 71
End: 2021-05-10

## 2021-05-20 ENCOUNTER — TELEPHONE (OUTPATIENT)
Dept: INTERNAL MEDICINE | Facility: CLINIC | Age: 71
End: 2021-05-20

## 2021-05-20 DIAGNOSIS — E78.5 HYPERLIPIDEMIA, UNSPECIFIED HYPERLIPIDEMIA TYPE: Primary | ICD-10-CM

## 2021-05-20 NOTE — TELEPHONE ENCOUNTER
Caller: Branden Brady    Relationship: Self    Best call back number: [phone]        What test was performed: BLOODWORK TO BE DONE IN THE FUTURE        Additional notes: PATIENT WANTS TO DISCUSS WHAT TYPE OF LABS HE NEEDS/WANTS TO DO.PLEASE CALL AND ADVISE

## 2021-05-20 NOTE — TELEPHONE ENCOUNTER
The prescription for rosuvastatin 5 mg was sent in on April 19.  The soonest I check blood work after starting a statin medication is 6 weeks.  I will put an order in his chart for blood work to be done June 1.  We can check his lipid panel and also kidney tests and liver tests.

## 2021-05-20 NOTE — TELEPHONE ENCOUNTER
He has seen a cardiologist with being advised to possibly increase his rosuvastatin. He wants to be sure his kidneys or liver aren't being effected in any way before increasing. Asking to speak to Dr Dalton and if possible get bloodwork

## 2021-07-01 ENCOUNTER — TREATMENT (OUTPATIENT)
Dept: PHYSICAL THERAPY | Facility: CLINIC | Age: 71
End: 2021-07-01

## 2021-07-01 DIAGNOSIS — M54.41 CHRONIC BILATERAL LOW BACK PAIN WITH BILATERAL SCIATICA: ICD-10-CM

## 2021-07-01 DIAGNOSIS — M48.062 SPINAL STENOSIS OF LUMBAR REGION WITH NEUROGENIC CLAUDICATION: ICD-10-CM

## 2021-07-01 DIAGNOSIS — G89.29 CHRONIC BILATERAL LOW BACK PAIN WITH BILATERAL SCIATICA: ICD-10-CM

## 2021-07-01 DIAGNOSIS — M54.42 CHRONIC BILATERAL LOW BACK PAIN WITH BILATERAL SCIATICA: ICD-10-CM

## 2021-07-01 PROCEDURE — 97110 THERAPEUTIC EXERCISES: CPT | Performed by: PHYSICAL THERAPIST

## 2021-07-01 PROCEDURE — 97162 PT EVAL MOD COMPLEX 30 MIN: CPT | Performed by: PHYSICAL THERAPIST

## 2021-07-02 NOTE — PROGRESS NOTES
Physical Therapy Initial Evaluation and Plan of Care      Patient: Branden Brady   : 1950  Diagnosis/ICD-10 Code:  No primary diagnosis found.  Referring practitioner: Panfilo Roland MD  Date of Initial Visit: 2021  Today's Date: 2021  Patient seen for 1 sessions           Subjective Evaluation    History of Present Illness  Mechanism of injury: Progressive hx of severe lumbar stenosis L3-5    Subjective comment: progressive hx of LBP with standing/ walking/ running tasks.  pt enjoys running.  Quality of life: good    Pain  Current pain ratin  At best pain ratin  At worst pain ratin  Quality: tight, throbbing, sharp, radiating, cramping, discomfort, pressure and squeezing  Aggravating factors: ambulation, stairs, lifting, movement, standing and repetitive movement  Progression: worsening    Patient Goals  Patient goals for therapy: increased strength, independence with ADLs/IADLs, improved balance, decreased pain, increased motion and return to sport/leisure activities             Objective          Palpation   Left   Tenderness of the lumbar paraspinals and quadratus lumborum.     Right Tenderness of the lumbar paraspinals and quadratus lumborum.     Neurological Testing     Sensation     Lumbar   Left   Intact: light touch    Right   Intact: light touch    Reflexes   Left   Patellar (L4): absent (0)  Achilles (S1): trace (1+)    Right   Patellar (L4): absent (0)  Achilles (S1): trace (1+)    Additional Neurological Details  (-) slump/ (-) ASLR (B)    Active Range of Motion   Cervical/Thoracic Spine     Thoracic   Flexion: WFL  Extension: 50 degrees     Lumbar   Flexion: WFL  Extension: 25 degrees     Strength/Myotome Testing     Left Hip   Planes of Motion   Extension: 3+  Abduction: 4-  Adduction: 4+    Right Hip   Planes of Motion   Flexion: 4-  Extension: 3+  Abduction: 4-  Adduction: 4+    Left Knee   Flexion: 4+  Extension: 4-    Right Knee   Flexion: 4+  Extension:  4-    Left Ankle/Foot   Plantar flexion: 4  Inversion: 4  Eversion: 4  Great toe flexion: 4  Great toe extension: 4    Right Ankle/Foot   Plantar flexion: 4  Inversion: 4  Eversion: 4  Great toe flexion: 4  Great toe extension: 4     General Comments     Lumbar Comments  Decreased tolerance with sit-stand transfer/ pain with standing > 20 mins/ 30 min walking tolerance. Unable to to progressively jog.        See Exercise, Manual, and Modality Logs for complete treatment.       Functional Outcome Score: 48% oswestry        Assessment & Plan     Assessment  Impairments: abnormal gait, abnormal or restricted ROM, impaired balance, impaired physical strength, pain with function and safety issue  Assessment details: Branden Brady is a 71 y.o. year-old male referred to physical therapy for progressive LBP . He presents with a evolving clinical presentation.  He has comorbidities progressive lumbar stenosis and no personal factors  that may affect his progress in the plan of care.  Pt demonstrate decreased lumbar AROM in extension/ (B) SB secondary to pain. Severe ttp (B) QL L3-5 paraspinals.  Pt denies n/t LE; pain is noted with sit-stand transfer/ prolonged ambulation > 20 mins. Decreased core trunk lumbar stabilization is noted 4-/5; all dermatomes/ myotomes are intact and symmetrical.  Signs and symptoms are consistent with physical therapy diagnosis of lumbar stenosis/ LBP  Prognosis: good  Functional Limitations: carrying objects, lifting, sleeping, walking, pulling, uncomfortable because of pain, moving in bed, standing, stooping and unable to perform repetitive tasks  Goals  Plan Goals: stg 6 wks    Pt to be educated/ independent with initial HEP    Decreased (B) QL ms guarding from severe to minimal to allow for increased ease with all transfer/ increased ease with dressing tasks    Improve lumbar AROM to WFL to allow for increased ease with all ADL's    ltg 12 wks    Increase core trunk lumbar stabilization  MMT to 4/5 to allow for 35 mins prolonged ambulation tolerance without evidence of acute LBP consistently.     Improve oswestry score from 48%         Plan  Therapy options: will be seen for skilled physical therapy services  Planned modality interventions: cryotherapy, electrical stimulation/Russian stimulation, TENS, ultrasound and thermotherapy (hydrocollator packs)  Planned therapy interventions: abdominal trunk stabilization, manual therapy, motor coordination training, neuromuscular re-education, balance/weight-bearing training, ADL retraining, flexibility, functional ROM exercises, gait training, home exercise program, joint mobilization, transfer training, therapeutic activities, strengthening, stretching and soft tissue mobilization  Frequency: 2x week  Duration in weeks: 12  Treatment plan discussed with: patient        Timed:  Manual Therapy:        mins  54978;  Therapeutic Exercise:    15     mins  59676;     Neuromuscular Stephy:        mins  03211;    Therapeutic Activity:          mins  04564;     Gait Training:           mins  84001;     Ultrasound:          mins  86750;    Electrical Stimulation:         mins  22579 ( );  Iontophoresis         mins 19113  Dry Needling        mins      Untimed:  Electrical Stimulation:         mins  82755 ( );  Mechanical Traction:         mins  76029;     Timed Treatment: 15     mins   Total Treatment:       35 mins    PT SIGNATURE: Flo Bernardo, PT   DATE TREATMENT INITIATED: 7/2/2021    Initial Certification  Certification Period: 9/30/2021  I certify that the therapy services are furnished while this patient is under my care.  The services outlined above are required by this patient, and will be reviewed every 90 days.     PHYSICIAN: Panfilo Roland MD      DATE:     Please sign and return via fax to [phone] Thank you, [facility].

## 2021-07-16 ENCOUNTER — OFFICE VISIT (OUTPATIENT)
Dept: NEUROSURGERY | Facility: CLINIC | Age: 71
End: 2021-07-16

## 2021-07-16 DIAGNOSIS — M48.062 SPINAL STENOSIS OF LUMBAR REGION WITH NEUROGENIC CLAUDICATION: ICD-10-CM

## 2021-07-16 DIAGNOSIS — G89.29 CHRONIC BILATERAL LOW BACK PAIN WITH BILATERAL SCIATICA: Primary | ICD-10-CM

## 2021-07-16 DIAGNOSIS — M54.42 CHRONIC BILATERAL LOW BACK PAIN WITH BILATERAL SCIATICA: Primary | ICD-10-CM

## 2021-07-16 DIAGNOSIS — M54.41 CHRONIC BILATERAL LOW BACK PAIN WITH BILATERAL SCIATICA: Primary | ICD-10-CM

## 2021-07-16 PROCEDURE — 99442 PR PHYS/QHP TELEPHONE EVALUATION 11-20 MIN: CPT | Performed by: NEUROLOGICAL SURGERY

## 2021-07-16 NOTE — PROGRESS NOTES
Subjective   Patient ID: Branden Brady is a 71 y.o. male is here today for follow-up. Patient was last seen 1/11/21 for Chronic bilateral low back pain with bilateral sciatica.    You have chosen to receive care through a telephone visit. Do you consent to use a telephone visit for your medical care today? Yes    Patient reports today for follow up and to discuss options    This was a televisit from the hospital lasting 12 minutes.  The patient was at home.  The therapy helped him a bit.  He is trying to modify his exercise routine to deemphasize standing and walking.  But he certainly functionally does not even take any medications that he does not feel that he is bad enough to require any injections.  I told him that epidural injections would be reasonable if the legs bother him more and an ablation would be reasonable if the back bother him more.  Right now it is more the back anyway but it is not debilitating.  We went over the natural history of this problem and that I expect there to be gradual worsening that he is certainly not doing too badly right now so we will keep it open-ended and he will let us know if things go south in the future.          History of Present Illness    The following portions of the patient's history were reviewed and updated as appropriate: allergies, current medications, past family history, past medical history, past social history, past surgical history and problem list.    Review of Systems        Objective     There were no vitals filed for this visit.  There is no height or weight on file to calculate BMI.      Physical Exam   Deferred  Neurologic Exam        Assessment/Plan   Independent Review of Radiographic Studies:      I personally reviewed the images from the following studies.  The lumbar MRI done on 7/6/2020 was reviewed.  He does have severe stenosis at L3-L4 and to a lesser degree at L4-L5 L5-S1 L2L3 are fairly patent.  No significant spondylolisthesis.  Agree  with the report.      Medical Decision Making:      At this point, things are manageable.  We will keep it open-ended.  If symptoms worsen in the back or the legs or both we will let us know when he can come back to be reevaluated.      Diagnoses and all orders for this visit:    1. Chronic bilateral low back pain with bilateral sciatica (Primary)    2. Spinal stenosis of lumbar region with neurogenic claudication      Return if symptoms worsen or fail to improve.

## 2021-08-05 ENCOUNTER — TREATMENT (OUTPATIENT)
Dept: PHYSICAL THERAPY | Facility: CLINIC | Age: 71
End: 2021-08-05

## 2021-08-05 DIAGNOSIS — G89.29 CHRONIC BILATERAL LOW BACK PAIN WITH BILATERAL SCIATICA: Primary | ICD-10-CM

## 2021-08-05 DIAGNOSIS — M48.062 SPINAL STENOSIS OF LUMBAR REGION WITH NEUROGENIC CLAUDICATION: ICD-10-CM

## 2021-08-05 DIAGNOSIS — M54.41 CHRONIC BILATERAL LOW BACK PAIN WITH BILATERAL SCIATICA: Primary | ICD-10-CM

## 2021-08-05 DIAGNOSIS — M54.42 CHRONIC BILATERAL LOW BACK PAIN WITH BILATERAL SCIATICA: Primary | ICD-10-CM

## 2021-08-05 PROCEDURE — 97110 THERAPEUTIC EXERCISES: CPT | Performed by: PHYSICAL THERAPIST

## 2021-08-06 NOTE — PROGRESS NOTES
30-Day / 10-Visit Progress Note         Patient: Branden Brady   : 1950  Diagnosis/ICD-10 Code:  No primary diagnosis found.  Referring practitioner: Panfilo Roland MD  Date of Initial Visit: No linked episodes  Today's Date: 2021  Patient seen for Visit count could not be calculated. Make sure you are using a visit which is associated with an episode. sessions      Subjective:     Clinical Progress: improved  Home Program Compliance: Yes  Treatment has included:  therapeutic exercise    Subjective Evaluation    History of Present Illness    Subjective comment: pain is much better. exercises are helping. need to get my back stronger.      Objective     See Exercise, Manual, and Modality Logs for complete treatment.         Assessment & Plan     Assessment  Impairments: abnormal gait, abnormal or restricted ROM, activity intolerance, impaired balance, impaired physical strength, pain with function and safety issue  Assessment details: Branden Brady is a 71 y.o. year-old male referred to physical therapy for progressive LBP . He presents with a evolving clinical presentation.  He has comorbidities progressive lumbar stenosis and no personal factors  that may affect his progress in the plan of care.  Pt has been seen in PT for 1 visit and notes decreased symptoms of constant as symptoms are more intermittent; pt mild  decreased lumbar AROM in extension/ (B) SB secondary to pain. mild ttp (B) QL L3-5 paraspinals.  Pt denies n/t LE; no pain is noted with sit-stand transfer/ prolonged ambulation > 30 mins. Decreased core trunk lumbar stabilization is noted 4-/5; all dermatomes/ myotomes are intact and symmetrical.  Signs and symptoms are consistent with physical therapy diagnosis of lumbar stenosis/ LBP  Prognosis: good  Functional Limitations: carrying objects, lifting, sleeping, walking, pulling, uncomfortable because of pain, moving in bed, standing, stooping and unable to perform repetitive  tasks    Prognosis: good  Functional Limitations: walking, uncomfortable because of pain, standing and stooping  Goals  Plan Goals: Goals  Plan Goals: stg 6 wks    Pt to be educated/ independent with initial HEP met    Decreased (B) QL ms guarding from severe to minimal to allow for increased ease with all transfer/ increased ease with dressing tasks met    Improve lumbar AROM to WFL to allow for increased ease with all ADL's met    ltg 12 wks    Increase core trunk lumbar stabilization MMT to 4/5 to allow for 35 mins prolonged ambulation tolerance without evidence of acute LBP consistently.  ongoing    Improve oswestry score from 48% ongoing      Plan  Therapy options: will be seen for skilled physical therapy services  Planned modality interventions: cryotherapy, electrical stimulation/Russian stimulation, TENS, ultrasound and thermotherapy (hydrocollator packs)  Planned therapy interventions: abdominal trunk stabilization, manual therapy, motor coordination training, neuromuscular re-education, balance/weight-bearing training, ADL retraining, flexibility, functional ROM exercises, gait training, home exercise program, joint mobilization, transfer training, therapeutic activities, strengthening, stretching and soft tissue mobilization  Duration in weeks: 12  Treatment plan discussed with: patient           Recommendations: Continue as planned  Timeframe: 1 month  Prognosis to achieve goals: good    PT Signature: Flo Bernardo, PT      Based upon review of the patient's progress and continued therapy plan, it is my medical opinion that Branden Brady should continue physical therapy treatment at [facility]  [address] [place] [address]   [place] [address]  [phone].    Signature: __________________________________  Panfilo Roland MD    Timed:  Manual Therapy:         mins  44117;  Therapeutic Exercise:   40      mins  34581;     Neuromuscular Stephy:       mins   72641;    Therapeutic Activity:          mins  64557;     Gait Training:           mins  98764;     Ultrasound:          mins  44124;    Electrical Stimulation:         mins  54092 ( );  Iontophoresis         mins 12863;  Dry Needling                   mins    Untimed:  Electrical Stimulation:         mins  64541 ( );  Mechanical Traction:         mins  88681;     Timed Treatment:    40  mins   Total Treatment:     40   mins

## 2021-08-17 ENCOUNTER — OFFICE VISIT (OUTPATIENT)
Dept: INTERNAL MEDICINE | Facility: CLINIC | Age: 71
End: 2021-08-17

## 2021-08-17 VITALS
TEMPERATURE: 98 F | HEART RATE: 67 BPM | BODY MASS INDEX: 28.99 KG/M2 | WEIGHT: 214 LBS | DIASTOLIC BLOOD PRESSURE: 78 MMHG | SYSTOLIC BLOOD PRESSURE: 130 MMHG | HEIGHT: 72 IN | OXYGEN SATURATION: 99 %

## 2021-08-17 DIAGNOSIS — Z12.11 COLON CANCER SCREENING: ICD-10-CM

## 2021-08-17 DIAGNOSIS — R73.01 ELEVATED FASTING GLUCOSE: ICD-10-CM

## 2021-08-17 DIAGNOSIS — E78.5 HYPERLIPIDEMIA, UNSPECIFIED HYPERLIPIDEMIA TYPE: Primary | ICD-10-CM

## 2021-08-17 PROBLEM — R03.0 ELEVATED BLOOD PRESSURE READING WITHOUT DIAGNOSIS OF HYPERTENSION: Status: ACTIVE | Noted: 2021-04-22

## 2021-08-17 PROBLEM — I25.10 CALCIFIC CORONARY ARTERIOSCLEROSIS: Status: ACTIVE | Noted: 2021-04-22

## 2021-08-17 PROBLEM — I70.0 ABDOMINAL AORTIC ATHEROSCLEROSIS (HCC): Status: ACTIVE | Noted: 2021-04-22

## 2021-08-17 PROBLEM — I71.21 ANEURYSM OF ASCENDING AORTA (HCC): Status: ACTIVE | Noted: 2021-04-22

## 2021-08-17 PROCEDURE — 99214 OFFICE O/P EST MOD 30 MIN: CPT | Performed by: INTERNAL MEDICINE

## 2021-08-17 NOTE — PROGRESS NOTES
"Chief Complaint   Patient presents with   • Hyperlipidemia       Subjective   Branden Brady is a 71 y.o. male.     Hyperlipidemia  This is a chronic problem. Recent lipid tests were reviewed and are normal. He has no history of diabetes. Pertinent negatives include no chest pain or shortness of breath. Current antihyperlipidemic treatment includes diet change and exercise. The current treatment provides significant improvement of lipids. There are no compliance problems.       Elevated fasting glucose:  This is a chronic problem, currently treated with diet.    He denies polyuria, polydipsia, vision change appetite change, unexplained weight loss.   Lab Results   Component Value Date    HGBA1C 5.50 02/18/2021        The following portions of the patient's history were reviewed and updated as appropriate: allergies, current medications, past family history, past medical history, past social history, past surgical history and problem list.      Current Outpatient Medications:   •  Multiple Vitamins-Minerals (MULTIVITAMIN ADULT PO), Take  by mouth Daily., Disp: , Rfl:   •  rosuvastatin (Crestor) 10 MG tablet, Take 1 tablet by mouth Every Night., Disp: 30 tablet, Rfl: 5  •  tretinoin (RETIN-A) 0.05 % cream, Every Night., Disp: , Rfl: 5           Review of Systems   Constitutional: Negative for appetite change and fever.   HENT: Negative for nosebleeds.    Eyes: Negative for blurred vision and double vision.   Respiratory: Negative for cough and shortness of breath.    Cardiovascular: Negative for chest pain, palpitations and leg swelling.           Objective     /78 (BP Location: Left arm, Patient Position: Sitting, Cuff Size: Adult)   Pulse 67   Temp 98 °F (36.7 °C)   Ht 182.9 cm (72\")   Wt 97.1 kg (214 lb)   SpO2 99%   BMI 29.02 kg/m²       Physical Exam  Vitals and nursing note reviewed.   Constitutional:       General: He is not in acute distress.     Appearance: He is well-developed.   Neck:      " Vascular: Normal carotid pulses. No carotid bruit.   Cardiovascular:      Rate and Rhythm: Regular rhythm.      Pulses:           Carotid pulses are 2+ on the right side and 2+ on the left side.     Heart sounds: S1 normal and S2 normal. No murmur heard.   No friction rub. No gallop.    Pulmonary:      Effort: Pulmonary effort is normal.      Breath sounds: Normal breath sounds. No wheezing, rhonchi or rales.   Musculoskeletal:      Right lower leg: No edema.      Left lower leg: No edema.   Skin:     General: Skin is warm and dry.   Neurological:      Mental Status: He is alert and oriented to person, place, and time.       Lab Results   Component Value Date    CHOL 254 (H) 02/05/2019    CHLPL 161 06/02/2021    TRIG 50 06/02/2021    HDL 75 (H) 06/02/2021    LDL 75 06/02/2021           Assessment/Plan   Diagnoses and all orders for this visit:    1. Hyperlipidemia, unspecified hyperlipidemia type (Primary)  -     Comprehensive Metabolic Panel  -     Lipid Panel With / Chol / HDL Ratio  -     High Sensitivity CRP    2. Elevated fasting glucose  -     Hemoglobin A1c    3. Colon cancer screening  -     Ambulatory Referral For Screening Colonoscopy    He will continue same medications.  Encouraged prudent diet and regular exercise.                  Return in about 6 months (around 2/17/2022) for AWV, fasting lab.

## 2021-08-18 LAB
ALBUMIN SERPL-MCNC: 4.3 G/DL (ref 3.5–5.2)
ALBUMIN/GLOB SERPL: 1.9 G/DL
ALP SERPL-CCNC: 61 U/L (ref 39–117)
ALT SERPL-CCNC: 28 U/L (ref 1–41)
AST SERPL-CCNC: 37 U/L (ref 1–40)
BILIRUB SERPL-MCNC: 0.6 MG/DL (ref 0–1.2)
BUN SERPL-MCNC: 15 MG/DL (ref 8–23)
BUN/CREAT SERPL: 16 (ref 7–25)
CALCIUM SERPL-MCNC: 9.5 MG/DL (ref 8.6–10.5)
CHLORIDE SERPL-SCNC: 106 MMOL/L (ref 98–107)
CHOLEST SERPL-MCNC: 164 MG/DL (ref 0–200)
CHOLEST/HDLC SERPL: 2.25 {RATIO}
CO2 SERPL-SCNC: 26.6 MMOL/L (ref 22–29)
CREAT SERPL-MCNC: 0.94 MG/DL (ref 0.76–1.27)
CRP SERPL HS-MCNC: 0.83 MG/L (ref 0–3)
GLOBULIN SER CALC-MCNC: 2.3 GM/DL
GLUCOSE SERPL-MCNC: 101 MG/DL (ref 65–99)
HBA1C MFR BLD: 5.7 % (ref 4.8–5.6)
HDLC SERPL-MCNC: 73 MG/DL (ref 40–60)
LDLC SERPL CALC-MCNC: 81 MG/DL (ref 0–100)
POTASSIUM SERPL-SCNC: 4.7 MMOL/L (ref 3.5–5.2)
PROT SERPL-MCNC: 6.6 G/DL (ref 6–8.5)
SODIUM SERPL-SCNC: 142 MMOL/L (ref 136–145)
TRIGL SERPL-MCNC: 49 MG/DL (ref 0–150)
VLDLC SERPL CALC-MCNC: 10 MG/DL (ref 5–40)

## 2021-08-23 ENCOUNTER — TELEPHONE (OUTPATIENT)
Dept: GASTROENTEROLOGY | Facility: CLINIC | Age: 71
End: 2021-08-23

## 2021-08-26 ENCOUNTER — TREATMENT (OUTPATIENT)
Dept: PHYSICAL THERAPY | Facility: CLINIC | Age: 71
End: 2021-08-26

## 2021-08-26 DIAGNOSIS — G89.29 CHRONIC BILATERAL LOW BACK PAIN WITH BILATERAL SCIATICA: Primary | ICD-10-CM

## 2021-08-26 DIAGNOSIS — M54.41 CHRONIC BILATERAL LOW BACK PAIN WITH BILATERAL SCIATICA: Primary | ICD-10-CM

## 2021-08-26 DIAGNOSIS — M54.42 CHRONIC BILATERAL LOW BACK PAIN WITH BILATERAL SCIATICA: Primary | ICD-10-CM

## 2021-08-26 DIAGNOSIS — M48.062 SPINAL STENOSIS OF LUMBAR REGION WITH NEUROGENIC CLAUDICATION: ICD-10-CM

## 2021-08-26 PROCEDURE — 97110 THERAPEUTIC EXERCISES: CPT | Performed by: PHYSICAL THERAPIST

## 2021-08-26 NOTE — PROGRESS NOTES
Physical Therapy Daily Progress Note    Patient: Branden Brady   : 1950  Diagnosis/ICD-10 Code:  Chronic bilateral low back pain with bilateral sciatica [M54.42, M54.41, G89.29]  Referring practitioner: Panfilo Roland MD  Date of Initial Visit: Type: THERAPY  Noted: 2021  Today's Date: 2021  Patient seen for 3 sessions           Subjective back is doing great. Questions about HEP    Objective   See Exercise, Manual, and Modality Logs for complete treatment.       Assessment/Plan  Progressed with lumbopelvic AROM/ mild stabilization per pt tolerance.  No pain with pre. Core trunk lumbar stabilization MMT 4+/5; pt denies radicular symptoms.  All stg met. Partially met ltg.  Mild cuing with HEP. Possible candidate for discharge next visit         Timed:    Manual Therapy:        mins  04556;  Therapeutic Exercise:    45     mins  65351;     Neuromuscular Stephy:        mins  22776;    Therapeutic Activity:          mins  83718;     Gait Training:           mins  00155;     Ultrasound:          mins  89482;    Electrical Stimulation:         mins  16891 ( );  Iontophoresis         mins 80165;  Aquatic Therapy         mins 61724;  Dry Needling                   mins    Untimed:  Electrical Stimulation:         mins  29633 ( );  Mechanical Traction:         mins  08487;     Timed Treatment:   45   mins   Total Treatment:    45    mins  Flo Bernardo, PT  Physical Therapist

## 2021-09-10 RX ORDER — ROSUVASTATIN CALCIUM 10 MG/1
10 TABLET, COATED ORAL NIGHTLY
Qty: 90 TABLET | Refills: 0 | Status: SHIPPED | OUTPATIENT
Start: 2021-09-10 | End: 2021-12-02

## 2021-09-13 ENCOUNTER — TREATMENT (OUTPATIENT)
Dept: PHYSICAL THERAPY | Facility: CLINIC | Age: 71
End: 2021-09-13

## 2021-09-13 DIAGNOSIS — G89.29 CHRONIC BILATERAL LOW BACK PAIN WITH BILATERAL SCIATICA: Primary | ICD-10-CM

## 2021-09-13 DIAGNOSIS — M54.42 CHRONIC BILATERAL LOW BACK PAIN WITH BILATERAL SCIATICA: Primary | ICD-10-CM

## 2021-09-13 DIAGNOSIS — M54.41 CHRONIC BILATERAL LOW BACK PAIN WITH BILATERAL SCIATICA: Primary | ICD-10-CM

## 2021-09-13 DIAGNOSIS — M48.062 SPINAL STENOSIS OF LUMBAR REGION WITH NEUROGENIC CLAUDICATION: ICD-10-CM

## 2021-09-13 PROCEDURE — 97110 THERAPEUTIC EXERCISES: CPT | Performed by: PHYSICAL THERAPIST

## 2021-09-14 NOTE — PROGRESS NOTES
Physical Therapy Daily Progress Note    Patient: Branden Brady   : 1950  Diagnosis/ICD-10 Code:  Chronic bilateral low back pain with bilateral sciatica [M54.42, M54.41, G89.29]  Referring practitioner: Paniflo Roland MD  Date of Initial Visit: Type: THERAPY  Noted: 2021  Today's Date: 2021  Patient seen for 4 sessions           Subjective no pain doing great. I have a couple of questions about HEP.    Objective   See Exercise, Manual, and Modality Logs for complete treatment.       Assessment/Plan  Issued HEP.  Independent with HEP. Full lumbar AROM. Denies radicular pain. Increased core trunk lumbar stabilization 4+/5; no pain with transfers. No posterior pelvic tilt; (-) dural test. All goals are met. Pt appropriate for discharge. Agrees with POC and is satisfied with all intervention provided.          Timed:    Manual Therapy:        mins  36670;  Therapeutic Exercise:    30     mins  28355;     Neuromuscular Stephy:        mins  41054;    Therapeutic Activity:          mins  74636;     Gait Training:           mins  69312;     Ultrasound:          mins  19775;    Electrical Stimulation:         mins  84824 ( );  Iontophoresis         mins 04868;  Aquatic Therapy         mins 22428;  Dry Needling                   mins    Untimed:  Electrical Stimulation:         mins  64957 ( );  Mechanical Traction:         mins  36813;     Timed Treatment:   30   mins   Total Treatment:     30   mins  Flo Bernardo, PT  Physical Therapist

## 2021-12-02 RX ORDER — ROSUVASTATIN CALCIUM 10 MG/1
TABLET, COATED ORAL
Qty: 90 TABLET | Refills: 0 | Status: SHIPPED | OUTPATIENT
Start: 2021-12-02 | End: 2022-02-22 | Stop reason: SDUPTHER

## 2022-02-10 ENCOUNTER — TELEPHONE (OUTPATIENT)
Dept: INTERNAL MEDICINE | Facility: CLINIC | Age: 72
End: 2022-02-10

## 2022-02-10 NOTE — TELEPHONE ENCOUNTER
Spoke with the pt and advised there are not any more new patient appts until May, he kept his appt as scheduled

## 2022-02-10 NOTE — TELEPHONE ENCOUNTER
PATIENT WANTED TO RESCHEDULE WITH DR BENITEZ BUT THE SOONEST AVAILABLE WAS IN MAY     DO YOU HAVE A WAY TO GET HIM IN SOONER FOR A MORNING APPT WITH DR BENITEZ?       Branden Brady ([facility]) [phone] (H)

## 2022-02-17 ENCOUNTER — OFFICE VISIT (OUTPATIENT)
Dept: INTERNAL MEDICINE | Facility: CLINIC | Age: 72
End: 2022-02-17

## 2022-02-17 VITALS
SYSTOLIC BLOOD PRESSURE: 140 MMHG | TEMPERATURE: 99.1 F | HEIGHT: 72 IN | BODY MASS INDEX: 28.85 KG/M2 | HEART RATE: 83 BPM | DIASTOLIC BLOOD PRESSURE: 84 MMHG | WEIGHT: 213 LBS | OXYGEN SATURATION: 98 %

## 2022-02-17 DIAGNOSIS — Z76.89 ENCOUNTER TO ESTABLISH CARE WITH NEW DOCTOR: Primary | ICD-10-CM

## 2022-02-17 DIAGNOSIS — R73.09 ELEVATED HEMOGLOBIN A1C: ICD-10-CM

## 2022-02-17 DIAGNOSIS — E03.9 HYPOTHYROIDISM (ACQUIRED): ICD-10-CM

## 2022-02-17 DIAGNOSIS — E78.2 MIXED HYPERLIPIDEMIA: ICD-10-CM

## 2022-02-17 DIAGNOSIS — I71.21 ANEURYSM OF ASCENDING AORTA: ICD-10-CM

## 2022-02-17 DIAGNOSIS — C44.722 SQUAMOUS CELL CARCINOMA OF SKIN OF RIGHT LOWER EXTREMITY: ICD-10-CM

## 2022-02-17 DIAGNOSIS — Z12.5 SCREENING FOR PROSTATE CANCER: ICD-10-CM

## 2022-02-17 DIAGNOSIS — Z13.89 SCREENING FOR BLOOD OR PROTEIN IN URINE: ICD-10-CM

## 2022-02-17 PROBLEM — I25.10 CALCIFIC CORONARY ARTERIOSCLEROSIS: Chronic | Status: ACTIVE | Noted: 2021-04-22

## 2022-02-17 PROBLEM — M48.062 SPINAL STENOSIS OF LUMBAR REGION WITH NEUROGENIC CLAUDICATION: Chronic | Status: ACTIVE | Noted: 2020-07-09

## 2022-02-17 PROBLEM — K44.9 HIATAL HERNIA: Chronic | Status: ACTIVE | Noted: 2022-02-17

## 2022-02-17 PROBLEM — M51.36 DDD (DEGENERATIVE DISC DISEASE), LUMBAR: Chronic | Status: ACTIVE | Noted: 2017-05-31

## 2022-02-17 PROBLEM — R91.1 PULMONARY NODULE: Status: RESOLVED | Noted: 2022-02-17 | Resolved: 2022-02-17

## 2022-02-17 PROBLEM — R91.1 PULMONARY NODULE: Status: ACTIVE | Noted: 2022-02-17

## 2022-02-17 PROBLEM — I70.0 ABDOMINAL AORTIC ATHEROSCLEROSIS (HCC): Chronic | Status: ACTIVE | Noted: 2021-04-22

## 2022-02-17 PROBLEM — M51.369 DDD (DEGENERATIVE DISC DISEASE), LUMBAR: Chronic | Status: ACTIVE | Noted: 2017-05-31

## 2022-02-17 PROCEDURE — 99215 OFFICE O/P EST HI 40 MIN: CPT | Performed by: FAMILY MEDICINE

## 2022-02-17 RX ORDER — ROSUVASTATIN CALCIUM 10 MG/1
10 TABLET, COATED ORAL NIGHTLY
Qty: 90 TABLET | Refills: 3 | Status: CANCELLED | OUTPATIENT
Start: 2022-02-17

## 2022-02-17 NOTE — PROGRESS NOTES
"Chief Complaint  Establish Care (new patient ) and Hyperlipidemia    Subjective          Branden Brady presents to [facility]  History of Present Illness     Establish care, prior PCP Sha STONE-needs recheck.  Patient taking rosuvastatin 10 mg daily as prescribed.  Trying to adhere to a balanced diet.  Goal LDL less than 70 due to history of calcific coronary arteriosclerosis.  Last check LDL slightly out of goal at 81.      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  Last seen by Dr. Cortez with dermatology.  He had MOHS surgeries.    Elevated A1c in August 2021 with an A1c of 5.70 but a fasting sugar 101.  This is the highest his A1c has been in the past.    He saw Dr. Reyes this past summer for follow-up of his aneurysm of the ascending aorta.  Note reviewed.  His aneurysm was 4.6 cm.  He is not a smoker.  Deemed it to be low risk.  Recommended continued medical management, watching blood pressure, smoking abstinence and follow-up in 1 year which will be July 2022.    He is trying to walk multiple miles daily but trying to get back to running but lumbar spinal stenosis has been an issue.  He has seen Dr. Buenrotsro.    Objective   Vital Signs:   /84 (BP Location: Left arm, Patient Position: Sitting, Cuff Size: Adult)   Pulse 83   Temp 99.1 °F (37.3 °C) (Temporal)   Ht 182.9 cm (72.01\")   Wt 96.6 kg (213 lb)   SpO2 98%   BMI 28.88 kg/m²     Physical Exam  Vitals and nursing note reviewed.   Constitutional:       General: He is not in acute distress.     Appearance: Normal appearance.   Neck:      Vascular: No carotid bruit.   Cardiovascular:      Rate and Rhythm: Normal rate and regular rhythm.      Heart sounds: Normal heart sounds. No murmur heard.      Pulmonary:      Effort: Pulmonary effort is normal.      Breath sounds: Normal breath sounds.   Neurological:      Mental Status: He is alert. "        Result Review :   The following data was reviewed by: Kel Mcnally MD on 02/17/2022:  Common labs    Common Labsle 6/2/21 6/2/21 8/17/21 8/17/21 8/17/21    0958 0958 0905 0905 0905   Glucose  96 101 (A)     BUN  17 15     Creatinine  0.96 0.94     eGFR Non  Am  78 80     eGFR African Am  94 96     Sodium  142 142     Potassium  4.6 4.7     Chloride  105 106     Calcium  9.9 9.5     Total Protein  6.5 6.6     Albumin  4.40 4.30     Total Bilirubin  0.6 0.6     Alkaline Phosphatase  64 61     AST (SGOT)  33 37     ALT (SGPT)  27 28     Total Cholesterol 161   164    Triglycerides 50   49    HDL Cholesterol 75 (A)   73 (A)    LDL Cholesterol  75   81    Hemoglobin A1C     5.70 (A)   (A) Abnormal value       Comments are available for some flowsheets but are not being displayed.                     Assessment and Plan    Diagnoses and all orders for this visit:    1. Encounter to establish care with new doctor (Primary)  -     CBC (No Diff)  -     Comprehensive Metabolic Panel  -     Lipid Panel With LDL / HDL Ratio  -     Hemoglobin A1c  -     PSA SCREENING  -     Urinalysis With Microscopic - Urine, Clean Catch  -     TSH Rfx On Abnormal To Free T4    2. Mixed hyperlipidemia  -     CBC (No Diff)  -     Comprehensive Metabolic Panel  -     Lipid Panel With LDL / HDL Ratio    3. Squamous cell carcinoma of skin of right lower extremity    4. Elevated hemoglobin A1c  -     Lipid Panel With LDL / HDL Ratio  -     Hemoglobin A1c    5. Aneurysm of ascending aorta (HCC)    6. Screening for prostate cancer  -     PSA SCREENING    7. Hypothyroidism (acquired)  -     TSH Rfx On Abnormal To Free T4    8. Screening for blood or protein in urine  -     Urinalysis With Microscopic - Urine, Clean Catch      Ordered labs as above to check on his general health.  Could consider increasing rosuvastatin depending on results of lipid panel.  Continue follow-up per vascular regarding the aneurysm of the ascending aorta.   Continue follow-up with dermatology regarding the squamous cell carcinoma of the right lower extremity.  Looking through his chart it looks like his previous primary care was ordering various hormones and he was asking me if we should continue ordering those.  I did a chart review and I could not see any diagnoses or reason for ordering things such as DHEA, testosterone, estradiol, homocystine, testosterone, etc. for general health monitoring.  I am not saying anything against his previous primary care.  However with my training in primary care and family medicine and geriatrics, I do not see any necessity for these tests without history and physical exam to go with them.  Monitoring these tests on a routine basis would not give me any extra information to keep him healthy.      I spent 40 minutes caring for Branden on this date of service. This time includes time spent by me in the following activities:preparing for the visit, reviewing tests, obtaining and/or reviewing a separately obtained history, performing a medically appropriate examination and/or evaluation , counseling and educating the patient/family/caregiver, ordering medications, tests, or procedures, referring and communicating with other health care professionals , documenting information in the medical record, independently interpreting results and communicating that information with the patient/family/caregiver and Spent the majority of the visit counseling the patient regarding the necessity of certain tests and exams and why many of these tests from the past are not going to be helpful in monitoring his general health for the future  Follow Up   Return in about 1 year (around 2/20/2023) for Medicare Wellness.  Patient was given instructions and counseling regarding his condition or for health maintenance advice. Please see specific information pulled into the AVS if appropriate.

## 2022-02-22 DIAGNOSIS — I25.10 CALCIFIC CORONARY ARTERIOSCLEROSIS: ICD-10-CM

## 2022-02-22 DIAGNOSIS — E78.2 MIXED HYPERLIPIDEMIA: Primary | ICD-10-CM

## 2022-02-22 LAB
ALBUMIN SERPL-MCNC: 4.3 G/DL (ref 3.8–4.8)
ALBUMIN/GLOB SERPL: 1.9 {RATIO} (ref 1.2–2.2)
ALP SERPL-CCNC: 63 IU/L (ref 44–121)
ALT SERPL-CCNC: 39 IU/L (ref 0–44)
APPEARANCE UR: CLEAR
AST SERPL-CCNC: 50 IU/L (ref 0–40)
BACTERIA #/AREA URNS HPF: NORMAL /[HPF]
BILIRUB SERPL-MCNC: 0.7 MG/DL (ref 0–1.2)
BILIRUB UR QL STRIP: NEGATIVE
BUN SERPL-MCNC: 20 MG/DL (ref 8–27)
BUN/CREAT SERPL: 21 (ref 10–24)
CALCIUM SERPL-MCNC: 9.7 MG/DL (ref 8.6–10.2)
CASTS URNS QL MICRO: NORMAL /LPF
CHLORIDE SERPL-SCNC: 103 MMOL/L (ref 96–106)
CHOLEST SERPL-MCNC: 167 MG/DL (ref 100–199)
CO2 SERPL-SCNC: 25 MMOL/L (ref 20–29)
COLOR UR: YELLOW
CREAT SERPL-MCNC: 0.97 MG/DL (ref 0.76–1.27)
EPI CELLS #/AREA URNS HPF: NORMAL /HPF (ref 0–10)
ERYTHROCYTE [DISTWIDTH] IN BLOOD BY AUTOMATED COUNT: 13.1 % (ref 11.6–15.4)
GLOBULIN SER CALC-MCNC: 2.3 G/DL (ref 1.5–4.5)
GLUCOSE SERPL-MCNC: 101 MG/DL (ref 65–99)
GLUCOSE UR QL STRIP: NEGATIVE
HBA1C MFR BLD: 5.8 % (ref 4.8–5.6)
HCT VFR BLD AUTO: 48.8 % (ref 37.5–51)
HDLC SERPL-MCNC: 78 MG/DL
HGB BLD-MCNC: 16.5 G/DL (ref 13–17.7)
HGB UR QL STRIP: NEGATIVE
KETONES UR QL STRIP: NEGATIVE
LDLC SERPL CALC-MCNC: 79 MG/DL (ref 0–99)
LDLC/HDLC SERPL: 1 RATIO (ref 0–3.6)
LEUKOCYTE ESTERASE UR QL STRIP: NEGATIVE
MCH RBC QN AUTO: 30.8 PG (ref 26.6–33)
MCHC RBC AUTO-ENTMCNC: 33.8 G/DL (ref 31.5–35.7)
MCV RBC AUTO: 91 FL (ref 79–97)
MICRO URNS: NORMAL
MICRO URNS: NORMAL
NITRITE UR QL STRIP: NEGATIVE
PH UR STRIP: 6.5 [PH] (ref 5–7.5)
PLATELET # BLD AUTO: 242 X10E3/UL (ref 150–450)
POTASSIUM SERPL-SCNC: 5 MMOL/L (ref 3.5–5.2)
PROT SERPL-MCNC: 6.6 G/DL (ref 6–8.5)
PROT UR QL STRIP: NEGATIVE
PSA SERPL-MCNC: 1.9 NG/ML (ref 0–4)
RBC # BLD AUTO: 5.35 X10E6/UL (ref 4.14–5.8)
RBC #/AREA URNS HPF: NORMAL /HPF (ref 0–2)
SODIUM SERPL-SCNC: 142 MMOL/L (ref 134–144)
SP GR UR STRIP: 1.02 (ref 1–1.03)
T4 FREE SERPL-MCNC: 1.05 NG/DL (ref 0.82–1.77)
TRIGL SERPL-MCNC: 49 MG/DL (ref 0–149)
TSH SERPL DL<=0.005 MIU/L-ACNC: 5.2 UIU/ML (ref 0.45–4.5)
UROBILINOGEN UR STRIP-MCNC: 0.2 MG/DL (ref 0.2–1)
VLDLC SERPL CALC-MCNC: 10 MG/DL (ref 5–40)
WBC # BLD AUTO: 8.7 X10E3/UL (ref 3.4–10.8)
WBC #/AREA URNS HPF: NORMAL /HPF (ref 0–5)

## 2022-02-22 RX ORDER — ROSUVASTATIN CALCIUM 20 MG/1
20 TABLET, COATED ORAL NIGHTLY
Qty: 90 TABLET | Refills: 3 | Status: SHIPPED | OUTPATIENT
Start: 2022-02-22

## 2022-04-11 ENCOUNTER — TELEPHONE (OUTPATIENT)
Dept: INTERNAL MEDICINE | Facility: CLINIC | Age: 72
End: 2022-04-11

## 2022-04-11 NOTE — TELEPHONE ENCOUNTER
Caller: Branden Brady    Relationship to patient: Self    Best call back number:[phone]       Chief complaint: patient is wanting to schedule labs     Type of visit: LABS     Requested date: 4/12/22     PLEASE GIVE PATIENT A CALL

## 2022-04-12 ENCOUNTER — LAB (OUTPATIENT)
Dept: INTERNAL MEDICINE | Facility: CLINIC | Age: 72
End: 2022-04-12

## 2022-04-12 DIAGNOSIS — I25.10 CALCIFIC CORONARY ARTERIOSCLEROSIS: ICD-10-CM

## 2022-04-12 DIAGNOSIS — E78.2 MIXED HYPERLIPIDEMIA: ICD-10-CM

## 2022-04-13 LAB
ALBUMIN SERPL-MCNC: 4.1 G/DL (ref 3.8–4.8)
ALBUMIN/GLOB SERPL: 1.6 {RATIO} (ref 1.2–2.2)
ALP SERPL-CCNC: 60 IU/L (ref 44–121)
ALT SERPL-CCNC: 36 IU/L (ref 0–44)
AST SERPL-CCNC: 49 IU/L (ref 0–40)
BILIRUB SERPL-MCNC: 0.5 MG/DL (ref 0–1.2)
BUN SERPL-MCNC: 21 MG/DL (ref 8–27)
BUN/CREAT SERPL: 23 (ref 10–24)
CALCIUM SERPL-MCNC: 9.5 MG/DL (ref 8.6–10.2)
CHLORIDE SERPL-SCNC: 104 MMOL/L (ref 96–106)
CHOLEST SERPL-MCNC: 158 MG/DL (ref 100–199)
CO2 SERPL-SCNC: 24 MMOL/L (ref 20–29)
CREAT SERPL-MCNC: 0.91 MG/DL (ref 0.76–1.27)
EGFRCR SERPLBLD CKD-EPI 2021: 91 ML/MIN/1.73
GLOBULIN SER CALC-MCNC: 2.6 G/DL (ref 1.5–4.5)
GLUCOSE SERPL-MCNC: 96 MG/DL (ref 65–99)
HDLC SERPL-MCNC: 74 MG/DL
LDLC SERPL CALC-MCNC: 74 MG/DL (ref 0–99)
LDLC/HDLC SERPL: 1 RATIO (ref 0–3.6)
POTASSIUM SERPL-SCNC: 5.2 MMOL/L (ref 3.5–5.2)
PROT SERPL-MCNC: 6.7 G/DL (ref 6–8.5)
SODIUM SERPL-SCNC: 141 MMOL/L (ref 134–144)
TRIGL SERPL-MCNC: 47 MG/DL (ref 0–149)
VLDLC SERPL CALC-MCNC: 10 MG/DL (ref 5–40)

## 2022-04-19 ENCOUNTER — CLINICAL SUPPORT (OUTPATIENT)
Dept: INTERNAL MEDICINE | Facility: CLINIC | Age: 72
End: 2022-04-19

## 2022-06-13 ENCOUNTER — TRANSCRIBE ORDERS (OUTPATIENT)
Dept: ADMINISTRATIVE | Facility: HOSPITAL | Age: 72
End: 2022-06-13

## 2022-06-13 DIAGNOSIS — I71.21 ASCENDING AORTIC ANEURYSM: Primary | ICD-10-CM

## 2022-06-30 ENCOUNTER — HOSPITAL ENCOUNTER (OUTPATIENT)
Dept: CT IMAGING | Facility: HOSPITAL | Age: 72
Discharge: HOME | End: 2022-06-30
Admitting: THORACIC SURGERY (CARDIOTHORACIC VASCULAR SURGERY)

## 2022-06-30 DIAGNOSIS — I71.21 ASCENDING AORTIC ANEURYSM: ICD-10-CM

## 2022-06-30 PROCEDURE — 71250 CT THORAX DX C-: CPT

## 2022-07-14 ENCOUNTER — TELEPHONE (OUTPATIENT)
Dept: INTERNAL MEDICINE | Facility: CLINIC | Age: 72
End: 2022-07-14

## 2022-07-14 NOTE — TELEPHONE ENCOUNTER
Caller: Branden Brady    Relationship: Self    Best call back number: [phone]     What is the best time to reach you: ANY    Who are you requesting to speak with (clinical staff, provider,  specific staff member): DR. BENITEZ OR MA    What was the call regarding: PATIENT DID NOT PROVIDE DETAILS - ONLY WISHED TO SPEAK WITH IN OFFICE STAFF    Do you require a callback: YES

## 2022-07-14 NOTE — TELEPHONE ENCOUNTER
S/W Patient- he wanted to discuss his appt Monday with specialist. Nothing more needed at this time. Dr Mcnally unavailable until Tues

## 2022-07-25 ENCOUNTER — TELEPHONE (OUTPATIENT)
Dept: NEUROSURGERY | Facility: CLINIC | Age: 72
End: 2022-07-25

## 2022-07-25 DIAGNOSIS — M54.41 CHRONIC BILATERAL LOW BACK PAIN WITH BILATERAL SCIATICA: Primary | ICD-10-CM

## 2022-07-25 DIAGNOSIS — M54.42 CHRONIC BILATERAL LOW BACK PAIN WITH BILATERAL SCIATICA: Primary | ICD-10-CM

## 2022-07-25 DIAGNOSIS — M51.36 DDD (DEGENERATIVE DISC DISEASE), LUMBAR: ICD-10-CM

## 2022-07-25 DIAGNOSIS — G89.29 CHRONIC BILATERAL LOW BACK PAIN WITH BILATERAL SCIATICA: Primary | ICD-10-CM

## 2022-07-25 DIAGNOSIS — M48.062 SPINAL STENOSIS OF LUMBAR REGION WITH NEUROGENIC CLAUDICATION: ICD-10-CM

## 2022-07-25 NOTE — TELEPHONE ENCOUNTER
I spoke with the patient and advised that he will need re-evaluation but he is wanting to have imaging ordered before he comes into the office. I advised we could set him up with an APC for them to do a re-evaluation and order imaging and FU with Dr. ELOY childers, but he wants to have imaging prior to any visit in the office.

## 2022-07-25 NOTE — TELEPHONE ENCOUNTER
Pt called: he is still having pain in the leg area. He is having numbness and tingling in the legs. Denies B/B issues. Pt would like to schedule a visit with Dr. LYONS but wants to know if he can have some new imaging completed prior to visit. Please advise!    Last seen: 7/16/21 W/Dr. LYONS  Office Visit with Panfilo Roland MD (07/16/2021)        Pt contact: [phone]  Best time to call: anytime

## 2022-07-26 NOTE — TELEPHONE ENCOUNTER
"Per Dr. Roland, \"Go ahead and order a noncontrast lumbar MRI before the next visit.\"    Order placed and sent to scheduling.     Pt is aware of order, and an appointment was made.   "

## 2022-07-27 DIAGNOSIS — Z12.11 COLON CANCER SCREENING: Primary | ICD-10-CM

## 2022-07-27 NOTE — PROGRESS NOTES
Patient is wanting to see Dr Brittany Cruz for his C Scope screening  Fax: [phone]  Ph: [phone]

## 2022-08-09 ENCOUNTER — HOSPITAL ENCOUNTER (OUTPATIENT)
Dept: MRI IMAGING | Facility: HOSPITAL | Age: 72
Discharge: HOME | End: 2022-08-09
Admitting: NEUROLOGICAL SURGERY

## 2022-08-09 DIAGNOSIS — M51.36 DDD (DEGENERATIVE DISC DISEASE), LUMBAR: ICD-10-CM

## 2022-08-09 DIAGNOSIS — M54.41 CHRONIC BILATERAL LOW BACK PAIN WITH BILATERAL SCIATICA: ICD-10-CM

## 2022-08-09 DIAGNOSIS — M54.42 CHRONIC BILATERAL LOW BACK PAIN WITH BILATERAL SCIATICA: ICD-10-CM

## 2022-08-09 DIAGNOSIS — M48.062 SPINAL STENOSIS OF LUMBAR REGION WITH NEUROGENIC CLAUDICATION: ICD-10-CM

## 2022-08-09 DIAGNOSIS — G89.29 CHRONIC BILATERAL LOW BACK PAIN WITH BILATERAL SCIATICA: ICD-10-CM

## 2022-08-09 PROCEDURE — 72148 MRI LUMBAR SPINE W/O DYE: CPT

## 2022-08-19 ENCOUNTER — OFFICE VISIT (OUTPATIENT)
Dept: INTERNAL MEDICINE | Facility: CLINIC | Age: 72
End: 2022-08-19

## 2022-08-19 VITALS
HEART RATE: 79 BPM | TEMPERATURE: 98.9 F | HEIGHT: 72 IN | WEIGHT: 203.6 LBS | OXYGEN SATURATION: 94 % | SYSTOLIC BLOOD PRESSURE: 130 MMHG | BODY MASS INDEX: 27.58 KG/M2 | DIASTOLIC BLOOD PRESSURE: 84 MMHG

## 2022-08-19 DIAGNOSIS — E78.2 MIXED HYPERLIPIDEMIA: Primary | ICD-10-CM

## 2022-08-19 DIAGNOSIS — I25.10 CALCIFIC CORONARY ARTERIOSCLEROSIS: Chronic | ICD-10-CM

## 2022-08-19 DIAGNOSIS — E03.8 SUBCLINICAL HYPOTHYROIDISM: ICD-10-CM

## 2022-08-19 DIAGNOSIS — R73.03 PREDIABETES: Chronic | ICD-10-CM

## 2022-08-19 PROCEDURE — 99214 OFFICE O/P EST MOD 30 MIN: CPT | Performed by: FAMILY MEDICINE

## 2022-08-19 NOTE — PROGRESS NOTES
"Chief Complaint  Hyperlipidemia (6 month follow up)    Subjective        Branden Brady presents to [facility]  History of Present Illness     HLD-needs recheck.  Patient taking rosuvastatin 10 mg daily as prescribed.  Trying to adhere to a balanced diet.  Goal LDL less than 70 due to history of calcific coronary arteriosclerosis.  Last check LDL slightly out of goal at 81.      Prediabetes-last A1c 5.8 and has been stable.  Due for recheck.  Trying to eat a healthy diet.  Lost about 10 lbs on purpose.      Subclinical hypothyroidism-last time his TSH was mildly elevated at 5.200 with a free T4 of 1.05 which is on the low end of normal.  Plan was to recheck today and hold on supplement for now.  No symptoms at this time as the chest worsening hypothyroid.    Objective   Vital Signs:  /84 (BP Location: Left arm, Patient Position: Sitting, Cuff Size: Adult)   Pulse 79   Temp 98.9 °F (37.2 °C) (Temporal)   Ht 182.9 cm (72.01\")   Wt 92.4 kg (203 lb 9.6 oz)   SpO2 94%   BMI 27.61 kg/m²   Estimated body mass index is 27.61 kg/m² as calculated from the following:    Height as of this encounter: 182.9 cm (72.01\").    Weight as of this encounter: 92.4 kg (203 lb 9.6 oz).          Physical Exam  Vitals and nursing note reviewed.   Constitutional:       General: He is not in acute distress.     Appearance: Normal appearance.   Cardiovascular:      Rate and Rhythm: Normal rate and regular rhythm.      Heart sounds: Normal heart sounds. No murmur heard.  Pulmonary:      Effort: Pulmonary effort is normal.      Breath sounds: Normal breath sounds.   Neurological:      Mental Status: He is alert.        Result Review :  The following data was reviewed by: Kel Mcnally MD on 08/19/2022:  Common labs    Common Labsle 2/21/22 2/21/22 2/21/22 2/21/22 2/21/22 4/12/22 4/12/22    0946 0946 0946 0946 0946 0851 0851   Glucose  101 (A)     96   BUN  20     21   Creatinine  0.97     0.91 "   eGFR Non  Am  79        eGFR African Am  92        Sodium  142     141   Potassium  5.0     5.2   Chloride  103     104   Calcium  9.7     9.5   Total Protein  6.6     6.7   Albumin  4.3     4.1   Total Bilirubin  0.7     0.5   Alkaline Phosphatase  63     60   AST (SGOT)  50 (A)     49 (A)   ALT (SGPT)  39     36   WBC 8.7         Hemoglobin 16.5         Hematocrit 48.8         Platelets 242         Total Cholesterol   167   158    Triglycerides   49   47    HDL Cholesterol   78   74    LDL Cholesterol    79   74    Hemoglobin A1C    5.8 (A)      PSA     1.9     (A) Abnormal value       Comments are available for some flowsheets but are not being displayed.                     Assessment and Plan   Diagnoses and all orders for this visit:    1. Mixed hyperlipidemia (Primary)  -     CBC & Differential  -     Comprehensive Metabolic Panel  -     Lipid Panel With LDL / HDL Ratio    2. Prediabetes  -     CBC & Differential  -     Comprehensive Metabolic Panel  -     Hemoglobin A1c    3. Calcific coronary arteriosclerosis    4. Subclinical hypothyroidism  -     TSH  -     T4, Free      Stable chronic conditions as above.  Continue all medications as above.  Labs today.           Follow Up   Return in about 6 months (around 2/19/2023) for Medicare Wellness.  Patient was given instructions and counseling regarding his condition or for health maintenance advice. Please see specific information pulled into the AVS if appropriate.

## 2022-08-20 LAB
ALBUMIN SERPL-MCNC: 4.5 G/DL (ref 3.8–4.8)
ALBUMIN/GLOB SERPL: 1.9 {RATIO} (ref 1.2–2.2)
ALP SERPL-CCNC: 58 IU/L (ref 44–121)
ALT SERPL-CCNC: 38 IU/L (ref 0–44)
AST SERPL-CCNC: 48 IU/L (ref 0–40)
BASOPHILS # BLD AUTO: 0.1 X10E3/UL (ref 0–0.2)
BASOPHILS NFR BLD AUTO: 1 %
BILIRUB SERPL-MCNC: 0.7 MG/DL (ref 0–1.2)
BUN SERPL-MCNC: 20 MG/DL (ref 8–27)
BUN/CREAT SERPL: 20 (ref 10–24)
CALCIUM SERPL-MCNC: 9.7 MG/DL (ref 8.6–10.2)
CHLORIDE SERPL-SCNC: 103 MMOL/L (ref 96–106)
CHOLEST SERPL-MCNC: 181 MG/DL (ref 100–199)
CO2 SERPL-SCNC: 24 MMOL/L (ref 20–29)
CREAT SERPL-MCNC: 0.99 MG/DL (ref 0.76–1.27)
EGFRCR-CYS SERPLBLD CKD-EPI 2021: 82 ML/MIN/1.73
EOSINOPHIL # BLD AUTO: 0.2 X10E3/UL (ref 0–0.4)
EOSINOPHIL NFR BLD AUTO: 2 %
ERYTHROCYTE [DISTWIDTH] IN BLOOD BY AUTOMATED COUNT: 13.1 % (ref 11.6–15.4)
GLOBULIN SER CALC-MCNC: 2.4 G/DL (ref 1.5–4.5)
GLUCOSE SERPL-MCNC: 110 MG/DL (ref 65–99)
HBA1C MFR BLD: 5.9 % (ref 4.8–5.6)
HCT VFR BLD AUTO: 48.8 % (ref 37.5–51)
HDLC SERPL-MCNC: 78 MG/DL
HGB BLD-MCNC: 16.4 G/DL (ref 13–17.7)
IMM GRANULOCYTES # BLD AUTO: 0 X10E3/UL (ref 0–0.1)
IMM GRANULOCYTES NFR BLD AUTO: 0 %
LDLC SERPL CALC-MCNC: 93 MG/DL (ref 0–99)
LDLC/HDLC SERPL: 1.2 RATIO (ref 0–3.6)
LYMPHOCYTES # BLD AUTO: 2.6 X10E3/UL (ref 0.7–3.1)
LYMPHOCYTES NFR BLD AUTO: 35 %
MCH RBC QN AUTO: 30.9 PG (ref 26.6–33)
MCHC RBC AUTO-ENTMCNC: 33.6 G/DL (ref 31.5–35.7)
MCV RBC AUTO: 92 FL (ref 79–97)
MONOCYTES # BLD AUTO: 0.8 X10E3/UL (ref 0.1–0.9)
MONOCYTES NFR BLD AUTO: 10 %
NEUTROPHILS # BLD AUTO: 3.9 X10E3/UL (ref 1.4–7)
NEUTROPHILS NFR BLD AUTO: 52 %
PLATELET # BLD AUTO: 229 X10E3/UL (ref 150–450)
POTASSIUM SERPL-SCNC: 5.1 MMOL/L (ref 3.5–5.2)
PROT SERPL-MCNC: 6.9 G/DL (ref 6–8.5)
RBC # BLD AUTO: 5.31 X10E6/UL (ref 4.14–5.8)
SODIUM SERPL-SCNC: 141 MMOL/L (ref 134–144)
T4 FREE SERPL-MCNC: 0.93 NG/DL (ref 0.82–1.77)
TRIGL SERPL-MCNC: 51 MG/DL (ref 0–149)
TSH SERPL DL<=0.005 MIU/L-ACNC: 4.24 UIU/ML (ref 0.45–4.5)
VLDLC SERPL CALC-MCNC: 10 MG/DL (ref 5–40)
WBC # BLD AUTO: 7.5 X10E3/UL (ref 3.4–10.8)

## 2022-09-02 ENCOUNTER — TELEPHONE (OUTPATIENT)
Dept: INTERNAL MEDICINE | Facility: CLINIC | Age: 72
End: 2022-09-02

## 2022-09-02 NOTE — TELEPHONE ENCOUNTER
Caller: Branden Brady    Relationship: Self    Best call back number:[phone]    What is the best time to reach you: ANYTIME    Who are you requesting to speak with (clinical staff, provider,  specific staff member): DR BENITEZ    What was the call regarding: CALL HIM BACK TO GO OVER THE LIPID AND    rosuvastatin (CRESTOR) 20 MG tablet

## 2022-09-08 ENCOUNTER — TELEPHONE (OUTPATIENT)
Dept: INTERNAL MEDICINE | Facility: CLINIC | Age: 72
End: 2022-09-08

## 2022-09-13 DIAGNOSIS — E78.2 MIXED HYPERLIPIDEMIA: Primary | ICD-10-CM

## 2022-09-13 NOTE — TELEPHONE ENCOUNTER
Pt informed of  recommendations to follow cardiologist advice and the reasoning. He still wants to speak with . Detailed message sent to PCP

## 2022-10-13 DIAGNOSIS — E78.2 MIXED HYPERLIPIDEMIA: ICD-10-CM

## 2022-10-18 DIAGNOSIS — R73.03 PREDIABETES: ICD-10-CM

## 2022-10-18 DIAGNOSIS — E78.2 MIXED HYPERLIPIDEMIA: Primary | ICD-10-CM

## 2022-10-18 DIAGNOSIS — E03.8 SUBCLINICAL HYPOTHYROIDISM: ICD-10-CM

## 2022-10-18 DIAGNOSIS — Z12.5 SCREENING FOR PROSTATE CANCER: ICD-10-CM

## 2022-10-18 LAB
ALBUMIN SERPL-MCNC: 4.2 G/DL (ref 3.5–5.2)
ALBUMIN/GLOB SERPL: 2.3 G/DL
ALP SERPL-CCNC: 52 U/L (ref 39–117)
ALT SERPL-CCNC: 32 U/L (ref 1–41)
AST SERPL-CCNC: 39 U/L (ref 1–40)
BILIRUB SERPL-MCNC: 0.6 MG/DL (ref 0–1.2)
BUN SERPL-MCNC: 24 MG/DL (ref 8–23)
BUN/CREAT SERPL: 24.2 (ref 7–25)
CALCIUM SERPL-MCNC: 9.4 MG/DL (ref 8.6–10.5)
CHLORIDE SERPL-SCNC: 105 MMOL/L (ref 98–107)
CHOLEST SERPL-MCNC: 162 MG/DL (ref 0–200)
CO2 SERPL-SCNC: 27.7 MMOL/L (ref 22–29)
CREAT SERPL-MCNC: 0.99 MG/DL (ref 0.76–1.27)
EGFRCR SERPLBLD CKD-EPI 2021: 81.9 ML/MIN/1.73
GLOBULIN SER CALC-MCNC: 1.8 GM/DL
GLUCOSE SERPL-MCNC: 95 MG/DL (ref 65–99)
HDLC SERPL-MCNC: 77 MG/DL (ref 40–60)
LDLC SERPL CALC-MCNC: 75 MG/DL (ref 0–100)
LDLC/HDLC SERPL: 0.98 {RATIO}
POTASSIUM SERPL-SCNC: 4.9 MMOL/L (ref 3.5–5.2)
PROT SERPL-MCNC: 6 G/DL (ref 6–8.5)
SODIUM SERPL-SCNC: 142 MMOL/L (ref 136–145)
TRIGL SERPL-MCNC: 46 MG/DL (ref 0–150)
VLDLC SERPL CALC-MCNC: 10 MG/DL (ref 5–40)

## 2022-11-22 NOTE — PROGRESS NOTES
"Subjective   Patient ID: Branden Brady is a 72 y.o. male is here today for follow-up with a new lumbar MRI. He states he is doing well. He states he has numbness, tngling, and weakness in bilateral legs. He also reports back pain.    I saw this patient last about a year and a half ago.  He comes back to be reevaluated.  He still has back pain and intermittent leg pain.  Its not debilitating and he works and he walks.  He is concerned that this will progress and I told him it certainly possible but if he is functional is really not much to do.  If he develops severe leg pain we can try some epidural blocks.  The back pain is severe we can try an ablation but it simply is not debilitating right now.  I made some suggestions about exercise and told him to try to cut back on high impact cardiovascular activities like running which he is already done and to try perhaps some core work such as Pilates or yoga and work on some resistance.  He understood better his condition and will work on the exercise and we will keep it open-ended.  If symptoms worsen then he will let us know.        History of Present Illness    The following portions of the patient's history were reviewed and updated as appropriate: allergies, current medications, past family history, past medical history, past social history, past surgical history and problem list.    Review of Systems   Constitutional: Negative for fever.   Respiratory: Negative for chest tightness and shortness of breath.    Cardiovascular: Negative for chest pain.   All other systems reviewed and are negative.          Objective     Vitals:    11/23/22 1306   BP: 124/76   Pulse: 73   SpO2: 97%   Weight: 92.4 kg (203 lb 11.3 oz)   Height: 182.9 cm (72.01\")     Body mass index is 27.62 kg/m².    Tobacco Use: Low Risk    • Smoking Tobacco Use: Never   • Smokeless Tobacco Use: Never   • Passive Exposure: Not on file          Physical Exam  Constitutional:       Appearance: He is " well-developed.   HENT:      Head: Normocephalic and atraumatic.   Eyes:      Extraocular Movements: EOM normal.      Conjunctiva/sclera: Conjunctivae normal.      Pupils: Pupils are equal, round, and reactive to light.   Neck:      Vascular: No carotid bruit.   Neurological:      Mental Status: He is oriented to person, place, and time.      Coordination: Finger-Nose-Finger Test and Heel to Shin Test normal.      Gait: Gait is intact.      Deep Tendon Reflexes:      Reflex Scores:       Tricep reflexes are 2+ on the right side and 2+ on the left side.       Bicep reflexes are 2+ on the right side and 2+ on the left side.       Brachioradialis reflexes are 2+ on the right side and 2+ on the left side.       Patellar reflexes are 2+ on the right side and 2+ on the left side.       Achilles reflexes are 2+ on the right side and 2+ on the left side.  Psychiatric:         Speech: Speech normal.       Neurologic Exam     Mental Status   Oriented to person, place, and time.   Registration of memory: Good recent and remote memory.   Attention: normal. Concentration: normal.   Speech: speech is normal   Level of consciousness: alert  Knowledge: consistent with education.     Cranial Nerves     CN II   Visual fields full to confrontation.   Visual acuity: normal    CN III, IV, VI   Pupils are equal, round, and reactive to light.  Extraocular motions are normal.     CN V   Facial sensation intact.   Right corneal reflex: normal  Left corneal reflex: normal    CN VII   Facial expression full, symmetric.   Right facial weakness: none  Left facial weakness: none    CN VIII   Hearing: intact    CN IX, X   Palate: symmetric    CN XI   Right sternocleidomastoid strength: normal  Left sternocleidomastoid strength: normal    CN XII   Tongue: not atrophic  Tongue deviation: none    Motor Exam   Muscle bulk: normal  Right arm tone: normal  Left arm tone: normal  Right leg tone: normal  Left leg tone: normal    Strength   Strength 5/5  except as noted.     Sensory Exam   Light touch normal.     Gait, Coordination, and Reflexes     Gait  Gait: normal    Coordination   Finger to nose coordination: normal  Heel to shin coordination: normal    Reflexes   Right brachioradialis: 2+  Left brachioradialis: 2+  Right biceps: 2+  Left biceps: 2+  Right triceps: 2+  Left triceps: 2+  Right patellar: 2+  Left patellar: 2+  Right achilles: 2+  Left achilles: 2+  Right : 2+  Left : 2+          Assessment & Plan   Independent Review of Radiographic Studies:      I personally reviewed the images from the following studies.    The MRI of the lumbar spine done on 8/9/2022 is about the same as it was in 2020.  There is severe spinal stenosis at L3-L4 with a lesser degree of stenosis at L to L3, L4-L5, L5-S1.  There is mild anterolisthesis at L3-L4.  Agree with the report.        Medical Decision Making:      He is still quite functional and does not need any aggressive treatment such as spinal injections.  I encouraged him to modify his workout routine and add some resistance training, yoga or Pilates, and low impact cardiovascular exercises as opposed to running.  We will keep it open-ended.  If symptoms get worse particular in the legs, he will let us know.      Diagnoses and all orders for this visit:    1. Chronic bilateral low back pain with bilateral sciatica (Primary)    2. DDD (degenerative disc disease), lumbar    3. Spinal stenosis of lumbar region with neurogenic claudication      Return if symptoms worsen or fail to improve.

## 2022-11-23 ENCOUNTER — OFFICE VISIT (OUTPATIENT)
Dept: NEUROSURGERY | Facility: CLINIC | Age: 72
End: 2022-11-23

## 2022-11-23 VITALS
SYSTOLIC BLOOD PRESSURE: 124 MMHG | DIASTOLIC BLOOD PRESSURE: 76 MMHG | HEIGHT: 72 IN | WEIGHT: 203.71 LBS | OXYGEN SATURATION: 97 % | HEART RATE: 73 BPM | BODY MASS INDEX: 27.59 KG/M2

## 2022-11-23 DIAGNOSIS — M51.36 DDD (DEGENERATIVE DISC DISEASE), LUMBAR: ICD-10-CM

## 2022-11-23 DIAGNOSIS — M54.42 CHRONIC BILATERAL LOW BACK PAIN WITH BILATERAL SCIATICA: Primary | ICD-10-CM

## 2022-11-23 DIAGNOSIS — M54.41 CHRONIC BILATERAL LOW BACK PAIN WITH BILATERAL SCIATICA: Primary | ICD-10-CM

## 2022-11-23 DIAGNOSIS — G89.29 CHRONIC BILATERAL LOW BACK PAIN WITH BILATERAL SCIATICA: Primary | ICD-10-CM

## 2022-11-23 DIAGNOSIS — M48.062 SPINAL STENOSIS OF LUMBAR REGION WITH NEUROGENIC CLAUDICATION: ICD-10-CM

## 2022-11-23 PROCEDURE — 99214 OFFICE O/P EST MOD 30 MIN: CPT | Performed by: NEUROLOGICAL SURGERY

## 2023-01-06 DIAGNOSIS — J06.9 URI, ACUTE: ICD-10-CM

## 2023-01-06 RX ORDER — DOXYCYCLINE HYCLATE 100 MG
100 TABLET ORAL 2 TIMES DAILY
Qty: 14 TABLET | Refills: 0 | OUTPATIENT
Start: 2023-01-06

## 2023-01-06 NOTE — TELEPHONE ENCOUNTER
He will need to be seen.  He can go to urgent care or he can use his MyChart and do an e-visit or urgent care video visit.

## 2023-01-06 NOTE — TELEPHONE ENCOUNTER
Caller: Caitlyn Branden J    Relationship: Self    Best call back number:   [phone] (Home)          Requested Prescriptions:   Requested Prescriptions     Pending Prescriptions Disp Refills   • doxycycline (VIBRAMYICN) 100 MG tablet 14 tablet 0     Sig: Take 1 tablet by mouth 2 (Two) Times a Day.        Pharmacy where request should be sent: [facility] [id number] [place] [address] [address] AT [facility] [address] - [phone]  - [phone] FX     Additional details provided by patient: PATIENT STATES HE IS DEVELOPING A SORE THROAT AND THIS IS WHAT HE HAS ALWAYS USED IN THE PAST. HE IS REQUESTING THIS BE FILLED.     Does the patient have less than a 3 day supply:  [x] Yes  [] No    Would you like a call back once the refill request has been completed: [x] Yes [] No    If the office needs to give you a call back, can they leave a voicemail: [] Yes [x] No    Syed Mathew Rep   01/06/23 11:40 EST

## 2023-02-28 ENCOUNTER — OFFICE VISIT (OUTPATIENT)
Dept: INTERNAL MEDICINE | Facility: CLINIC | Age: 73
End: 2023-02-28
Payer: MEDICARE

## 2023-02-28 VITALS
BODY MASS INDEX: 27.22 KG/M2 | TEMPERATURE: 97.8 F | SYSTOLIC BLOOD PRESSURE: 120 MMHG | DIASTOLIC BLOOD PRESSURE: 82 MMHG | HEART RATE: 73 BPM | RESPIRATION RATE: 18 BRPM | OXYGEN SATURATION: 98 % | WEIGHT: 201 LBS | HEIGHT: 72 IN

## 2023-02-28 DIAGNOSIS — E03.8 SUBCLINICAL HYPOTHYROIDISM: Chronic | ICD-10-CM

## 2023-02-28 DIAGNOSIS — Z00.00 MEDICARE ANNUAL WELLNESS VISIT, SUBSEQUENT: Primary | ICD-10-CM

## 2023-02-28 DIAGNOSIS — R73.03 PREDIABETES: ICD-10-CM

## 2023-02-28 DIAGNOSIS — E78.2 MIXED HYPERLIPIDEMIA: ICD-10-CM

## 2023-02-28 PROCEDURE — 99214 OFFICE O/P EST MOD 30 MIN: CPT | Performed by: FAMILY MEDICINE

## 2023-02-28 PROCEDURE — G0439 PPPS, SUBSEQ VISIT: HCPCS | Performed by: FAMILY MEDICINE

## 2023-02-28 PROCEDURE — 1126F AMNT PAIN NOTED NONE PRSNT: CPT | Performed by: FAMILY MEDICINE

## 2023-02-28 PROCEDURE — 1170F FXNL STATUS ASSESSED: CPT | Performed by: FAMILY MEDICINE

## 2023-02-28 PROCEDURE — 1159F MED LIST DOCD IN RCRD: CPT | Performed by: FAMILY MEDICINE

## 2023-02-28 NOTE — PROGRESS NOTES
"Chief Complaint  No chief complaint on file.    Subjective    {Problem List  Visit Diagnosis   Encounters  Notes  Medications  Labs  Result Review Imaging  Media :23}    Branden Brady presents to [facility]  History of Present Illness     He is following up today regarding his chronic medical conditions.  He has labs completed last week and we discussed them today.    HLD-  Patient taking rosuvastatin as prescribed.  Trying to adhere to a balanced diet.  Denies side effects such as myalgias.  Stable based on most recent lipid panel.  Mild liver function elevation with AST likely associated with the rosuvastatin.    He has a history of subclinical hypothyroidism and had not required any medication to this point.  Most recent TSH is slightly more elevated than last time at 4.970 with a free T4 of 0.92 which is mildly low.    Prediabetes-he is still in prediabetic range but his A1c is a little improved from last time from 5.90 down to 5.80.  He is still watching his weight and his carbohydrate intake.    Prostate cancer screening was normal    Objective   Vital Signs:  There were no vitals taken for this visit.  Estimated body mass index is 27.62 kg/m² as calculated from the following:    Height as of 11/23/22: 182.9 cm (72.01\").    Weight as of 11/23/22: 92.4 kg (203 lb 11.3 oz).       {BMI is >= 25 and <30. (Overweight) The following options were offered after discussion; (Optional):46348}      Physical Exam  Vitals and nursing note reviewed.   Constitutional:       General: He is not in acute distress.     Appearance: Normal appearance.   Cardiovascular:      Rate and Rhythm: Normal rate and regular rhythm.      Heart sounds: Normal heart sounds. No murmur heard.  Pulmonary:      Effort: Pulmonary effort is normal.      Breath sounds: Normal breath sounds.   Neurological:      Mental Status: He is alert.        Result Review :{Labs  Result Review  Imaging  Med Tab  Media "  Procedures  :23}  The following data was reviewed by: Kel Mcnally MD on 02/28/2023:  Common labs    Common Labs 8/19/22 8/19/22 8/19/22 8/19/22 10/17/22 10/17/22 2/22/23 2/22/23 2/22/23 2/22/23 2/22/23    1121 1121 1121 1121 1009 1009 0945 0945 0945 0945 0945   Glucose  110 (A)    95  107 (A)      BUN  20    24 (A)  19      Creatinine  0.99    0.99  1.01      Sodium  141    142  142      Potassium  5.1    4.9  4.9      Chloride  103    105  106      Calcium  9.7    9.4  9.4      Total Protein  6.9    6.0  6.4      Albumin  4.5    4.20  4.0      Total Bilirubin  0.7    0.6  0.6      Alkaline Phosphatase  58    52  59      AST (SGOT)  48 (A)    39  46 (A)      ALT (SGPT)  38    32  33      WBC 7.5      7.59       Hemoglobin 16.4      15.4       Hematocrit 48.8      45.3       Platelets 229      205       Total Cholesterol   181  162     159    Triglycerides   51  46     51    HDL Cholesterol   78  77 (A)     72 (A)    LDL Cholesterol    93  75     76    Hemoglobin A1C    5.9 (A)     5.80 (A)     PSA           2.000   (A) Abnormal value       Comments are available for some flowsheets but are not being displayed.           {Data reviewed (Optional):42833:::1}             Assessment and Plan {CC Problem List  Visit Diagnosis   ROS  Review (Popup)  Health Maintenance  Quality  BestPractice  Medications  SmartSets  SnapShot Encounters  Media :23}  There are no diagnoses linked to this encounter.       Clinically stable chronic conditions as above.  Continue all medications as above.  Labs reviewed/ordered as above.         {Time Spent (Optional):37350}  Follow Up {Instructions Charge Capture  Follow-up Communications :23}  No follow-ups on file.  Patient was given instructions and counseling regarding his condition or for health maintenance advice. Please see specific information pulled into the AVS if appropriate.

## 2023-02-28 NOTE — PROGRESS NOTES
The ABCs of the Annual Wellness Visit  Subsequent Medicare Wellness Visit    Subjective    Branden Brady is a 72 y.o. male who presents for a Subsequent Medicare Wellness Visit.    The following portions of the patient's history were reviewed and   updated as appropriate: allergies, current medications, past family history, past medical history, past social history, past surgical history and problem list.    Compared to one year ago, the patient feels his physical   health is the same.    Compared to one year ago, the patient feels his mental   health is the same.    Recent Hospitalizations:  He was not admitted to the hospital during the last year.       Current Medical Providers:  Patient Care Team:  Kel Mcnally MD as PCP - General (Family Medicine)  Dariana Dalton MD as PCP - Family Medicine    Outpatient Medications Prior to Visit   Medication Sig Dispense Refill   • Multiple Vitamins-Minerals (MULTIVITAMIN ADULT PO) Take  by mouth Daily.     • rosuvastatin (CRESTOR) 20 MG tablet Take 1 tablet by mouth Every Night. (Patient taking differently: Take 1.5 tablets by mouth Every Night.) 90 tablet 3   • tretinoin (RETIN-A) 0.05 % cream Every Night.  5     No facility-administered medications prior to visit.       No opioid medication identified on active medication list. I have reviewed chart for other potential  high risk medication/s and harmful drug interactions in the elderly.          Aspirin is not on active medication list.  Aspirin use is not indicated based on review of current medical condition/s. Risk of harm outweighs potential benefits.  .    Patient Active Problem List   Diagnosis   • Mixed hyperlipidemia   • Chronic bilateral low back pain with bilateral sciatica   • DDD (degenerative disc disease), lumbar   • Colon cancer screening   • Prediabetes   • Olecranon bursitis of left elbow   • Spinal stenosis of lumbar region with neurogenic claudication   • Abdominal aortic atherosclerosis (HCC)  "  • Aneurysm of ascending aorta (HCC)   • Calcific coronary arteriosclerosis   • Elevated blood pressure reading without diagnosis of hypertension   • Squamous cell carcinoma of skin of right lower extremity   • Hiatal hernia   • Subclinical hypothyroidism     Advance Care Planning  Advance Directive is not on file.  ACP discussion was held with the patient during this visit. Patient does not have an advance directive, information provided.     Objective    Vitals:    02/28/23 1043   BP: 120/82   BP Location: Left arm   Patient Position: Sitting   Cuff Size: Adult   Pulse: 73   Resp: 18   Temp: 97.8 °F (36.6 °C)   SpO2: 98%   Weight: 91.2 kg (201 lb)   Height: 182.9 cm (72\")   PainSc: 0-No pain     Estimated body mass index is 27.26 kg/m² as calculated from the following:    Height as of this encounter: 182.9 cm (72\").    Weight as of this encounter: 91.2 kg (201 lb).    BMI is >= 25 and <30. (Overweight) The following options were offered after discussion;: exercise counseling/recommendations and nutrition counseling/recommendations      Does the patient have evidence of cognitive impairment? No    Lab Results   Component Value Date    CHLPL 159 02/22/2023    TRIG 51 02/22/2023    HDL 72 (H) 02/22/2023    LDL 76 02/22/2023    VLDL 11 02/22/2023    HGBA1C 5.80 (H) 02/22/2023        HEALTH RISK ASSESSMENT    Smoking Status:  Social History     Tobacco Use   Smoking Status Never   Smokeless Tobacco Never     Alcohol Consumption:  Social History     Substance and Sexual Activity   Alcohol Use Not Currently    Comment: Socially      Fall Risk Screen:    STEADI Fall Risk Assessment was completed, and patient is at LOW risk for falls.Assessment completed on:2/28/2023    Depression Screening:  PHQ-2/PHQ-9 Depression Screening 2/28/2023   Little Interest or Pleasure in Doing Things -   Feeling Down, Depressed or Hopeless 0-->not at all   PHQ-9: Brief Depression Severity Measure Score 0       Health Habits and Functional and " Cognitive Screening:  Functional & Cognitive Status 2/28/2023   Do you have difficulty preparing food and eating? No   Do you have difficulty bathing yourself, getting dressed or grooming yourself? No   Do you have difficulty using the toilet? No   Do you have difficulty moving around from place to place? No   Do you have trouble with steps or getting out of a bed or a chair? No   Current Diet Well Balanced Diet   Dental Exam Up to date   Eye Exam Up to date   Exercise (times per week) 7 times per week   Current Exercises Include Walking   Current Exercise Activities Include -   Do you need help using the phone?  No   Are you deaf or do you have serious difficulty hearing?  No   Do you need help with transportation? No   Do you need help shopping? No   Do you need help preparing meals?  No   Do you need help with housework?  No   Do you need help with laundry? No   Do you need help taking your medications? No   Do you need help managing money? No   Do you ever drive or ride in a car without wearing a seat belt? No   Have you felt unusual stress, anger or loneliness in the last month? No   Who do you live with? Spouse   If you need help, do you have trouble finding someone available to you? No   Have you been bothered in the last four weeks by sexual problems? No   Do you have difficulty concentrating, remembering or making decisions? No       Age-appropriate Screening Schedule:  Refer to the list below for future screening recommendations based on patient's age, sex and/or medical conditions. Orders for these recommended tests are listed in the plan section. The patient has been provided with a written plan.    Health Maintenance   Topic Date Due   • ZOSTER VACCINE (2 of 3) 02/29/2016   • COVID-19 Vaccine (3 - Booster for Moderna series) 05/25/2021   • DXA SCAN  04/15/2023   • LIPID PANEL  02/22/2024   • ANNUAL WELLNESS VISIT  02/28/2024   • COLORECTAL CANCER SCREENING  09/07/2027   • TDAP/TD VACCINES (3 - Td or  Tdap) 07/12/2031   • HEPATITIS C SCREENING  Completed   • INFLUENZA VACCINE  Completed   • Pneumococcal Vaccine 65+  Completed                CMS Preventative Services Quick Reference  Risk Factors Identified During Encounter  Immunizations Discussed/Encouraged: Influenza, Prevnar 20 (Pneumococcal 20-valent conjugate), Shingrix and COVID19  The above risks/problems have been discussed with the patient.  Pertinent information has been shared with the patient in the After Visit Summary.  An After Visit Summary and PPPS were made available to the patient.    Follow Up:   Next Medicare Wellness visit to be scheduled in 1 year.       Additional E&M Note during same encounter follows:  Patient has multiple medical problems which are significant and separately identifiable that require additional work above and beyond the Medicare Wellness Visit.      Chief Complaint  Medicare Wellness-subsequent and Follow-up (HLD, hypothyroidism, prediabetes)    Subjective        HPI    He is following up today regarding his chronic medical conditions.  He has labs completed last week and we discussed them today.    HLD-  Patient taking rosuvastatin 30mg as prescribed.  Trying to adhere to a balanced diet.  Denies side effects such as myalgias.  Stable based on most recent lipid panel.  Mild liver function elevation with AST likely associated with the rosuvastatin.  Dr. Bourgeois would like LDL closer to 55 but at least under 70.    He has a history of subclinical hypothyroidism and had not required any medication to this point.  Most recent TSH is slightly more elevated than last time at 4.970 with a free T4 of 0.92 which is mildly low.    Prediabetes-he is still in prediabetic range but his A1c is a little improved from last time from 5.90 down to 5.80.  He is still watching his weight and his carbohydrate intake.    Prostate cancer screening was normal.  Discussed PSA screening guidelines.  He would like to continue screening PSAs for the  "time being.           Objective   Vital Signs:  /82 (BP Location: Left arm, Patient Position: Sitting, Cuff Size: Adult)   Pulse 73   Temp 97.8 °F (36.6 °C)   Resp 18   Ht 182.9 cm (72\")   Wt 91.2 kg (201 lb)   SpO2 98%   BMI 27.26 kg/m²     Physical Exam  Vitals and nursing note reviewed.   Constitutional:       General: He is not in acute distress.     Appearance: Normal appearance.   Cardiovascular:      Rate and Rhythm: Normal rate and regular rhythm.      Heart sounds: Normal heart sounds. No murmur heard.  Pulmonary:      Effort: Pulmonary effort is normal.      Breath sounds: Normal breath sounds.   Neurological:      Mental Status: He is alert.          The following data was reviewed by: Kel Mcnally MD on 02/28/2023:  Common labs    Common Labs 8/19/22 8/19/22 8/19/22 8/19/22 10/17/22 10/17/22 2/22/23 2/22/23 2/22/23 2/22/23 2/22/23    1121 1121 1121 1121 1009 1009 0945 0945 0945 0945 0945   Glucose  110 (A)    95  107 (A)      BUN  20    24 (A)  19      Creatinine  0.99    0.99  1.01      Sodium  141    142  142      Potassium  5.1    4.9  4.9      Chloride  103    105  106      Calcium  9.7    9.4  9.4      Total Protein  6.9    6.0  6.4      Albumin  4.5    4.20  4.0      Total Bilirubin  0.7    0.6  0.6      Alkaline Phosphatase  58    52  59      AST (SGOT)  48 (A)    39  46 (A)      ALT (SGPT)  38    32  33      WBC 7.5      7.59       Hemoglobin 16.4      15.4       Hematocrit 48.8      45.3       Platelets 229      205       Total Cholesterol   181  162     159    Triglycerides   51  46     51    HDL Cholesterol   78  77 (A)     72 (A)    LDL Cholesterol    93  75     76    Hemoglobin A1C    5.9 (A)     5.80 (A)     PSA           2.000   (A) Abnormal value       Comments are available for some flowsheets but are not being displayed.                      Assessment and Plan   Diagnoses and all orders for this visit:    1. Medicare annual wellness visit, subsequent (Primary)    2. " Mixed hyperlipidemia  -     CBC (No Diff); Future  -     Comprehensive Metabolic Panel; Future  -     Lipid Panel With LDL / HDL Ratio; Future    3. Prediabetes  -     CBC (No Diff); Future  -     Comprehensive Metabolic Panel; Future  -     Hemoglobin A1c; Future    4. Subclinical hypothyroidism  -     TSH Rfx On Abnormal To Free T4; Future      Clinically stable chronic conditions as above.  Continue statin as above.  Labs reviewed/ordered as above.  After discussing with him, we will hold on thyroid medication right now as he is not having any symptoms of hypothyroidism.  He will ask Dr. Bourgeois if he should go up to 40mg on the statin medication based on the LDL.  If so, he will let me know and I can order a repeat lipid panel for 3 months from now as well.           Follow Up   Return in about 6 months (around 8/28/2023) for Recheck.  Patient was given instructions and counseling regarding his condition or for health maintenance advice. Please see specific information pulled into the AVS if appropriate.

## 2023-06-02 ENCOUNTER — TRANSCRIBE ORDERS (OUTPATIENT)
Dept: ADMINISTRATIVE | Facility: HOSPITAL | Age: 73
End: 2023-06-02
Payer: MEDICARE

## 2023-06-02 DIAGNOSIS — I71.20 AORTIC ANEURYSM, INTRATHORACIC: Primary | ICD-10-CM

## 2023-09-27 ENCOUNTER — OFFICE VISIT (OUTPATIENT)
Dept: INTERNAL MEDICINE | Facility: CLINIC | Age: 73
End: 2023-09-27
Payer: MEDICARE

## 2023-09-27 VITALS
BODY MASS INDEX: 26.68 KG/M2 | HEART RATE: 78 BPM | HEIGHT: 72 IN | WEIGHT: 197 LBS | SYSTOLIC BLOOD PRESSURE: 138 MMHG | RESPIRATION RATE: 18 BRPM | DIASTOLIC BLOOD PRESSURE: 90 MMHG

## 2023-09-27 DIAGNOSIS — E03.8 SUBCLINICAL HYPOTHYROIDISM: Chronic | ICD-10-CM

## 2023-09-27 DIAGNOSIS — I25.10 CALCIFIC CORONARY ARTERIOSCLEROSIS: ICD-10-CM

## 2023-09-27 DIAGNOSIS — Z12.5 SCREENING FOR PROSTATE CANCER: ICD-10-CM

## 2023-09-27 DIAGNOSIS — R73.03 PREDIABETES: Chronic | ICD-10-CM

## 2023-09-27 DIAGNOSIS — E78.2 MIXED HYPERLIPIDEMIA: Primary | Chronic | ICD-10-CM

## 2023-09-27 PROCEDURE — 99214 OFFICE O/P EST MOD 30 MIN: CPT | Performed by: FAMILY MEDICINE

## 2023-09-27 PROCEDURE — 1160F RVW MEDS BY RX/DR IN RCRD: CPT | Performed by: FAMILY MEDICINE

## 2023-09-27 PROCEDURE — 1159F MED LIST DOCD IN RCRD: CPT | Performed by: FAMILY MEDICINE

## 2023-09-27 RX ORDER — ROSUVASTATIN CALCIUM 40 MG/1
40 TABLET, COATED ORAL NIGHTLY
Qty: 90 TABLET | Refills: 4 | Status: SHIPPED | OUTPATIENT
Start: 2023-09-27

## 2023-09-27 NOTE — PATIENT INSTRUCTIONS
"MyChart Tips:    MyChart is a useful part of our patient care program and is a great way for us to communicate lab results and for you to request refills.  Not all medical questions are appropriate for MyChart such as new medical issues that require taking a history, performing an exam, getting labs/studies or researching medical questions needed to be addressed in the office.    Examples of medical issues that are APPROPRIATE for MyChart:  -Follow-up on problems we have already addressed in a visit such as home testing results, blood pressure readings, glucose readings  -Questions that can be answered with a simple \"yes\" or \"no\"    Communication that is NOT APPROPRIATE for MyChart:  -MyChart is not for new problems, serious problems or urgent problems.  Urgent matters should be addressed by phone, in the office, urgent care or the ER.  -[facility] messages are not email.  Staff will check messages each weekday.  We strive for a 48-hour turnaround on messaging.    -[facility] is not for private issues.  Messages are received first by our office staff.    Please allow up to 7 business days for lab results to be sent through [facility] to you before contacting your provider.  Sometimes we are waiting for results to get back from the lab and also your provider needs time to analyze them thoroughly before making recommendations.  While the internet has great resources, it is not a substitute for interpreting lab results.    We also ask that you not send [facility] messages and telephone calls regarding the same issue simultaneously.  This slows down the process of returning your call/message and confuses staff.    Be mindful that [facility] messages and telephone calls become part of your permanent medical record.    Lastly, your provider cannot access your [facility].  It is a service which communicates with the EHR (Electronic Health Record).  Sometimes there are errors in [facility] that staff and providers cannot see and these errors " are not part of your EHR.  Vaccines and screening reminders have been incorrect in MyChart.    We appreciate your respect for the limitations and boundaries of [employer].

## 2023-09-27 NOTE — PROGRESS NOTES
"Chief Complaint  Follow-up and Hyperlipidemia    Subjective        Branden Brady presents to [facility] PRIMARY [facility]ARE  History of Present Illness    He is following up today for the conditions as below.  He completed his labs on September 12, 2023 and we went over the results.    HLD-meeting goal.  Patient taking rosuvastatin 40 mg daily as prescribed.  Trying to adhere to a balanced diet.  Goal LDL less than 70 due to history of calcific coronary arteriosclerosis.  LDL this time 68.  His AST is mildly elevated but not of concern at this time.  Likely secondary to the statin.    Prediabetes-stable with an A1c of 5.70 which is an improvement from last time.  Trying to eat a healthy diet and work on a healthy weight.      Subclinical hypothyroidism-TSH again is mildly elevated at 4.860 with a free T4 of 0.98 which is within normal limits.  No symptoms at this time.  No need for supplementation.          Objective   Vital Signs:  /90 (BP Location: Left arm, Patient Position: Sitting, Cuff Size: Small Adult)   Pulse 78   Resp 18   Ht 182.9 cm (72\")   Wt 89.4 kg (197 lb)   BMI 26.72 kg/m²   Estimated body mass index is 26.72 kg/m² as calculated from the following:    Height as of this encounter: 182.9 cm (72\").    Weight as of this encounter: 89.4 kg (197 lb).               Physical Exam  Vitals and nursing note reviewed.   Constitutional:       General: He is not in acute distress.     Appearance: Normal appearance.   Cardiovascular:      Rate and Rhythm: Normal rate and regular rhythm.      Heart sounds: Normal heart sounds. No murmur heard.  Pulmonary:      Effort: Pulmonary effort is normal.      Breath sounds: Normal breath sounds.   Neurological:      Mental Status: He is alert.      Result Review :  The following data was reviewed by: Kel Mcnally MD on 09/27/2023:  Common labs          10/17/2022    10:09 2/22/2023    09:45 9/12/2023    10:06   Common Labs   Glucose 95  107  101 "    BUN 24  19  17    Creatinine 0.99  1.01  0.86    Sodium 142  142  141    Potassium 4.9  4.9  5.1    Chloride 105  106  105    Calcium 9.4  9.4  9.8    Total Protein 6.0  6.4  6.6    Albumin 4.20  4.0  4.4    Total Bilirubin 0.6  0.6  0.8    Alkaline Phosphatase 52  59  62    AST (SGOT) 39  46  48    ALT (SGPT) 32  33  39    WBC  7.59  7.70    Hemoglobin  15.4  15.8    Hematocrit  45.3  45.7    Platelets  205  214    Total Cholesterol 162  159  143    Triglycerides 46  51  53    HDL Cholesterol 77  72  64    LDL Cholesterol  75  76  68    Hemoglobin A1C  5.80  5.70    PSA  2.000                    Assessment and Plan   Diagnoses and all orders for this visit:    1. Mixed hyperlipidemia (Primary)  -     rosuvastatin (CRESTOR) 40 MG tablet; Take 1 tablet by mouth Every Night.  Dispense: 90 tablet; Refill: 4  -     CBC (No Diff); Future  -     Comprehensive Metabolic Panel; Future  -     Lipid Panel With LDL / HDL Ratio; Future    2. Prediabetes  -     CBC (No Diff); Future  -     Comprehensive Metabolic Panel; Future  -     Hemoglobin A1c; Future    3. Subclinical hypothyroidism  -     TSH Rfx On Abnormal To Free T4; Future    4. Calcific coronary arteriosclerosis  -     rosuvastatin (CRESTOR) 40 MG tablet; Take 1 tablet by mouth Every Night.  Dispense: 90 tablet; Refill: 4  -     Comprehensive Metabolic Panel; Future  -     Lipid Panel With LDL / HDL Ratio; Future    5. Screening for prostate cancer  -     PSA Screen; Future        Clinically stable chronic conditions as above.  Continue medication as above.  Labs reviewed/ordered as above.           Follow Up   Return in about 6 months (around 4/2/2024) for Medicare Wellness.  Patient was given instructions and counseling regarding his condition or for health maintenance advice. Please see specific information pulled into the AVS if appropriate.

## 2023-10-27 ENCOUNTER — FLU SHOT (OUTPATIENT)
Dept: INTERNAL MEDICINE | Facility: CLINIC | Age: 73
End: 2023-10-27
Payer: MEDICARE

## 2023-10-27 DIAGNOSIS — Z23 NEED FOR INFLUENZA VACCINATION: Primary | ICD-10-CM

## 2023-10-27 PROCEDURE — G0008 ADMIN INFLUENZA VIRUS VAC: HCPCS | Performed by: FAMILY MEDICINE

## 2023-10-27 PROCEDURE — 90662 IIV NO PRSV INCREASED AG IM: CPT | Performed by: FAMILY MEDICINE

## 2023-11-02 ENCOUNTER — TREATMENT (OUTPATIENT)
Dept: PHYSICAL THERAPY | Facility: CLINIC | Age: 73
End: 2023-11-02
Payer: MEDICARE

## 2023-11-02 DIAGNOSIS — R29.898 LOSS OF MOVEMENT: ICD-10-CM

## 2023-11-02 DIAGNOSIS — G89.29 CHRONIC NECK PAIN: ICD-10-CM

## 2023-11-02 DIAGNOSIS — M54.42 CHRONIC BILATERAL LOW BACK PAIN WITH BILATERAL SCIATICA: Primary | ICD-10-CM

## 2023-11-02 DIAGNOSIS — M54.41 CHRONIC BILATERAL LOW BACK PAIN WITH BILATERAL SCIATICA: Primary | ICD-10-CM

## 2023-11-02 DIAGNOSIS — R53.1 STRENGTH LOSS OF: ICD-10-CM

## 2023-11-02 DIAGNOSIS — R29.3 POOR POSTURE: ICD-10-CM

## 2023-11-02 DIAGNOSIS — M54.2 CHRONIC NECK PAIN: ICD-10-CM

## 2023-11-02 DIAGNOSIS — G89.29 CHRONIC BILATERAL LOW BACK PAIN WITH BILATERAL SCIATICA: Primary | ICD-10-CM

## 2023-11-02 PROCEDURE — 97110 THERAPEUTIC EXERCISES: CPT | Performed by: PHYSICAL THERAPIST

## 2023-11-02 PROCEDURE — 97162 PT EVAL MOD COMPLEX 30 MIN: CPT | Performed by: PHYSICAL THERAPIST

## 2023-11-02 NOTE — PROGRESS NOTES
Physical Therapy Initial Evaluation and Plan of Care      Patient: Branden Brady   : 1950  Diagnosis/ICD-10 Code:  No primary diagnosis found.  Referring practitioner: Panfilo Roland MD  Date of Initial Visit: 2023  Today's Date: 2023  Patient seen for Visit count could not be calculated. Make sure you are using a visit which is associated with an episode. sessions           Subjective: Greg reports today with complaints of low back pain, pain into his legs, posteriorly, to the knees, pain in the cervical spine, pain into the R posterior shoulder girdle and intermittent in the mid back.  Greg is stiff/painful in the mornings and movement will improve that.  Peak pain occurs at 8/10 and Greg attempts to avoid known painful movements.  Bed transfers, car transfers ans sit to stand is painful.  Navigating stairs is more difficult than it was five years ago.  His sleep is disturbed the neck and R shoulder pain.    Greg's goals in physical therapy; less pain with normal ADL performance  PMH: L rotator cuff repair, in , aortic aneurysm ascending aorta, hyperlipidemia treated with statins,   SH: , semi-retired he works in  at [employer], one daughter and Greg enjoys walking, swimming, exercising,     Objective     Posterior view of the spine: R shoulder, scapula and ilium are all higher than his L side.  The R scapula is further posterior than the L indicating a slight trunk rotation to his R    Lateral view: Forward head, increased thoracic kyphosis and decreased lumbar lordosis    Lumbar spine AROM  Flexion: Minimal to moderate loss, with R rib hump  LB: B minimal to moderate loss  Extension: Moderate loss    Cervical spine AROM  Flexion: CV, craniovertebral and full were WFL  Rotation: L WNL, R minimal loss with end range pain to the R upper shoulder  Extension: Minimal loss with mild end range pain R of central lower cervical spine, he deviated somewhat to his  L    Motor control  Upper quadrant shoulder elevation: B 5/5, shoulder abduction B 5/5, forearm supination B 5/5, elbow extension, L 5/5, R 4+/5 and fatiguing, wrist UD 5/5, and finger adduction B 5/5  Lower quadrant: hip flexion B 4+ to 5/5, knee extension B 5/5, ankle dorsiflexion B 5/5, EHL, L 4/5 fatiguing, R 5/5, knee flexion L 4 to 4+/5 fatiguing, R 5/5 and hip extension B 4 to 5+/5    DTRs: biceps, triceps and brachioradialis were trace/equal B  Patellar and Achilles B trace/equal    Functional Outcome Score: YUMIKO, 19/50=38% deficit    Treatment    Therapeutic exercise  CV, craniovertebral flexion, x 10, hook lying with pillow and roll under neck  3 part KTC, hook lying, x 10  Wand flexion, hook lying x 10    NMR: verbal cues for exercise technique were given    Self care:  A HEP of CV flexion, 3 part KTC and wand flexion was issued.      Assessment & Plan       Assessment  Impairments: abnormal or restricted ROM, activity intolerance, lacks appropriate home exercise program and pain with function   Other impairment: motor control issues with R C7, L L5 and S1, poor posture, poor posture and difficulty with transfers  Functional limitations: uncomfortable because of pain   Assessment details: Branden Brady is a 73 y.o. year-old male referred to physical therapy for chronic pain related to the lumbar and cervical spine. He presents with an evolving clinical presentation.  He has comorbidities to include soft/bony tissue adaptations to postural habits and he has a scoliosis.  He had no known personal factors  that may affect his progress in the plan of care.  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 .    Prognosis: good    Goals  Plan Goals: STGs to be met in 6  weeks  1. Greg begins aquatic exercise to improve pain levels and functional movement.  2. He is independent with an aquatic exercise program.    3. Greg begins core bracing and learning its application with movement and ADLs.    LTGs to be met in 12 weeks  1. Motor control of the R C7, L L5 and S1 myotomes are not fatiguing and equal to the opposite side with MMT.   2, Greg is sleeping without neck or shoulder pain.   3. He is independent with a HEP and education for self care.  4. YUMIKO deficit is reduced below 20%.    Plan  Therapy options: will be seen for skilled therapy services  Planned modality interventions: ultrasound, TENS and dry needling  Other planned modality interventions: aquatic therapy  Planned therapy interventions: manual therapy, neuromuscular re-education, postural training, soft tissue mobilization, spinal/joint mobilization, strengthening, stretching, therapeutic activities, transfer training, home exercise program, gait training, functional ROM exercises, flexibility, body mechanics training, ADL retraining and abdominal trunk stabilization  Frequency: 1-2 x per week.  Duration in weeks: 12  Treatment plan discussed with: patient  Plan details: Greg will attend aquatic therapy on his second visit.         Timed:  Manual Therapy:         mins  60686;  Therapeutic Exercise:    8     mins  87382;     Neuromuscular Stephy:    1    mins  78288;    Therapeutic Activity:          mins  14853;     Gait Training:           mins  54434;     Ultrasound:          mins  90201;    Iontophoresis         mins 57263  Dry Needling        mins 26753/ 20561 (Self-pay)  Self care                       1 min 94931    Untimed:  Electrical Stimulation:         mins  41636 ( );  Traction:       mins  70683;   Low Eval          Mins  38888  Mod Eval          Mins  50266  High Eval                            Mins  43572    Timed Treatment:   10   mins   Total Treatment:     45   mins    PT SIGNATURE: Gigi Wick,  PT     License Number: [place] [id number]    Electronically signed by Gigi Wick PT, 11/02/23, 4:14 PM EDT    DATE TREATMENT INITIATED: 11/2/2023    Initial Certification  Certification Period: 1/31/2024  I certify that the therapy services are furnished while this patient is under my care.  The services outlined above are required by this patient, and will be reviewed every 90 days.     PHYSICIAN: Panfilo Roland MD   NPI: [id number]                                         DATE:     Please sign and return via fax to [phone] Thank you, [facility] Physical Therapy.

## 2023-11-14 ENCOUNTER — TREATMENT (OUTPATIENT)
Dept: PHYSICAL THERAPY | Facility: CLINIC | Age: 73
End: 2023-11-14
Payer: MEDICARE

## 2023-11-14 DIAGNOSIS — M54.2 CHRONIC NECK PAIN: ICD-10-CM

## 2023-11-14 DIAGNOSIS — R53.1 STRENGTH LOSS OF: ICD-10-CM

## 2023-11-14 DIAGNOSIS — G89.29 CHRONIC NECK PAIN: ICD-10-CM

## 2023-11-14 DIAGNOSIS — M54.42 CHRONIC BILATERAL LOW BACK PAIN WITH BILATERAL SCIATICA: Primary | ICD-10-CM

## 2023-11-14 DIAGNOSIS — M54.41 CHRONIC BILATERAL LOW BACK PAIN WITH BILATERAL SCIATICA: Primary | ICD-10-CM

## 2023-11-14 DIAGNOSIS — R29.898 LOSS OF MOVEMENT: ICD-10-CM

## 2023-11-14 DIAGNOSIS — R29.3 POOR POSTURE: ICD-10-CM

## 2023-11-14 DIAGNOSIS — G89.29 CHRONIC BILATERAL LOW BACK PAIN WITH BILATERAL SCIATICA: Primary | ICD-10-CM

## 2023-11-14 PROCEDURE — 97113 AQUATIC THERAPY/EXERCISES: CPT | Performed by: PHYSICAL THERAPIST

## 2023-11-14 NOTE — PROGRESS NOTES
"Physical Therapy Daily Treatment Note    [facility]  [address] [place] [address]  [phone] (phone)  [phone] (fax)    Patient: Branden Brady   : 1950  Diagnosis/ICD-10 Code:  Chronic bilateral low back pain with bilateral sciatica [M54.42, M54.41, G89.29]  Referring practitioner: Panfilo Roland MD  Date of Initial Visit: Type: THERAPY  Noted: 2023  Today's Date: 2023  Patient seen for 2 sessions             Subjective Evaluation    History of Present Illness    Subjective comment: I swim probably 50 laps (freestyle, backstroke) and really doesn't bother neck/back.  I have noticed my balance isn't as good as it used to be.  I used  to be a runner but haven't run in probably 2 years.  Getting back to running is one of my goals.       Objective     AQUATIC EX:    Swim freestyle x 1 lap , elementary backstroke x 1/2 lap   March walk  1 lap  Tandem walk  1 lap   Stretch 1   Hamstring with heel propped on step 20 sec x 2 ea  Stretch 2   -  Stretch 3   -  Stretch Other 1  -  Stretch Other 2  -  Vertical Traction  -  Abdominals   White noodle pushdowns x 15  Clams    -  Hip Abd/Add   -  Hip Flex/Ext   -  March in Place  -  Mini Squat   -  Toe/Heel Raises  -  Uni-Squat   -  Uni-Clock   -  Step Ups w/ hip/knee    10x ea, intermittent rail support, bottom pool step  Bicycle   -  Flutter/Scissor  -  Exercise 1   UE alt rows x 12 ea with yellow hydrotones, (seated on pool steps)  Exercise 2   Pot stirs x 10 ea direction w/ single yellow hydrotone (seated on pool steps)   Exercise 3   Shoulder abd/add x15 w/ white foam dumbbells  Exercise 4   UE \"L\" press downs x 10 ea way w/ white foam dumbbells  Exercise 5  KB push/pull x 15, seated on pool steps  Exercise 6  -      Assessment & Plan       Assessment  Assessment details: Patient seen today for initial aquatic therapy session including education and instruction in basic aquatic ex/activity " for mobility, flexibility, and strength/stabilization.  Aquatic therapy completed in lap pool (first ericka) due to large class occupying ~ 80% of therapy pool.  Some UE/core ex performed sitting on pool steps (2nd step from bottom) for greater stabilization challenge due to shallow depth of first ericka making ex seem easy.  Instructed him to focus on upright posture, actively engaging abdominals / gluts, and performing ex with controlled movements in comfortable range.  PT provided demonstration and cuing throughout session for optimal posture, core/glut activation, and correct form/technique with ex/activity.    Plan:  Assess response to initial aquatic session and modify/progress as appropriate.  May consider wall stretch (DKTC), tuck ups, hip abd / ext, squats, leg press, suspended bicycle, and/or trial of water jogging in future.                   Timed:  Aquatic Therapy    29     mins 37339;    Abbie Harrison, PT  Physical Therapist    [place] License: [id number]

## 2023-11-20 NOTE — PROGRESS NOTES
"Physical Therapy Daily Treatment Note    [facility]  [address] [place] [address]  [phone] (phone)  [phone] (fax)    Patient: Branden Brady   : 1950  Diagnosis/ICD-10 Code:  No primary diagnosis found.  Referring practitioner: Panfilo Roland MD  Date of Initial Visit: No linked episodes  Today's Date: 2023  Patient seen for Visit count could not be calculated. Make sure you are using a visit which is associated with an episode. sessions    Visit Diagnoses:  No diagnosis found.           Subjective     Objective   See Exercise, Manual, and Modality Logs for complete treatment.     AQUATIC EX:     Swim freestyle x 1 lap , elementary backstroke x 1/2 lap       *defer  Water waling   March walk                  1 lap *defer  Tandem walk               1 lap    *defer  Stretch 1                      Hamstring with heel propped on step 20 sec x 2 ea  Stretch 2                      -  Stretch 3                      -  Stretch Other 1           -  Stretch Other 2           -  Vertical Traction          -  Abdominals                 White noodle pushdowns x 15  Clams                          -  Hip Abd/Add                -  Hip Flex/Ext                 -  March in Place            -  Mini Squat                   -  Toe/Heel Raises         -  Uni-Squat                    -  Uni-Clock                    x8 B  Step Ups w/ hip/knee                 10x ea, intermittent rail support, bottom pool step  Bicycle                         -  Flutter/Scissor             -  Leg Press Lg ring x10 B  Exercise 1                   UE alt rows x 12 ea with yellow hydrotones, (seated on pool steps)  Exercise 2                   Pot stirs x 10 ea direction w/ single yellow hydrotone (seated on pool steps)   Exercise 3                   Shoulder abd/add x15 w/ white foam dumbbells *defer  Exercise 4                   UE \"L\" press downs x 10 ea way w/ blue foam " dumbbells  Exercise 5                   KB push/pull x 15, seated on pool steps *defer  Exercise 6                   STS 2x10    Assessment/Plan  Pt tolerated session well overall, reporting an appropriate level of muscle fatigue following strength exercises. Continued most of previous strength exercises. Performed postural/UE exercises in deep end instead of seated. Added uni clock to challenge balance, pt required 2 finger support. Continue PT POC.          Timed:  Aquatic Therapy    38     mins 78731;    Jewell Tinajero, PT  Physical Therapist    [place] License: [id number]

## 2023-11-21 ENCOUNTER — TREATMENT (OUTPATIENT)
Dept: PHYSICAL THERAPY | Facility: CLINIC | Age: 73
End: 2023-11-21
Payer: MEDICARE

## 2023-11-28 ENCOUNTER — TREATMENT (OUTPATIENT)
Dept: PHYSICAL THERAPY | Facility: CLINIC | Age: 73
End: 2023-11-28
Payer: MEDICARE

## 2023-11-28 DIAGNOSIS — R29.898 LOSS OF MOVEMENT: ICD-10-CM

## 2023-11-28 DIAGNOSIS — R53.1 STRENGTH LOSS OF: ICD-10-CM

## 2023-11-28 DIAGNOSIS — G89.29 CHRONIC NECK PAIN: ICD-10-CM

## 2023-11-28 DIAGNOSIS — M54.42 CHRONIC BILATERAL LOW BACK PAIN WITH BILATERAL SCIATICA: Primary | ICD-10-CM

## 2023-11-28 DIAGNOSIS — R29.3 POOR POSTURE: ICD-10-CM

## 2023-11-28 DIAGNOSIS — G89.29 CHRONIC BILATERAL LOW BACK PAIN WITH BILATERAL SCIATICA: Primary | ICD-10-CM

## 2023-11-28 DIAGNOSIS — M54.41 CHRONIC BILATERAL LOW BACK PAIN WITH BILATERAL SCIATICA: Primary | ICD-10-CM

## 2023-11-28 DIAGNOSIS — M54.2 CHRONIC NECK PAIN: ICD-10-CM

## 2023-11-28 PROCEDURE — 97113 AQUATIC THERAPY/EXERCISES: CPT | Performed by: PHYSICAL THERAPIST

## 2023-11-28 NOTE — PROGRESS NOTES
"Physical Therapy Daily Treatment Note    [facility]  [address] [place] [address]  [phone] (phone)  [phone] (fax)    Patient: Branden Brady   : 1950  Diagnosis/ICD-10 Code:  Chronic bilateral low back pain with bilateral sciatica [M54.42, M54.41, G89.29]  Referring practitioner: Panfilo Roland MD  Date of Initial Visit: Type: THERAPY  Noted: 2023  Today's Date: 2023  Patient seen for 3 sessions             Subjective Evaluation    History of Present Illness    Subjective comment: About the same.  I do feel like some of the ex are going to help with improving my posture.       Objective     AQUATIC EX:     Swim freestyle x 1 lap , elementary backstroke x 1/2 lap       *defer  Water walking   March walk                  1 lap *defer  Tandem walk               1 lap    *defer  Stretch 1                      Hamstring with heel propped on step 20 sec x 2 ea  Stretch 2                      piriformis 20 sec x 2 ea  Stretch 3                      -  Stretch Other 1           -  Stretch Other 2           -  Vertical Traction          -  Abdominals                 White noodle pushdowns x 15  Clams                          -  Hip Abd/Add                -  Hip Flex/Ext                 -  March in Place            -  Mini Squat                   -  Toe/Heel Raises         -  Uni-Squat                    -  Uni-Clock                    x10 B  Step Ups w/ hip/knee                 12x ea, intermittent rail support, bottom pool step  Bicycle                         suspended x 2 min  Flutter/Scissor             -  Leg Press   15x w/ Lg ring  Exercise 1                   UE alt rows x 15 ea with yellow hydrotones  Exercise 2                   Pot stirs x 10 ea direction w/ single yellow hydrotone  Exercise 3                   Shoulder abd/add x15 w/ blue foam dumbbells  Exercise 4                   UE \"L\" press downs x 10 ea way w/ blue foam " dumbbells  Exercise 5                   KB push/pull x 15  Exercise 6                   STS 2x10, 2nd set raising beach ball overhead  Exercise 7  Tuck ups:  4x, seated - challenging so switched to suspended with rail support 10x      Assessment & Plan       Assessment  Assessment details: Patient reports doing about the same.  Continued with most previous aquatic ex/activity for mobility, flexibility, and strength/stabilization.   Increased resp on some ex and added suspended tuck ups, bicycle this visit.  Attempted seated tuck ups but very difficult/challenging for him to stay seated on bench so switched to suspended with noodle/rail support which worked better.  Emphasis on standing tall, actively engaging abdominals / gluts, and performing ex with good form / quality and control to hlep improved desired muscle activation.  He denied any back pain during resisted UE/core ex but will monitor for post treatment symptoms.  Demonstration and cuing provided throughout session for optimal posture, core/glut activation, and correct form/technique with ex/activity.    Plan:  Continue to assess patient response to aquatic ex/activity and modify/progress as appropriate.  May consider hip abd / ext, hip circles, squats, oblique noodle pushdowns, and/or trial of water jogging in future.                   Timed:  Aquatic Therapy    40     mins 70748;    Abbie Harrison PT  Physical Therapist    [place] License: [id number]

## 2023-11-30 ENCOUNTER — TREATMENT (OUTPATIENT)
Dept: PHYSICAL THERAPY | Facility: CLINIC | Age: 73
End: 2023-11-30
Payer: MEDICARE

## 2023-11-30 DIAGNOSIS — G89.29 CHRONIC BILATERAL LOW BACK PAIN WITH BILATERAL SCIATICA: Primary | ICD-10-CM

## 2023-11-30 DIAGNOSIS — R29.898 LOSS OF MOVEMENT: ICD-10-CM

## 2023-11-30 DIAGNOSIS — M54.41 CHRONIC BILATERAL LOW BACK PAIN WITH BILATERAL SCIATICA: Primary | ICD-10-CM

## 2023-11-30 DIAGNOSIS — R29.3 POOR POSTURE: ICD-10-CM

## 2023-11-30 DIAGNOSIS — G89.29 CHRONIC NECK PAIN: ICD-10-CM

## 2023-11-30 DIAGNOSIS — M54.2 CHRONIC NECK PAIN: ICD-10-CM

## 2023-11-30 DIAGNOSIS — M54.42 CHRONIC BILATERAL LOW BACK PAIN WITH BILATERAL SCIATICA: Primary | ICD-10-CM

## 2023-11-30 PROCEDURE — 97113 AQUATIC THERAPY/EXERCISES: CPT | Performed by: PHYSICAL THERAPIST

## 2023-11-30 NOTE — PROGRESS NOTES
"Physical Therapy Daily Treatment Note    [facility]  [address] [place] [address]  [phone] (phone)  [phone] (fax)    Patient: Branden Brady   : 1950  Diagnosis/ICD-10 Code:  Chronic bilateral low back pain with bilateral sciatica [M54.42, M54.41, G89.29]  Referring practitioner: Panfilo Roland MD  Date of Initial Visit: Type: THERAPY  Noted: 2023  Today's Date: 2023  Patient seen for 5 sessions             Subjective     Objective     AQUATIC EX:     Swim freestyle x 1 lap , elementary backstroke x 1/2 lap       *defer  Water walking   March walk                  1 lap *defer  Tandem walk               1 lap    *defer  Stretch 1                      Hamstring with heel propped on step 20 sec x 2 ea  Stretch 2                      piriformis 20 sec x 2 ea  Stretch 3                      -  Stretch Other 1           -  Stretch Other 2           -  Vertical Traction          -  Abdominals                 White noodle pushdowns x 20  Clams                          -  Hip Abd/Add                -  Hip Flex/Ext                 -  March in Place            -  Mini Squat                   -  Toe/Heel Raises         -  Uni-Squat                    -  Uni-Clock                    x10 B  Step Ups w/ hip/knee  + UE reach                12x ea, intermittent rail support, bottom pool step  Bicycle                         suspended x 2 min  Flutter/Scissor             -  Leg Press                    15x w/ white noodle  Exercise 1                   UE alt rows x 15 ea with yellow hydrotones  Exercise 2                   Pot stirs x 15 ea direction w/ single yellow hydrotone  Exercise 3                   Shoulder abd/add x15 w/ blue foam dumbbells  Exercise 4                   UE \"L\" press downs x 10 ea way w/ blue foam dumbbells  Exercise 5                   KB push/pull x 20  Exercise 6                   STS 3x10 1st set rising up on tip " toes, 2nd set rising up on toes + raising beach ball overhead, 3rd set w/ alt knee kift x 10 ea  Exercise 7   1 legged STS x 4 ea   Exercise 8                   Tuck ups:  suspended with rail support 15x  Exercise 9  Alternating Hz abduction x 10 ea with black aqualogix  Exercise 10  Plank (hands on pool bench) with alt hip ext x 10 ea  Exercise 11  Side Plank (1 hand on pool bench) x 20 sec ea  Exercise 12  Mountain Climbers (hands on pool bench, neutral cervical spine) 2 x 30 sec    Assessment & Plan       Assessment  Assessment details: Patient reports no issue after last time.  Continued with most previous aquatic ex/activity for mobility, flexibility, and strength/stabilization.  Increased reps on a few  ex, added mountain climber, plank with alt hip ext, side plank, and tried different variations on STS as well as addition of UE reach with step ups for increased balance challenge this visit.  Trial of UE reach on step ups, STS w/ OH ball raise, one legged STS, and side plank were more challenging for him.  He did seem to have more difficulty w/ balance / stability when WB on L LE compared to R LE during ex but was a challenge bilaterally.  Inctructed him to try and maintain neutral neutral spine during plank ex, mountain climbers (especially cervical spine) and avoid overextended UEs on shoulder Hz abd ex.  Focus on upright posture, actively engaging abdominals / gluts, and performing ex with good form / quality and control to help facilitate desired muscle activation and minimize compensation / strain.  Demonstration and cuing provided throughout session for optimal posture, core/glut activation, and correct form/technique with ex/activity.  Discussed option of aquatic classes and will provide printed copy of aquatic HEP so that he can continue with aquatic ex on his on once he transitions to land therapy in a few weeks.     Plan:  Continue with aquatic ex/activity progressing as appropriate.  May consider  hip abd / ext, hip circles, oblique noodle pushdowns, and/or trial of water jogging in future.                   Timed:  Aquatic Therapy    54     mins 13513;    Abbie Harrison PT  Physical Therapist    [place] License: [id number]

## 2023-12-01 ENCOUNTER — TELEPHONE (OUTPATIENT)
Dept: PHYSICAL THERAPY | Facility: CLINIC | Age: 73
End: 2023-12-01
Payer: MEDICARE

## 2023-12-01 NOTE — TELEPHONE ENCOUNTER
Caller: Branden Brady    Relationship: Self    Best call back number:[phone]         What was the call regarding: WOULD LIKE A CALL FROM Celina ABOUT FUTURE PT

## 2023-12-05 ENCOUNTER — TREATMENT (OUTPATIENT)
Dept: PHYSICAL THERAPY | Facility: CLINIC | Age: 73
End: 2023-12-05
Payer: MEDICARE

## 2023-12-05 DIAGNOSIS — M54.41 CHRONIC BILATERAL LOW BACK PAIN WITH BILATERAL SCIATICA: Primary | ICD-10-CM

## 2023-12-05 DIAGNOSIS — G89.29 CHRONIC NECK PAIN: ICD-10-CM

## 2023-12-05 DIAGNOSIS — M54.42 CHRONIC BILATERAL LOW BACK PAIN WITH BILATERAL SCIATICA: Primary | ICD-10-CM

## 2023-12-05 DIAGNOSIS — R29.3 POOR POSTURE: ICD-10-CM

## 2023-12-05 DIAGNOSIS — G89.29 CHRONIC BILATERAL LOW BACK PAIN WITH BILATERAL SCIATICA: Primary | ICD-10-CM

## 2023-12-05 DIAGNOSIS — R29.898 LOSS OF MOVEMENT: ICD-10-CM

## 2023-12-05 DIAGNOSIS — M54.2 CHRONIC NECK PAIN: ICD-10-CM

## 2023-12-05 DIAGNOSIS — R53.1 STRENGTH LOSS OF: ICD-10-CM

## 2023-12-05 PROCEDURE — 97113 AQUATIC THERAPY/EXERCISES: CPT | Performed by: PHYSICAL THERAPIST

## 2023-12-05 NOTE — PROGRESS NOTES
Physical Therapy 30-Day Progress Note     [facility]  [address] [place] [address]  [phone] (phone)  [phone] (fax)    Patient: Branden Brady   : 1950  Diagnosis/ICD-10 Code:  Chronic bilateral low back pain with bilateral sciatica [M54.42, M54.41, G89.29]  Referring practitioner: Panfilo Roland MD  Date of Initial Visit: Type: THERAPY  Noted: 2023  Today's Date: 2023  Patient seen for 6 sessions      Subjective:     Clinical Progress:  slightly improved  Home Program Compliance: Yes  Treatment has included:  aquatic therapy    Subjective Evaluation    History of Present Illness    Subjective comment: Overall pain maybe slightly better.  Feel like I can stand up straighter.  Sitting for a long time (30 min) - notes stiffness with pain when getting up / starting to move again     Objective     Lumbar AROM:  Flexion:  60%  Extension: 40%  Lateral Flexion:  50-60% B     Cervical AROM  Flexion:  WFL  Rotation:  L WNL, R 75% with pain on end range pain  Extension:  65% with mild end range pain   **More painful w/ R rotation than extension     UE MMT:    Shoulder flexion:  5/5  B  Shoulder abduction:  5/5 B  Elbow flexion:  5/5 B  Elbow extension, L 5/5, R 4+/5     LE MMT:    Hip flexion:  B 4+/5 to 5/5 B  Hip abduction (standing poolside with UE support):  grossly 4/5 to 4+/5 B, noted flexion/ER compensation B  Hip extension (standing poolside with UE support):  grossly 4+/5   Knee flexion:  L 4+/5 fatiguing, R 5/5  Knee extension:  5/5 B  Ankle dorsiflexion:  5/5 B  EHL:  L 4 to 4+/5 fatiguing, R 5/5     Functional Outcome Score:  YUMIKO, 16/50=32% deficit (was 38% at eval)      AQUATIC EX:    Swim freestyle x 1 lap , elementary backstroke x 1/2 lap       *defer  Water walking   -  March walk                  1 lap *defer  Tandem walk               1 lap    *defer  Stretch 1                      Hamstring with heel propped on step 20 sec x 2  "ea  Stretch 2                      piriformis 20 sec x 2 ea  Stretch 3                      -  Stretch Other 1           -  Stretch Other 2           -  Vertical Traction          -  Abdominals                 White noodle pushdowns x 20  Clams                          -  Hip Abd/Add                -  Hip Flex/Ext                 -  March in Place            -  Mini Squat                   -  Toe/Heel Raises         -  Uni-Squat                    -  Uni-Clock                    x10 B  Step Ups w/ hip/knee  + UE reach                12x ea, intermittent rail support, bottom pool step  Bicycle                         suspended x 2 min - on own  Flutter/Scissor             -  Leg Press                    15x w/ white noodle  Exercise 1                   UE alt rows x 15 ea with yellow hydrotones  Exercise 2                   Pot stirs x 15 ea direction w/ single yellow hydrotone  Exercise 3                   Shoulder abd/add x15 w/ blue foam dumbbells  Exercise 4                   UE \"L\" press downs x 10 ea way w/ blue foam dumbbells  Exercise 5                   KB push/pull x 20  Exercise 6                   STS 2x10 1st set rising up on tip toes, 2nd set rising up on toes + raising beach ball overhead  Exercise 7                   1 legged STS x 5 ea   Exercise 8                   Tuck ups:  suspended with rail support 15x  Exercise 9                   Alternating Hz abduction x 10 ea with black aqualogix  Exercise 10  Open books x 10 ea arm with green aqualogix (anchoring opposite hand at rail)  Exercise 11                 Plank (hands on pool bench) with alt hip ext x 10 ea  Exercise 12                 Side Plank (1 hand on pool bench) x 30 sec ea  Exercise 13                 Mountain Climbers (hands on pool bench, neutral cervical spine) 2 x 30 sec      Assessment & Plan       Assessment  Assessment details: Branden Brady is a 73 y.o. M who has attended aquatic therapy sessions since his evaluation on " 11/2/2023 for treatment of chronic pain related to the lumbar / cervical spine.  He has comorbidities / personal factors including scoliosis, stenosis, DDD, remote hx of T9 compression fracture, arthritis, and bony / soft tissue adaptations to postural habits.  Signs and symptoms are consistent with physical therapy diagnosis of chronic lumbar spine pain with bilateral sciatica, chronic neck pain with related R shoulder pain, poor posture, loss of movement, strength loss of, difficulty with transfers and he will require education for self-care.      Goals  Plan Goals: STGs to be met in 6 weeks  1. Greg begins aquatic exercise to improve pain levels and functional movement.  Partially Met, has begun aquatic exercise but has not noted any reduction in pain levels or specific improvement in functional movement thus far.  He has noted improved ability to stand up straighter / with better posture.    2. He is independent with an aquatic exercise program.  Ongoing  3. Greg begins core bracing and learning its application with movement and ADLs.  Ongoing      LTGs to be met in 12 weeks  1. Motor control of the R C7, L L5 and S1 myotomes are not fatiguing and equal to the opposite side with MMT.  Ongoing  2, Greg is sleeping without neck or shoulder pain.  Ongoing, continues to have neck/shoulder pain affecting sleep  3. He is independent with a HEP and education for self care.  Ongoing  4. YUMIKO deficit is reduced below 20%.  Ongoing, Oswestry score today = 16/50 or 32% deficit (was 38% at eval)              Recommendations: Continue as planned  Timeframe: 1 month  Prognosis to achieve goals: good    PT Signature: Abbie Harrison, PT  [place] License:  [id number]      Based upon review of the patient's progress and continued therapy plan, it is my medical opinion that Branden Brady should continue physical therapy treatment at [facility]  [address] [place]  [address]  [phone].    Signature: __________________________________  Panfilo Roland MD  NPI: [id number]                                          Timed:  Aquatic therapy  46586    58   mins

## 2023-12-14 ENCOUNTER — OFFICE VISIT (OUTPATIENT)
Dept: INTERNAL MEDICINE | Facility: CLINIC | Age: 73
End: 2023-12-14
Payer: MEDICARE

## 2023-12-14 ENCOUNTER — HOSPITAL ENCOUNTER (OUTPATIENT)
Facility: HOSPITAL | Age: 73
Discharge: HOME | End: 2023-12-14
Payer: MEDICARE

## 2023-12-14 VITALS
WEIGHT: 200.2 LBS | DIASTOLIC BLOOD PRESSURE: 89 MMHG | HEART RATE: 74 BPM | SYSTOLIC BLOOD PRESSURE: 133 MMHG | HEIGHT: 72 IN | OXYGEN SATURATION: 97 % | BODY MASS INDEX: 27.12 KG/M2 | TEMPERATURE: 98 F

## 2023-12-14 DIAGNOSIS — R10.11 ABDOMINAL PAIN, RUQ (RIGHT UPPER QUADRANT): Primary | ICD-10-CM

## 2023-12-14 DIAGNOSIS — B02.9 HERPES ZOSTER WITHOUT COMPLICATION: Primary | ICD-10-CM

## 2023-12-14 DIAGNOSIS — R10.11 ABDOMINAL PAIN, RUQ (RIGHT UPPER QUADRANT): ICD-10-CM

## 2023-12-14 LAB
ALBUMIN SERPL-MCNC: 4.3 G/DL (ref 3.5–5.2)
ALBUMIN/GLOB SERPL: 2 G/DL
ALP SERPL-CCNC: 67 U/L (ref 39–117)
ALT SERPL-CCNC: 30 U/L (ref 1–41)
AST SERPL-CCNC: 37 U/L (ref 1–40)
BASOPHILS # BLD AUTO: 0.04 10*3/MM3 (ref 0–0.2)
BASOPHILS NFR BLD AUTO: 0.5 % (ref 0–1.5)
BILIRUB SERPL-MCNC: 0.5 MG/DL (ref 0–1.2)
BUN SERPL-MCNC: 22 MG/DL (ref 8–23)
BUN/CREAT SERPL: 25.9 (ref 7–25)
CALCIUM SERPL-MCNC: 9.5 MG/DL (ref 8.6–10.5)
CHLORIDE SERPL-SCNC: 104 MMOL/L (ref 98–107)
CO2 SERPL-SCNC: 26.7 MMOL/L (ref 22–29)
CREAT SERPL-MCNC: 0.85 MG/DL (ref 0.76–1.27)
EGFRCR SERPLBLD CKD-EPI 2021: 92.9 ML/MIN/1.73
EOSINOPHIL # BLD AUTO: 0.2 10*3/MM3 (ref 0–0.4)
EOSINOPHIL NFR BLD AUTO: 2.6 % (ref 0.3–6.2)
ERYTHROCYTE [DISTWIDTH] IN BLOOD BY AUTOMATED COUNT: 13.3 % (ref 12.3–15.4)
GLOBULIN SER CALC-MCNC: 2.2 GM/DL
GLUCOSE SERPL-MCNC: 99 MG/DL (ref 65–99)
HCT VFR BLD AUTO: 43.9 % (ref 37.5–51)
HGB BLD-MCNC: 15 G/DL (ref 13–17.7)
IMM GRANULOCYTES # BLD AUTO: 0.02 10*3/MM3 (ref 0–0.05)
IMM GRANULOCYTES NFR BLD AUTO: 0.3 % (ref 0–0.5)
LYMPHOCYTES # BLD AUTO: 2.59 10*3/MM3 (ref 0.7–3.1)
LYMPHOCYTES NFR BLD AUTO: 33.4 % (ref 19.6–45.3)
MCH RBC QN AUTO: 32.1 PG (ref 26.6–33)
MCHC RBC AUTO-ENTMCNC: 34.2 G/DL (ref 31.5–35.7)
MCV RBC AUTO: 93.8 FL (ref 79–97)
MONOCYTES # BLD AUTO: 0.62 10*3/MM3 (ref 0.1–0.9)
MONOCYTES NFR BLD AUTO: 8 % (ref 5–12)
NEUTROPHILS # BLD AUTO: 4.29 10*3/MM3 (ref 1.7–7)
NEUTROPHILS NFR BLD AUTO: 55.2 % (ref 42.7–76)
NRBC BLD AUTO-RTO: 0 /100 WBC (ref 0–0.2)
PLATELET # BLD AUTO: 225 10*3/MM3 (ref 140–450)
POTASSIUM SERPL-SCNC: 4.8 MMOL/L (ref 3.5–5.2)
PROT SERPL-MCNC: 6.5 G/DL (ref 6–8.5)
RBC # BLD AUTO: 4.68 10*6/MM3 (ref 4.14–5.8)
SODIUM SERPL-SCNC: 141 MMOL/L (ref 136–145)
WBC # BLD AUTO: 7.76 10*3/MM3 (ref 3.4–10.8)

## 2023-12-14 PROCEDURE — 74150 CT ABDOMEN W/O CONTRAST: CPT

## 2023-12-14 RX ORDER — CEPHALEXIN 250 MG/1
250 CAPSULE ORAL 4 TIMES DAILY
COMMUNITY

## 2023-12-14 RX ORDER — VALACYCLOVIR HYDROCHLORIDE 1 G/1
1000 TABLET, FILM COATED ORAL 2 TIMES DAILY
Qty: 20 TABLET | Refills: 0 | Status: SHIPPED | OUTPATIENT
Start: 2023-12-14

## 2023-12-14 NOTE — PROGRESS NOTES
"Chief Complaint  Flank Pain    Subjective        Branden Brady presents to [facility]  History of Present Illness  Patient reports pain began last night.The pain is not related to movement, is extremely tender to touch, recently had a basal cell carcinoma removed to left side of chest, has recently been laying on the right side which could have aggravated a muscle. Took a tylenol last night for pain with no relief. Denies urinary complications, no burning, frequency or urgency. Urine is straw colored.   Patient has history or AAA that is stable and monitored yearly.       Abdominal Pain  This is a new problem. The current episode started yesterday. The onset quality is sudden. The problem occurs intermittently. The problem has been unchanged. The pain is located in the RUQ and generalized abdominal region. The pain is at a severity of 9/10. The pain is severe. The quality of the pain is sharp. The abdominal pain does not radiate. Pertinent negatives include no belching, constipation, diarrhea, dysuria, fever, headaches, hematuria, nausea or vomiting. The pain is aggravated by movement (touch). The pain is relieved by Nothing. He has tried acetaminophen for the symptoms. The treatment provided no relief.       Objective   Vital Signs:  /89 (BP Location: Left arm, Patient Position: Sitting, Cuff Size: Adult)   Pulse 74   Temp 98 °F (36.7 °C) (Oral)   Ht 182.9 cm (72\")   Wt 90.8 kg (200 lb 3.2 oz)   SpO2 97%   BMI 27.15 kg/m²   Estimated body mass index is 27.15 kg/m² as calculated from the following:    Height as of this encounter: 182.9 cm (72\").    Weight as of this encounter: 90.8 kg (200 lb 3.2 oz).               Physical Exam  Constitutional:       General: He is not in acute distress.     Appearance: Normal appearance. He is normal weight. He is not ill-appearing.   Cardiovascular:      Rate and Rhythm: Normal rate and regular rhythm.   Pulmonary:      Effort: " Pulmonary effort is normal.      Breath sounds: Normal breath sounds.   Abdominal:      General: Abdomen is flat. Bowel sounds are normal.      Palpations: Abdomen is soft. There is no mass or pulsatile mass.      Tenderness: There is abdominal tenderness in the right upper quadrant. There is guarding. There is no right CVA tenderness or left CVA tenderness.      Hernia: No hernia is present. There is no hernia in the umbilical area.          Comments: Pain 8-9/10 with palpation to RUQ   Skin:     General: Skin is warm.      Capillary Refill: Capillary refill takes less than 2 seconds.   Neurological:      Mental Status: He is alert.        Result Review :                   Assessment and Plan   Diagnoses and all orders for this visit:    1. Abdominal pain, RUQ (right upper quadrant) (Primary)  Comments:  -     STAT CT Abdomen Without Contrast scheduled at checkout  Orders:  -     CT Abdomen Without Contrast; Future  -     CBC & Differential  -     Comprehensive metabolic panel             Follow Up   No follow-ups on file. Stat CT Abdomen to be preformed today. Pending results, will notify patient. Labs ordered and drawn at check out.   Patient was given instructions and counseling regarding his condition. Please see specific information pulled into the AVS if appropriate.

## 2023-12-14 NOTE — PROGRESS NOTES
Spoke with patient via telephone regarding results from CT of the abdomen.  Discussed findings and results.  Reviewed cause for concern for possible shingles, awaiting lab results per patient request to initiate treatment.  Valacyclovir called into pharmacy, once labs resulted I will call patient personally via telephone and initiate therapy.  Patient satisfied with this plan of action.

## 2024-01-04 ENCOUNTER — TREATMENT (OUTPATIENT)
Dept: PHYSICAL THERAPY | Facility: CLINIC | Age: 74
End: 2024-01-04
Payer: MEDICARE

## 2024-01-04 DIAGNOSIS — R53.1 STRENGTH LOSS OF: ICD-10-CM

## 2024-01-04 DIAGNOSIS — R29.898 LOSS OF MOVEMENT: ICD-10-CM

## 2024-01-04 DIAGNOSIS — R29.3 POOR POSTURE: ICD-10-CM

## 2024-01-04 DIAGNOSIS — G89.29 CHRONIC BILATERAL LOW BACK PAIN WITH BILATERAL SCIATICA: Primary | ICD-10-CM

## 2024-01-04 DIAGNOSIS — G89.29 CHRONIC NECK PAIN: ICD-10-CM

## 2024-01-04 DIAGNOSIS — M54.41 CHRONIC BILATERAL LOW BACK PAIN WITH BILATERAL SCIATICA: Primary | ICD-10-CM

## 2024-01-04 DIAGNOSIS — M54.2 CHRONIC NECK PAIN: ICD-10-CM

## 2024-01-04 DIAGNOSIS — M54.42 CHRONIC BILATERAL LOW BACK PAIN WITH BILATERAL SCIATICA: Primary | ICD-10-CM

## 2024-01-04 PROCEDURE — 97140 MANUAL THERAPY 1/> REGIONS: CPT | Performed by: PHYSICAL THERAPIST

## 2024-01-04 PROCEDURE — 97110 THERAPEUTIC EXERCISES: CPT | Performed by: PHYSICAL THERAPIST

## 2024-01-04 PROCEDURE — 97112 NEUROMUSCULAR REEDUCATION: CPT | Performed by: PHYSICAL THERAPIST

## 2024-01-04 NOTE — PROGRESS NOTES
Physical Therapy Daily Treatment Note    [facility]  [address] [place] [address]  [phone] (phone)  [phone] (fax)    Patient: Branden Brady   : 1950  Diagnosis/ICD-10 Code:  Chronic bilateral low back pain with bilateral sciatica [M54.42, M54.41, G89.29]  Referring practitioner: Panfilo Roland MD  Today's Date: 2024  Patient seen for 7 sessions    Visit Diagnoses:    ICD-10-CM ICD-9-CM   1. Chronic bilateral low back pain with bilateral sciatica  M54.42 724.2    M54.41 724.3    G89.29 338.29   2. Chronic neck pain  M54.2 723.1    G89.29 338.29   3. Poor posture  R29.3 781.92   4. Loss of movement  R29.898 344.9   5. Strength loss of  R53.1 780.79              Subjective: Greg reports that he did notice benefit from learning things that he can do exercise wise in warm water.      Objective     Treatment    Manual therapy: In supine, legs on bolster, cervical spine decompression, lumbar spine decompression, passive KTC x 10, B, passive SLRs, x 10, B, sciatic nerve gliding x 10, B    Therapeutic exercise  3 part KTC stretch, x 5  CV flexion x 20, hook lying with pillow and neck roll  Wand flexion x 10  Sciatic nerve gliding, hook lying, x 10, B    NMR: verbal cues for exercise included technique with the CV flexion, wand flexion and all the parts of the sciatic nerve gliding to include intensity.    Self care: I added sciatic nerve gliding to his HEP    Assessment & Plan       Assessment  Assessment details: Greg was doing a full cervical spine flexion and needed further education with the CV flexion, technique and purpose.  He was able to progress his HEP with the sciatic nerve gliding  He demonstrated a standing hamstring stretch and I believe he needs to be shown a seated version and learn to hinge his hips.    Plan  Plan details: Continue per treatment plan.                Timed:    Manual Therapy:    15     mins  68744;  Therapeutic  Exercise:    20    mins  34752;     Neuromuscular Stephy:    8    mins  31840;    Therapeutic Activity:          mins  28662;     Gait Training:           mins  49123;     Ultrasound:          mins  60721;    Aquatic Therapy         mins 29398;  Self Care                      2      mins   89060        Untimed:  Electrical Stimulation:         mins  53941 ( );  Traction:         mins  83089;   Dry Needling  (1-2 muscles)                 mins 44815 (Self-pay)  Dry Needling (3-4 muscles)      20561 (Self-pay)  Dry Needling Trial         DRYNDLTRIAL  (No Charge)    Timed Treatment:   45   mins   Total Treatment:     45   mins    Gigi Wick PT  Physical Therapist    [place] License:[id number]

## 2024-01-08 ENCOUNTER — TREATMENT (OUTPATIENT)
Dept: PHYSICAL THERAPY | Facility: CLINIC | Age: 74
End: 2024-01-08
Payer: MEDICARE

## 2024-01-08 DIAGNOSIS — G89.29 CHRONIC BILATERAL LOW BACK PAIN WITH BILATERAL SCIATICA: Primary | ICD-10-CM

## 2024-01-08 DIAGNOSIS — M54.2 CHRONIC NECK PAIN: ICD-10-CM

## 2024-01-08 DIAGNOSIS — R29.898 LOSS OF MOVEMENT: ICD-10-CM

## 2024-01-08 DIAGNOSIS — M54.41 CHRONIC BILATERAL LOW BACK PAIN WITH BILATERAL SCIATICA: Primary | ICD-10-CM

## 2024-01-08 DIAGNOSIS — M54.42 CHRONIC BILATERAL LOW BACK PAIN WITH BILATERAL SCIATICA: Primary | ICD-10-CM

## 2024-01-08 DIAGNOSIS — G89.29 CHRONIC NECK PAIN: ICD-10-CM

## 2024-01-08 DIAGNOSIS — R29.3 POOR POSTURE: ICD-10-CM

## 2024-01-08 DIAGNOSIS — R53.1 STRENGTH LOSS OF: ICD-10-CM

## 2024-01-08 PROCEDURE — 97140 MANUAL THERAPY 1/> REGIONS: CPT | Performed by: PHYSICAL THERAPIST

## 2024-01-08 PROCEDURE — 97110 THERAPEUTIC EXERCISES: CPT | Performed by: PHYSICAL THERAPIST

## 2024-01-08 NOTE — PROGRESS NOTES
Physical Therapy Daily Treatment Note    [facility]  [address] [place] [address]  [phone] (phone)  [phone] (fax)    Patient: Branden Brady   : 1950  Diagnosis/ICD-10 Code:  Chronic bilateral low back pain with bilateral sciatica [M54.42, M54.41, G89.29]  Referring practitioner: Panfilo Roland MD  Today's Date: 2024  Patient seen for 8 sessions    Visit Diagnoses:    ICD-10-CM ICD-9-CM   1. Chronic bilateral low back pain with bilateral sciatica  M54.42 724.2    M54.41 724.3    G89.29 338.29   2. Chronic neck pain  M54.2 723.1    G89.29 338.29   3. Poor posture  R29.3 781.92   4. Loss of movement  R29.898 344.9   5. Strength loss of  R53.1 780.79              Subjective: Greg stated that he noticed some release of tissue near the connection of the posterior skull and upper cervical spine after last treatment.     Objective     Treatment     Manual therapy: In supine, legs on bolster, cervical spine decompression, sub-occipital release,lumbar spine decompression, passive KTC x 10, B, passive SLRs, x 10, B, sciatic nerve gliding x 10, B     Therapeutic exercise  CV flexion x 20, hook lying, with pillow and roll under neck  3 part KTC, x 5  Sciatic nerve gliding x 10, B  Seated hamstring stretch, 30s x 2, B    NMR: verbal cues for exercise technique included keeping the thigh passive, supported by the hands, actively extend the knee until a light stretch is felt in the posterior leg and than to do an ankle pump movement causing a little discomfort in the dorsiflexed position. With the seated hamstring stretch, to keep the spine neutral and hinge the hips into flexion to stretch the hamstrings.      Self care: I added the seated hamstring stretch to Greg's HEP    Assessment & Plan       Assessment  Assessment details: Greg had been doing a standing hamstring stretch with too, much trunk flexion during the activity. That in itself is not  ergonomic, but combined with his excessive kyphosis in the thoracic spine, makes the stretch technique especially important.  He showed good technique      Plan  Plan details: Continue per treatment plan, and consider progressing exercises to improve the scapular muscles next visit.                Timed:    Manual Therapy:    25     mins  47505;  Therapeutic Exercise:    15     mins  68158;     Neuromuscular Stephy:    5    mins  35985;    Therapeutic Activity:          mins  07937;     Gait Training:           mins  70832;     Ultrasound:          mins  77893;    Aquatic Therapy         mins 66190;  Self Care                            mins   35518        Untimed:  Electrical Stimulation:         mins  88312 ( );  Traction:         mins  04996;   Dry Needling  (1-2 muscles)                 mins 61043 (Self-pay)  Dry Needling (3-4 muscles)      20561 (Self-pay)  Dry Needling Trial         DRYNDLTRIAL  (No Charge)    Timed Treatment:   45   mins   Total Treatment:     45   mins    Gigi Wick PT  Physical Therapist    [place] License:[id number]

## 2024-01-11 ENCOUNTER — TREATMENT (OUTPATIENT)
Dept: PHYSICAL THERAPY | Facility: CLINIC | Age: 74
End: 2024-01-11
Payer: MEDICARE

## 2024-01-11 NOTE — PROGRESS NOTES
Physical Therapy Daily Treatment Note    [facility]  [address] [place] [address]  [phone] (phone)  [phone] (fax)    Patient: Branden Brady   : 1950  Diagnosis/ICD-10 Code:  No primary diagnosis found.  Referring practitioner: Panfilo Roland MD  Today's Date: 2024  Patient seen for Visit count could not be calculated. Make sure you are using a visit which is associated with an episode. sessions    Visit Diagnoses:  No diagnosis found.           Subjective: Greg stated that he is observing what he should be doing to be more erect, especially after his sessions in physical therapy.     Objective     Therapeutic exercise  Standing row, using cable column, 15, 17.5, and 20 lbs., each x 10, using a neutral   Seated Matrix lat pull down, using supinated , 40, 45 and 50 lbs., each x 10    Manual therapy: In supine, legs on bolster, cervical spine decompression, sub-occipital release,lumbar spine decompression, passive KTC x 10, B, passive SLRs, x 10, B, sciatic nerve gliding x 10, B     NMR: verbal cues for exercise included standing erect, wider stance, setting the core and retracting the scapula to move the arms with the rowing, with the seated lat pull to use a symmetrical , palms up, start at the lengthened position and pull the bar to the sternum.  He was cued to use eccentric control with each exercise.     Self care:  I asked Greg to investigate Saunder's traction units for the cervical and lumbar spine.     Assessment & Plan       Assessment  Assessment details: Greg is learinng self care strategies and tolerated today's treatment well.  He is voicing to me that he is becoming aware of his posture as well.     Plan  Plan details: Continue per treatment plan.                Timed:    Manual Therapy:    20     mins  59864;  Therapeutic Exercise:    10     mins  52751;     Neuromuscular Stephy:    8    mins  90620;    Therapeutic  Activity:          mins  16951;     Gait Training:           mins  20115;     Ultrasound:          mins  59352;    Aquatic Therapy         mins 73280;  Self Care                       2     mins   45493        Untimed:  Electrical Stimulation:         mins  78982 ( );  Traction:         mins  97086;   Dry Needling  (1-2 muscles)                 mins 20560 (Self-pay)  Dry Needling (3-4 muscles)      20561 (Self-pay)  Dry Needling Trial         DRYNDLTRIAL  (No Charge)    Timed Treatment:   40   mins   Total Treatment:     40   mins    Gigi Wick PT  Physical Therapist    [place] License:[id number]

## 2024-01-23 ENCOUNTER — TREATMENT (OUTPATIENT)
Dept: PHYSICAL THERAPY | Facility: CLINIC | Age: 74
End: 2024-01-23
Payer: MEDICARE

## 2024-01-23 DIAGNOSIS — R29.3 POOR POSTURE: ICD-10-CM

## 2024-01-23 DIAGNOSIS — R29.898 LOSS OF MOVEMENT: ICD-10-CM

## 2024-01-23 DIAGNOSIS — M54.2 CHRONIC NECK PAIN: ICD-10-CM

## 2024-01-23 DIAGNOSIS — M54.42 CHRONIC BILATERAL LOW BACK PAIN WITH BILATERAL SCIATICA: Primary | ICD-10-CM

## 2024-01-23 DIAGNOSIS — G89.29 CHRONIC NECK PAIN: ICD-10-CM

## 2024-01-23 DIAGNOSIS — G89.29 CHRONIC BILATERAL LOW BACK PAIN WITH BILATERAL SCIATICA: Primary | ICD-10-CM

## 2024-01-23 DIAGNOSIS — M54.41 CHRONIC BILATERAL LOW BACK PAIN WITH BILATERAL SCIATICA: Primary | ICD-10-CM

## 2024-01-23 DIAGNOSIS — R53.1 STRENGTH LOSS OF: ICD-10-CM

## 2024-01-23 PROCEDURE — 97113 AQUATIC THERAPY/EXERCISES: CPT | Performed by: PHYSICAL THERAPIST

## 2024-01-23 NOTE — PROGRESS NOTES
Physical Therapy 60-Day Progress Note     [facility]  [address] [place] [address]  [phone] (phone)  [phone] (fax)    Patient: Branden Brady   : 1950  Diagnosis/ICD-10 Code:  Chronic bilateral low back pain with bilateral sciatica [M54.42, M54.41, G89.29]  Referring practitioner: Panfilo Roland MD  Date of Initial Visit: Type: THERAPY  Noted: 2023  Today's Date: 2024  Patient seen for 9 sessions      Subjective:     Clinical Progress: slight improvement in ROM/strength, pain relatively unchanged  Home Program Compliance:  Yes with land HEP, also doing some swimming on his own  Treatment has included:  therapeutic exercise, manual therapy, therapeutic activity, neuro-muscular retraining , aquatic therapy, and patient education with home exercise program     Subjective Evaluation    History of Present Illness    Subjective comment: Always feel good when I leave therapy but only last for maybe a couple of hours.  I did have some R shoulder pain / aching deep for about a week that was enough to wake from sleeping but seems to have subsided      Objective     Lumbar AROM:  Flexion:  to proximal/mid shins  Extension: 40%  Lateral Flexion:  60% B     Cervical AROM  Flexion:  WFL  Rotation:  WFL B  Extension:  65% with mild end range discomfort     UE MMT:    Shoulder flexion:  5/5  B  Shoulder abduction:  5/5 B  Elbow flexion:  5/5 B  Elbow extension, L 5/5, R 4+/5      LE MMT:    Hip flexion:  5-/5 to 5/5 B  Hip abduction (standing poolside with UE support):  L grossly 4+/5, R grossly 4/5 to 4+/5, noted flexion/ER compensation B  Hip extension (standing poolside with UE support):  grossly 4+/5, R mildly weaker   Knee flexion:  L 5-/5 fatiguing, R 5/5  Knee extension:  5/5 B  Ankle dorsiflexion:  5/5 B  EHL:  L 4 to 4+/5 fatiguing, R 5/5      Functional Outcome Score:  YUMIKO, =46% deficit    AQUATIC EX:     Swim freestyle x 1 lap ,  "elementary backstroke x 1/2 lap       *deferred  Water walking               -  March walk                  1 lap *deferred  Tandem walk               1 lap    *deferred  Stretch 1                      Hamstring with heel propped on step 20 sec x 2 ea *deferred  Stretch 2                      piriformis 20 sec x 2 ea  Stretch 3                      -  Stretch Other 1           -  Stretch Other 2           -  Vertical Traction          -  Abdominals                 White noodle pushdowns x 20  Clams                          -  Hip Abd/Add                -  Hip Flex/Ext                 -  March in Place            -  Mini Squat                   -  Toe/Heel Raises         -  Uni-Squat                    -  Uni-Clock                    x10 B  Step Ups w/ hip/knee  + UE reach                12x ea, intermittent rail support, bottom pool step  Bicycle                         suspended x 2 min - on own  Flutter/Scissor             -  Leg Press                    15x ea vs white noodle  Exercise 1                   UE alt rows x 15 ea with yellow hydrotones  Exercise 2                   Pot stirs x 15 ea direction w/ single yellow hydrotone  Exercise 3                   Shoulder abd/add x15 w/ blue foam dumbbells  Exercise 4                   UE \"L\" press downs x 10 ea way w/ blue foam dumbbells  Exercise 5                   KB push/pull x 20  Exercise 6                   STS 2x10 1st set rising up on tip toes, 2nd set rising up on toes + raising beach ball overhead - *deferred  Exercise 7                   1 legged STS x 5 ea *deferred  Exercise 8                   Tuck ups:  suspended with rail support 15x  Exercise 9                   Alternating Hz abduction x 10 ea with black aqualogix - *deferred  Exercise 10                 Open books x 10 ea arm with green aqualogix (anchoring opposite hand at rail) - *deferred  Exercise 11                 Plank (hands on pool bench) with alt hip ext x 10 ea  Exercise 12      "            Side Plank (1 hand on pool bench) x 30 sec ea *deferred  Exercise 13                 Mountain Climbers (hands on pool bench, neutral cervical spine) 2 x 30 sec     Assessment & Plan       Assessment  Assessment details: Branden Brady is a 73 y.o. M who has attended 5 aquatic therapy sessions and 2 land PT sessions since his evaluation on 11/2/2023 for treatment of chronic pain related to the lumbar / cervical spine.  He has comorbidities / personal factors including scoliosis, stenosis, DDD, remote hx of T9 compression fracture, arthritis, and bony / soft tissue adaptations to postural habits.  Today is his first aquatic session since 12/5/2023 as he was unable to get in pool following recent procedure.  He states he always feels good after therapy but benefit only lasts a couple of hours at most.  He denies any issue with recent land PT sessions or land based HEP to date.  He demonstrates improving postural awareness and states he feels like he is able to stand up straighter/taller.  He hasn't really noticed much change in overall pain or functional abilities / activity tolerance.  He has made slight improvement with ROM / strength but continues to demonstrate postural/core/LE strength deficits and impaired lumbar ROM.  He notes pain with transitional movements / prolonged WB activity, and episodes of neck/shoulder pain still affect his sleep at times.  His self reported Oswestry score was 46% today (which is worse than 38% at [facility]).  He has partially met 1 of 3 STGs with remaining STGs and all LTGs ongoing.  He may benefit from continuation of skilled therapy (aquatic / land) for further instruction / progression of appropriate exercise working toward transition to independent self care.    Prognosis details: STGs to be met in 6 weeks  1Doug Garza begins aquatic exercise to improve pain levels and functional movement.  Partially Met, short term benefit (~ 2 hours) following aquatic exercise but no real  change in overall pain levels or specific improvement in functional movement to date.    2. He is independent with an aquatic exercise program.  Ongoing/progressing  3. Greg begins core bracing and learning its application with movement and ADLs.  Ongoing/progressing      LTGs to be met in 12 weeks  1. Motor control of the R C7, L L5 and S1 myotomes are not fatiguing and equal to the opposite side with MMT.  Ongoing  2, Greg is sleeping without neck or shoulder pain.  Ongoing, continues to note episodes of neck/shoulder pain affecting sleep  3. He is independent with a HEP and education for self care.  Ongoing  4. YUMIKO deficit is reduced below 20%.  Ongoing, Oswestry score today = 23/50 or 46% deficit (was 38% at eval)                 Recommendations: Continue as planned  Timeframe: 1 month  Prognosis to achieve goals: good    PT Signature: Abbie Harrison PT  [place] License:  [id number]      Based upon review of the patient's progress and continued therapy plan, it is my medical opinion that Branden Brady should continue physical therapy treatment at [facility] PH[facility] THERAP[facility]  [address] [place] [address] [address] [place] [id number] [phone].    Signature: __________________________________  Panfilo Roland MD  NPI: [id number]                                          Timed:  Aquatic therapy  62577    45  mins

## 2024-01-25 ENCOUNTER — TREATMENT (OUTPATIENT)
Dept: PHYSICAL THERAPY | Facility: CLINIC | Age: 74
End: 2024-01-25
Payer: MEDICARE

## 2024-01-25 DIAGNOSIS — R29.898 LOSS OF MOVEMENT: ICD-10-CM

## 2024-01-25 DIAGNOSIS — G89.29 CHRONIC NECK PAIN: ICD-10-CM

## 2024-01-25 DIAGNOSIS — M54.41 CHRONIC BILATERAL LOW BACK PAIN WITH BILATERAL SCIATICA: Primary | ICD-10-CM

## 2024-01-25 DIAGNOSIS — G89.29 CHRONIC BILATERAL LOW BACK PAIN WITH BILATERAL SCIATICA: Primary | ICD-10-CM

## 2024-01-25 DIAGNOSIS — R29.3 POOR POSTURE: ICD-10-CM

## 2024-01-25 DIAGNOSIS — M54.42 CHRONIC BILATERAL LOW BACK PAIN WITH BILATERAL SCIATICA: Primary | ICD-10-CM

## 2024-01-25 DIAGNOSIS — R53.1 STRENGTH LOSS OF: ICD-10-CM

## 2024-01-25 DIAGNOSIS — M54.2 CHRONIC NECK PAIN: ICD-10-CM

## 2024-01-25 PROCEDURE — 97113 AQUATIC THERAPY/EXERCISES: CPT | Performed by: PHYSICAL THERAPIST

## 2024-01-25 NOTE — PROGRESS NOTES
Physical Therapy Daily Treatment Note    [facility]  [address] [place] [address]  [phone] (phone)  [phone] (fax)    Patient: Branden Brady   : 1950  Diagnosis/ICD-10 Code:  Chronic bilateral low back pain with bilateral sciatica [M54.42, M54.41, G89.29]  Referring practitioner: Panfilo Roland MD  Date of Initial Visit: Type: THERAPY  Noted: 2023  Today's Date: 2024  Patient seen for 10 sessions             Subjective Evaluation    History of Present Illness    Subjective comment: The aquatic therapy has taught me a different way of thinking about / doing exercise.  I'm learning to be more aware of my body, what muscles I'm using, and the way I move.  I still would like to get back to running again (even though spine MD does not recommend with his spine issues)       Objective     AQUATIC EX:     Swim freestyle x 1 lap , elementary backstroke x 1/2 lap       *deferred  Water walking               -  March walk                  1 lap holding beach ball OH  Tandem walk               1 lap  Stretch 1                      Hamstring with heel propped on step 20 sec x 2 ea *deferred  Stretch 2                      piriformis 20 sec x 2 ea  Stretch 3                      -  Stretch Other 1           -  Stretch Other 2           -  Vertical Traction          -  Abdominals                 White noodle pushdowns  rectus x 20, obliques x 10 ea  Clams                          -  Hip Abd/Add                -  Hip Flex/Ext                 -  March in Place            -  Mini Squat                   -  Toe/Heel Raises         -  Uni-Squat                    -  Uni-Clock                    x10 B  Step Ups w/ hip/knee  + UE reach                12x ea, intermittent rail support, bottom pool step  Bicycle                         suspended x 2 min - on own  Flutter/Scissor             -  Leg Press                    15x ea vs white  "noodle  Exercise 1                   UE alt rows x 15 ea with yellow hydrotones  Exercise 2                   Pot stirs x 15 ea direction w/ yellow hydrotones  Exercise 3                   Shoulder abd/add x 20 w/ blue foam dumbbells  Exercise 4                   UE \"L\" press downs x 10 ea way w/ blue foam dumbbells  Exercise 5                   KB push/pull x 20  Exercise 6                   STS w/ alt knee lift x 10 ea leg  Exercise 7                   1 legged STS x 5 ea *deferred  Exercise 8                   Tuck ups:  suspended with rail support 15x  Exercise 9                   Alternating Hz abduction x 10 ea with 1/2 open paddles  Exercise 10                 Open books x 10 ea arm with green aqualogix (anchoring opposite hand at rail) - *deferred  Exercise 11   wall pushups x 12  Exercise 12                 Plank (hands on pool bench) with alt hip ext x 10 ea  Exercise 13                 Side Plank (1 hand on pool bench) x 30 sec ea *deferred  Exercise 14                 Mountain Climbers (hands on pool bench, neutral cervical spine) 2 x 30 sec      Assessment & Plan       Assessment  Assessment details: Patient reports no issues after last time and feels like the aquatic therapy has made him more aware of his posture and taught him a completely different way to look at exercise.  Continued with most previous aquatic ex/activity for mobility, flexibility, and strength/stabilization.  Added oblique noodle pushdowns, wall pushups this visit.  He continues to exhibit decreased balance / stability w/ SL WB L>R L LE during dynamic balance work.  Emphasis on standing tall, actively engaging abdominals / gluts, and performing ex with good form / quality and control to reduce compensation / strain and optimize benefit.  Demonstration and cuing provided throughout session for optimal posture, core/glut activation, and correct form/technique with ex/activity.       Plan:  Continue skilled therapy working on postural " / core strength / stabilization with static and dynamic activity.  May consider golfers / dead lift motion w/ or w/o noodle pushdown, hip abd / ext, hip circles, and/or trial of water jogging in future.                     Timed:  Aquatic Therapy    40     mins 49774;    Abbie Harrison PT  Physical Therapist    [place] License: [id number]

## 2024-01-30 ENCOUNTER — TREATMENT (OUTPATIENT)
Dept: PHYSICAL THERAPY | Facility: CLINIC | Age: 74
End: 2024-01-30
Payer: MEDICARE

## 2024-01-30 DIAGNOSIS — G89.29 CHRONIC NECK PAIN: ICD-10-CM

## 2024-01-30 DIAGNOSIS — M54.41 CHRONIC BILATERAL LOW BACK PAIN WITH BILATERAL SCIATICA: Primary | ICD-10-CM

## 2024-01-30 DIAGNOSIS — M54.42 CHRONIC BILATERAL LOW BACK PAIN WITH BILATERAL SCIATICA: Primary | ICD-10-CM

## 2024-01-30 DIAGNOSIS — R53.1 STRENGTH LOSS OF: ICD-10-CM

## 2024-01-30 DIAGNOSIS — R29.3 POOR POSTURE: ICD-10-CM

## 2024-01-30 DIAGNOSIS — R29.898 LOSS OF MOVEMENT: ICD-10-CM

## 2024-01-30 DIAGNOSIS — G89.29 CHRONIC BILATERAL LOW BACK PAIN WITH BILATERAL SCIATICA: Primary | ICD-10-CM

## 2024-01-30 DIAGNOSIS — M54.2 CHRONIC NECK PAIN: ICD-10-CM

## 2024-01-30 PROCEDURE — 97110 THERAPEUTIC EXERCISES: CPT | Performed by: PHYSICAL THERAPIST

## 2024-01-30 PROCEDURE — 97140 MANUAL THERAPY 1/> REGIONS: CPT | Performed by: PHYSICAL THERAPIST

## 2024-01-30 NOTE — PROGRESS NOTES
Physical Therapy Daily Treatment Note    [facility] Therapy [facility]  [address] [place] [address]  [phone] (phone)  [phone] (fax)    Patient: Branden Brady   : 1950  Diagnosis/ICD-10 Code:  Chronic bilateral low back pain with bilateral sciatica [M54.42, M54.41, G89.29]  Referring practitioner: Panfilo Roland MD  Today's Date: 2024  Patient seen for 12 sessions    Visit Diagnoses:    ICD-10-CM ICD-9-CM   1. Chronic bilateral low back pain with bilateral sciatica  M54.42 724.2    M54.41 724.3    G89.29 338.29   2. Chronic neck pain  M54.2 723.1    G89.29 338.29   3. Poor posture  R29.3 781.92   4. Loss of movement  R29.898 344.9   5. Strength loss of  R53.1 780.79              Subjective: Greg states that he has very tight/sore hamstrings and his spine seems stiff in the kyphotic posture, first thing in the morning causing difficulty standing erect and walking initially.      Objective     Treatment    Therapeutic exercise  3 part KTC, x 5, hook lying  Heel slides with ankle pump at knee extension x 5, B  CV flexion, hook lying with pillow and neck roll, x 20  Neck rotation, with CV flexion, x 10, B  Standing row, 17.5 lbs., x 10  Standing pulldowns, 17.5 lbs., x 10    NMR: verbal cues for exercise technique were given    Manual therapy: In supine, legs on bolster, cervical spine decompression, sub-occipital release,lumbar spine decompression, passive KTC x 10, B, passive SLRs, x 10, B, sciatic nerve gliding x 10, B      Self care: I added lying neck rotation, with CV flexion, to his HEP    Assessment & Plan       Assessment  Assessment details: Greg learned new exercises to help warm up his body to movement when getting up in the mornings and one to further improve his neck AROM.      Plan  Plan details: Try a Saunder's unit on the neck neck visit to try mechanical traction.                Timed:    Manual Therapy:    15     mins  75424;  Therapeutic  Exercise:    30     mins  34897;     Neuromuscular Stephy:    5    mins  28733;    Therapeutic Activity:          mins  67366;     Gait Training:           mins  55966;     Ultrasound:          mins  66719;    Aquatic Therapy         mins 36876;  Self Care                            mins   91384        Untimed:  Electrical Stimulation:         mins  80350 ( );  Traction:         mins  00105;   Dry Needling  (1-2 muscles)                 mins 03385 (Self-pay)  Dry Needling (3-4 muscles)      20561 (Self-pay)  Dry Needling Trial         DRYNDLTRIAL  (No Charge)    Timed Treatment:   45   mins   Total Treatment:     45   mins    Gigi Wick PT  Physical Therapist    [place] License:[id number]

## 2024-02-01 ENCOUNTER — TREATMENT (OUTPATIENT)
Dept: PHYSICAL THERAPY | Facility: CLINIC | Age: 74
End: 2024-02-01
Payer: MEDICARE

## 2024-02-01 DIAGNOSIS — G89.29 CHRONIC BILATERAL LOW BACK PAIN WITH BILATERAL SCIATICA: Primary | ICD-10-CM

## 2024-02-01 DIAGNOSIS — M54.41 CHRONIC BILATERAL LOW BACK PAIN WITH BILATERAL SCIATICA: Primary | ICD-10-CM

## 2024-02-01 DIAGNOSIS — G89.29 CHRONIC NECK PAIN: ICD-10-CM

## 2024-02-01 DIAGNOSIS — R29.898 LOSS OF MOVEMENT: ICD-10-CM

## 2024-02-01 DIAGNOSIS — M54.42 CHRONIC BILATERAL LOW BACK PAIN WITH BILATERAL SCIATICA: Primary | ICD-10-CM

## 2024-02-01 DIAGNOSIS — M54.2 CHRONIC NECK PAIN: ICD-10-CM

## 2024-02-01 DIAGNOSIS — R53.1 STRENGTH LOSS OF: ICD-10-CM

## 2024-02-01 DIAGNOSIS — R29.3 POOR POSTURE: ICD-10-CM

## 2024-02-01 NOTE — PROGRESS NOTES
Physical Therapy Daily Treatment Note    [facility]  [address] [place] [address]  [phone] (phone)  [phone] (fax)    Patient: Branden Brady   : 1950  Diagnosis/ICD-10 Code:  Chronic bilateral low back pain with bilateral sciatica [M54.42, M54.41, G89.29]  Referring practitioner: Panfilo Roland MD  Today's Date: 2024  Patient seen for 13 sessions    Visit Diagnoses:    ICD-10-CM ICD-9-CM   1. Chronic bilateral low back pain with bilateral sciatica  M54.42 724.2    M54.41 724.3    G89.29 338.29   2. Chronic neck pain  M54.2 723.1    G89.29 338.29   3. Poor posture  R29.3 781.92   4. Loss of movement  R29.898 344.9   5. Strength loss of  R53.1 780.79              Subjective: Greg stated that he would like to try the Saunder's cervical spine home traction.    Objective     Therapeutic exercise  Standing shoulder extension, 17.5 lbs., 10 x 3  Standing row, 20 lbs., 10 x 3    Mechanical traction: in supine, legs on bolser, using the Saunder's home traction unit, cervical spine traction, x 15 minutes, 18 lbs,, intermittently adjusting to maintain the 18 lbs..,    Manual therapy: In supine, legs on bolster, lumbar spine decompress, via B leg pull, 60s hold x 3, followed by passive KTC and SLRs, x 10, each, B.  Sciatic nerve gliding x 10, B    Assessment & Plan       Assessment  Assessment details: Greg did well with the Saunder's traction on the cervical spine.  He is interested in getting some way to replicate the spinal decompression at home and the Saunder's unit gives him that option.  He required verbal cues for exercise technique.     Plan  Plan details: Monitor the effect of the Saunder's unit and progress exercises as indicated.                Timed:    Manual Therapy:    15     mins  95947;  Therapeutic Exercise:    10     mins  01365;     Neuromuscular Stephy:    5    mins  15172;    Therapeutic Activity:          mins  58226;     Gait  Training:           mins  26829;     Ultrasound:          mins  37998;    Aquatic Therapy         mins 96026;  Self Care                            mins   18109        Untimed:  Electrical Stimulation:         mins  86405 ( );  Traction:    15     mins  32998;   Dry Needling  (1-2 muscles)                 mins 20560 (Self-pay)  Dry Needling (3-4 muscles)      20561 (Self-pay)  Dry Needling Trial         DRYNDLTRIAL  (No Charge)    Timed Treatment:   30   mins   Total Treatment:     45   mins    Gigi Wick PT  Physical Therapist    [place] License:[id number]

## 2024-02-05 ENCOUNTER — TREATMENT (OUTPATIENT)
Dept: PHYSICAL THERAPY | Facility: CLINIC | Age: 74
End: 2024-02-05
Payer: MEDICARE

## 2024-02-05 DIAGNOSIS — G89.29 CHRONIC BILATERAL LOW BACK PAIN WITH BILATERAL SCIATICA: Primary | ICD-10-CM

## 2024-02-05 DIAGNOSIS — R29.3 POOR POSTURE: ICD-10-CM

## 2024-02-05 DIAGNOSIS — M54.2 CHRONIC NECK PAIN: ICD-10-CM

## 2024-02-05 DIAGNOSIS — R29.898 LOSS OF MOVEMENT: ICD-10-CM

## 2024-02-05 DIAGNOSIS — G89.29 CHRONIC NECK PAIN: ICD-10-CM

## 2024-02-05 DIAGNOSIS — M54.42 CHRONIC BILATERAL LOW BACK PAIN WITH BILATERAL SCIATICA: Primary | ICD-10-CM

## 2024-02-05 DIAGNOSIS — M54.41 CHRONIC BILATERAL LOW BACK PAIN WITH BILATERAL SCIATICA: Primary | ICD-10-CM

## 2024-02-05 DIAGNOSIS — R53.1 STRENGTH LOSS OF: ICD-10-CM

## 2024-02-05 PROCEDURE — 97110 THERAPEUTIC EXERCISES: CPT | Performed by: PHYSICAL THERAPIST

## 2024-02-05 PROCEDURE — 97112 NEUROMUSCULAR REEDUCATION: CPT | Performed by: PHYSICAL THERAPIST

## 2024-02-05 NOTE — PROGRESS NOTES
Physical Therapy Daily Treatment Note    [facility]  [address] [place] [address]  [phone] (phone)  [phone] (fax)    Patient: Branden Brady   : 1950  Diagnosis/ICD-10 Code:  Chronic bilateral low back pain with bilateral sciatica [M54.42, M54.41, G89.29]  Referring practitioner: Panfilo Roland MD  Today's Date: 2024  Patient seen for 14 sessions    Visit Diagnoses:    ICD-10-CM ICD-9-CM   1. Chronic bilateral low back pain with bilateral sciatica  M54.42 724.2    M54.41 724.3    G89.29 338.29   2. Chronic neck pain  M54.2 723.1    G89.29 338.29   3. Poor posture  R29.3 781.92   4. Loss of movement  R29.898 344.9   5. Strength loss of  R53.1 780.79              Subjective: Greg expressed questions about why the hook lying position for the neck exercise and correct bridge technique.  Greg complained of pain in the R lateral upper arm at night with sleeping.     Objective     Supraspinatus test:  L normal and R abnormal    Therapeutic exercise  Standing ER, red theraband, roll under arm moving, 10 x 2, B  CV flexion, hook lying, with pillow and neck roll, x 20  Neck rotation, with CV flexion, hook lying, with pillow and neck roll, x 10, B  3 part KTC, hook lying, x 5  Modified bridge, 5s x 10    NMR: verbal cues for exercise included assuming the hook lying position to place the lumbar spine in neutral. Locking the CV area in flexion for rotation, and with the modified bridge to lock the lumbar spine in neutral, then move the pelvis up, hold and lower all using the core to keep the spine locked in neutral.     Self care: I added ER with red theraband, standing with arm at side, and modified bridge to his HEP.    Assessment & Plan       Assessment  Assessment details: Pain with supraspinatus test on R indicated some involvement with that tissue.  The new ER exercise did not bother him, but he felt weaker on the R versus the L.  Greg required  verbal cues for exercise technique and tolerated treatment well.      Plan  Plan details: Continue per treatment plan.                Timed:    Manual Therapy:    15     mins  63554;  Therapeutic Exercise:    25     mins  31311;     Neuromuscular Stephy:    5    mins  94266;    Therapeutic Activity:          mins  39761;     Gait Training:           mins  18571;     Ultrasound:          mins  27841;    Aquatic Therapy         mins 71483;  Self Care                            mins   52894        Untimed:  Electrical Stimulation:         mins  41543 ( );  Traction:         mins  34368;   Dry Needling  (1-2 muscles)                 mins 20560 (Self-pay)  Dry Needling (3-4 muscles)      20561 (Self-pay)  Dry Needling Trial         DRYNDLTRIAL  (No Charge)    Timed Treatment:   45   mins   Total Treatment:     45   mins    Gigi Wick PT  Physical Therapist    [place] License:[id number]

## 2024-02-15 ENCOUNTER — TREATMENT (OUTPATIENT)
Dept: PHYSICAL THERAPY | Facility: CLINIC | Age: 74
End: 2024-02-15
Payer: MEDICARE

## 2024-02-15 DIAGNOSIS — G89.29 CHRONIC NECK PAIN: ICD-10-CM

## 2024-02-15 DIAGNOSIS — M54.2 CHRONIC NECK PAIN: ICD-10-CM

## 2024-02-15 DIAGNOSIS — R53.1 STRENGTH LOSS OF: ICD-10-CM

## 2024-02-15 DIAGNOSIS — M54.42 CHRONIC BILATERAL LOW BACK PAIN WITH BILATERAL SCIATICA: Primary | ICD-10-CM

## 2024-02-15 DIAGNOSIS — M54.41 CHRONIC BILATERAL LOW BACK PAIN WITH BILATERAL SCIATICA: Primary | ICD-10-CM

## 2024-02-15 DIAGNOSIS — G89.29 CHRONIC BILATERAL LOW BACK PAIN WITH BILATERAL SCIATICA: Primary | ICD-10-CM

## 2024-02-15 DIAGNOSIS — R29.898 LOSS OF MOVEMENT: ICD-10-CM

## 2024-02-15 DIAGNOSIS — R29.3 POOR POSTURE: ICD-10-CM

## 2024-02-28 ENCOUNTER — TELEPHONE (OUTPATIENT)
Dept: INTERNAL MEDICINE | Facility: CLINIC | Age: 74
End: 2024-02-28
Payer: MEDICARE

## 2024-02-29 ENCOUNTER — LAB (OUTPATIENT)
Facility: HOSPITAL | Age: 74
End: 2024-02-29
Payer: MEDICARE

## 2024-02-29 DIAGNOSIS — E03.8 SUBCLINICAL HYPOTHYROIDISM: ICD-10-CM

## 2024-02-29 DIAGNOSIS — E78.2 MIXED HYPERLIPIDEMIA: ICD-10-CM

## 2024-02-29 DIAGNOSIS — I25.10 CALCIFIC CORONARY ARTERIOSCLEROSIS: ICD-10-CM

## 2024-02-29 DIAGNOSIS — M79.10 MYALGIA: ICD-10-CM

## 2024-02-29 DIAGNOSIS — Z12.5 SCREENING FOR PROSTATE CANCER: ICD-10-CM

## 2024-02-29 DIAGNOSIS — R73.03 PREDIABETES: ICD-10-CM

## 2024-02-29 LAB
ALBUMIN SERPL-MCNC: 4.2 G/DL (ref 3.5–5.2)
ALBUMIN/GLOB SERPL: 1.6 G/DL
ALP SERPL-CCNC: 61 U/L (ref 39–117)
ALT SERPL W P-5'-P-CCNC: 26 U/L (ref 1–41)
ANION GAP SERPL CALCULATED.3IONS-SCNC: 9.5 MMOL/L (ref 5–15)
AST SERPL-CCNC: 34 U/L (ref 1–40)
BILIRUB SERPL-MCNC: 0.6 MG/DL (ref 0–1.2)
BUN SERPL-MCNC: 17 MG/DL (ref 8–23)
BUN/CREAT SERPL: 20.7 (ref 7–25)
CALCIUM SPEC-SCNC: 9.4 MG/DL (ref 8.6–10.5)
CHLORIDE SERPL-SCNC: 105 MMOL/L (ref 98–107)
CHOLEST SERPL-MCNC: 166 MG/DL (ref 0–200)
CO2 SERPL-SCNC: 25.5 MMOL/L (ref 22–29)
CREAT SERPL-MCNC: 0.82 MG/DL (ref 0.76–1.27)
DEPRECATED RDW RBC AUTO: 44.1 FL (ref 37–54)
EGFRCR SERPLBLD CKD-EPI 2021: 93.9 ML/MIN/1.73
ERYTHROCYTE [DISTWIDTH] IN BLOOD BY AUTOMATED COUNT: 13 % (ref 12.3–15.4)
GLOBULIN UR ELPH-MCNC: 2.7 GM/DL
GLUCOSE SERPL-MCNC: 112 MG/DL (ref 65–99)
HBA1C MFR BLD: 5.7 % (ref 4.8–5.6)
HCT VFR BLD AUTO: 47.3 % (ref 37.5–51)
HDLC SERPL-MCNC: 76 MG/DL (ref 40–60)
HGB BLD-MCNC: 16.2 G/DL (ref 13–17.7)
HOLD SPECIMEN: NORMAL
LDLC SERPL CALC-MCNC: 80 MG/DL (ref 0–100)
LDLC/HDLC SERPL: 1.06 {RATIO}
MCH RBC QN AUTO: 31.8 PG (ref 26.6–33)
MCHC RBC AUTO-ENTMCNC: 34.2 G/DL (ref 31.5–35.7)
MCV RBC AUTO: 92.9 FL (ref 79–97)
PLATELET # BLD AUTO: 233 10*3/MM3 (ref 140–450)
PMV BLD AUTO: 10.3 FL (ref 6–12)
POTASSIUM SERPL-SCNC: 4.8 MMOL/L (ref 3.5–5.2)
PROT SERPL-MCNC: 6.9 G/DL (ref 6–8.5)
PSA SERPL-MCNC: 2.85 NG/ML (ref 0–4)
RBC # BLD AUTO: 5.09 10*6/MM3 (ref 4.14–5.8)
SODIUM SERPL-SCNC: 140 MMOL/L (ref 136–145)
T4 FREE SERPL-MCNC: 1.05 NG/DL (ref 0.93–1.7)
TRIGL SERPL-MCNC: 47 MG/DL (ref 0–150)
TSH SERPL DL<=0.05 MIU/L-ACNC: 6.82 UIU/ML (ref 0.27–4.2)
VLDLC SERPL-MCNC: 10 MG/DL (ref 5–40)
WBC NRBC COR # BLD AUTO: 9.11 10*3/MM3 (ref 3.4–10.8)

## 2024-02-29 PROCEDURE — 84439 ASSAY OF FREE THYROXINE: CPT

## 2024-02-29 PROCEDURE — G0103 PSA SCREENING: HCPCS

## 2024-02-29 PROCEDURE — 82550 ASSAY OF CK (CPK): CPT

## 2024-02-29 PROCEDURE — 84443 ASSAY THYROID STIM HORMONE: CPT

## 2024-02-29 PROCEDURE — 80053 COMPREHEN METABOLIC PANEL: CPT

## 2024-02-29 PROCEDURE — 83036 HEMOGLOBIN GLYCOSYLATED A1C: CPT

## 2024-02-29 PROCEDURE — 80061 LIPID PANEL: CPT

## 2024-02-29 PROCEDURE — 36415 COLL VENOUS BLD VENIPUNCTURE: CPT

## 2024-02-29 PROCEDURE — 85027 COMPLETE CBC AUTOMATED: CPT

## 2024-03-01 ENCOUNTER — OFFICE VISIT (OUTPATIENT)
Dept: INTERNAL MEDICINE | Facility: CLINIC | Age: 74
End: 2024-03-01
Payer: MEDICARE

## 2024-03-01 VITALS
SYSTOLIC BLOOD PRESSURE: 136 MMHG | DIASTOLIC BLOOD PRESSURE: 82 MMHG | HEIGHT: 72 IN | BODY MASS INDEX: 26.41 KG/M2 | WEIGHT: 195 LBS

## 2024-03-01 DIAGNOSIS — R74.8 ELEVATED CK: ICD-10-CM

## 2024-03-01 DIAGNOSIS — Z00.00 MEDICARE ANNUAL WELLNESS VISIT, SUBSEQUENT: Primary | ICD-10-CM

## 2024-03-01 DIAGNOSIS — R73.03 PREDIABETES: ICD-10-CM

## 2024-03-01 DIAGNOSIS — E78.2 MIXED HYPERLIPIDEMIA: ICD-10-CM

## 2024-03-01 DIAGNOSIS — M79.10 MYALGIA: ICD-10-CM

## 2024-03-01 DIAGNOSIS — E03.9 ACQUIRED HYPOTHYROIDISM: Chronic | ICD-10-CM

## 2024-03-01 DIAGNOSIS — I25.10 CALCIFIC CORONARY ARTERIOSCLEROSIS: ICD-10-CM

## 2024-03-01 DIAGNOSIS — R74.8 ELEVATED CK: Primary | ICD-10-CM

## 2024-03-01 LAB — CK SERPL-CCNC: 583 U/L (ref 20–200)

## 2024-03-01 RX ORDER — ROSUVASTATIN CALCIUM 20 MG/1
20 TABLET, COATED ORAL NIGHTLY
Qty: 90 TABLET | Refills: 4 | Status: SHIPPED | OUTPATIENT
Start: 2024-03-01

## 2024-03-01 NOTE — PROGRESS NOTES
The ABCs of the Annual Wellness Visit  Subsequent Medicare Wellness Visit    Subjective    Branden Brady is a 73 y.o. male who presents for a Subsequent Medicare Wellness Visit.    The following portions of the patient's history were reviewed and   updated as appropriate: allergies, current medications, past family history, past medical history, past social history, past surgical history, and problem list.    Compared to one year ago, the patient feels his physical   health is the same.    Compared to one year ago, the patient feels his mental   health is the same.    Recent Hospitalizations:  He was not admitted to the hospital during the last year.       Current Medical Providers:  Patient Care Team:  Kel Mcnally MD as PCP - General (Family Medicine)  Dariana Dalton MD (Inactive) as PCP - Family Medicine    Outpatient Medications Prior to Visit   Medication Sig Dispense Refill    Multiple Vitamins-Minerals (MULTIVITAMIN ADULT PO) Take  by mouth Daily.      tretinoin (RETIN-A) 0.05 % cream Every Night.  5    rosuvastatin (CRESTOR) 40 MG tablet Take 1 tablet by mouth Every Night. 90 tablet 4    cephalexin (KEFLEX) 250 MG capsule Take 1 capsule by mouth 4 (Four) Times a Day.      valACYclovir (Valtrex) 1000 MG tablet Take 1 tablet by mouth 2 (Two) Times a Day. 20 tablet 0     No facility-administered medications prior to visit.       No opioid medication identified on active medication list. I have reviewed chart for other potential  high risk medication/s and harmful drug interactions in the elderly.        Aspirin is not on active medication list.  Aspirin use is not indicated based on review of current medical condition/s. Risk of harm outweighs potential benefits.  .    Patient Active Problem List   Diagnosis    Mixed hyperlipidemia    Chronic bilateral low back pain with bilateral sciatica    DDD (degenerative disc disease), lumbar    Colon cancer screening    Prediabetes    Olecranon bursitis of left  "elbow    Spinal stenosis of lumbar region with neurogenic claudication    Abdominal aortic atherosclerosis    Aneurysm of ascending aorta    Calcific coronary arteriosclerosis    Elevated blood pressure reading without diagnosis of hypertension    Squamous cell carcinoma of skin of right lower extremity    Hiatal hernia    Acquired hypothyroidism     Advance Care Planning   Advance Care Planning     Advance Directive is not on file.  ACP discussion was held with the patient during this visit. Patient has an advance directive (not in EMR), copy requested.     Objective    Vitals:    03/01/24 1001   BP: 136/82   BP Location: Left arm   Patient Position: Sitting   Cuff Size: Small Adult   Weight: 88.5 kg (195 lb)   Height: 182.9 cm (72\")     Estimated body mass index is 26.45 kg/m² as calculated from the following:    Height as of this encounter: 182.9 cm (72\").    Weight as of this encounter: 88.5 kg (195 lb).           Does the patient have evidence of cognitive impairment? No    Lab Results   Component Value Date    TRIG 47 02/29/2024    HDL 76 (H) 02/29/2024    LDL 80 02/29/2024    VLDL 10 02/29/2024    HGBA1C 5.70 (H) 02/29/2024        HEALTH RISK ASSESSMENT    Smoking Status:  Social History     Tobacco Use   Smoking Status Never   Smokeless Tobacco Never     Alcohol Consumption:  Social History     Substance and Sexual Activity   Alcohol Use Not Currently    Comment: Socially      Fall Risk Screen:    STEADI Fall Risk Assessment was completed, and patient is at LOW risk for falls.Assessment completed on:3/1/2024    Depression Screening:      3/1/2024    10:02 AM   PHQ-2/PHQ-9 Depression Screening   Little Interest or Pleasure in Doing Things 0-->not at all   Feeling Down, Depressed or Hopeless 0-->not at all   PHQ-9: Brief Depression Severity Measure Score 0       Health Habits and Functional and Cognitive Screening:      3/1/2024    10:01 AM   Functional & Cognitive Status   Do you have difficulty preparing " food and eating? No   Do you have difficulty bathing yourself, getting dressed or grooming yourself? No   Do you have difficulty using the toilet? No   Do you have difficulty moving around from place to place? No   Do you have trouble with steps or getting out of a bed or a chair? No   Current Diet Well Balanced Diet   Dental Exam Up to date   Eye Exam Up to date   Exercise (times per week) 7 times per week   Current Exercises Include Walking   Do you need help using the phone?  No   Are you deaf or do you have serious difficulty hearing?  No   Do you need help to go to places out of walking distance? No   Do you need help shopping? No   Do you need help preparing meals?  No   Do you need help with housework?  No   Do you need help with laundry? No   Do you need help taking your medications? No   Do you need help managing money? No   Do you ever drive or ride in a car without wearing a seat belt? No   Have you felt unusual stress, anger or loneliness in the last month? No   Who do you live with? Spouse   If you need help, do you have trouble finding someone available to you? No   Have you been bothered in the last four weeks by sexual problems? No   Do you have difficulty concentrating, remembering or making decisions? No       Age-appropriate Screening Schedule:  Refer to the list below for future screening recommendations based on patient's age, sex and/or medical conditions. Orders for these recommended tests are listed in the plan section. The patient has been provided with a written plan.    Health Maintenance   Topic Date Due    RSV Vaccine - Adults (1 - 1-dose 60+ series) Never done    ZOSTER VACCINE (2 of 3) 02/29/2016    COVID-19 Vaccine (3 - 2023-24 season) 09/01/2023    BMI FOLLOWUP  06/29/2024    LIPID PANEL  02/28/2025    ANNUAL WELLNESS VISIT  03/01/2025    COLORECTAL CANCER SCREENING  09/07/2027    TDAP/TD VACCINES (3 - Td or Tdap) 07/12/2031    HEPATITIS C SCREENING  Completed    INFLUENZA VACCINE   "Completed    Pneumococcal Vaccine 65+  Completed    DXA SCAN  Discontinued                  CMS Preventative Services Quick Reference  Risk Factors Identified During Encounter  Immunizations Discussed/Encouraged: Influenza, Prevnar 20 (Pneumococcal 20-valent conjugate), and COVID19  The above risks/problems have been discussed with the patient.  Pertinent information has been shared with the patient in the After Visit Summary.  An After Visit Summary and PPPS were made available to the patient.    Follow Up:   Next Medicare Wellness visit to be scheduled in 1 year.       Additional E&M Note during same encounter follows:  Patient has multiple medical problems which are significant and separately identifiable that require additional work above and beyond the Medicare Wellness Visit.      Chief Complaint  Medicare Wellness-subsequent    Subjective        HPI  Branden Brady is also being seen today for hyperlipidemia, prediabetes and hypothyroidism.   Taking medications as prescribed below.  Labs completed 2/29/2024 and we went over the results today.  He is having myalgias which are bothersome, especially at the 40 mg of rosuvastatin dose.  His labs are showing that he has a slightly more elevated TSH than the time before with a technically normal free T4.      Current Outpatient Medications:     Multiple Vitamins-Minerals (MULTIVITAMIN ADULT PO), Take  by mouth Daily., Disp: , Rfl:     rosuvastatin (CRESTOR) 20 MG tablet, Take 1 tablet by mouth Every Night., Disp: 90 tablet, Rfl: 4    tretinoin (RETIN-A) 0.05 % cream, Every Night., Disp: , Rfl: 5             Objective   Vital Signs:  /82 (BP Location: Left arm, Patient Position: Sitting, Cuff Size: Small Adult)   Ht 182.9 cm (72\")   Wt 88.5 kg (195 lb)   BMI 26.45 kg/m²     Physical Exam  Vitals and nursing note reviewed.   Constitutional:       General: He is not in acute distress.     Appearance: Normal appearance.   Cardiovascular:      Rate and " Rhythm: Normal rate and regular rhythm.      Heart sounds: Normal heart sounds. No murmur heard.  Pulmonary:      Effort: Pulmonary effort is normal.      Breath sounds: Normal breath sounds.   Neurological:      Mental Status: He is alert.          The following data was reviewed by: Kel Mcnally MD on 03/01/2024:  Common labs          9/12/2023    10:06 12/14/2023    11:51 2/29/2024    09:39   Common Labs   Glucose 101  99  112    BUN 17  22  17    Creatinine 0.86  0.85  0.82    Sodium 141  141  140    Potassium 5.1  4.8  4.8    Chloride 105  104  105    Calcium 9.8  9.5  9.4    Total Protein 6.6  6.5     Albumin 4.4  4.3  4.2    Total Bilirubin 0.8  0.5  0.6    Alkaline Phosphatase 62  67  61    AST (SGOT) 48  37  34    ALT (SGPT) 39  30  26    WBC 7.70  7.76  9.11    Hemoglobin 15.8  15.0  16.2    Hematocrit 45.7  43.9  47.3    Platelets 214  225  233    Total Cholesterol   166    Total Cholesterol 143      Triglycerides 53   47    HDL Cholesterol 64   76    LDL Cholesterol  68   80    Hemoglobin A1C 5.70   5.70    PSA   2.850      TSH          9/12/2023    10:06 2/29/2024    09:39   TSH   TSH 4.860  6.820    Free T4 1.05 - 2/29/2024                 Assessment and Plan   Diagnoses and all orders for this visit:    1. Medicare annual wellness visit, subsequent (Primary)    2. Mixed hyperlipidemia  -     rosuvastatin (CRESTOR) 20 MG tablet; Take 1 tablet by mouth Every Night.  Dispense: 90 tablet; Refill: 4  -     Lipid Panel With LDL / HDL Ratio; Future  -     Comprehensive Metabolic Panel; Future    3. Prediabetes  -     Comprehensive Metabolic Panel; Future    4. Acquired hypothyroidism  -     TSH Rfx On Abnormal To Free T4; Future    5. Myalgia  -     CK; Future    6. Calcific coronary arteriosclerosis  -     rosuvastatin (CRESTOR) 20 MG tablet; Take 1 tablet by mouth Every Night.  Dispense: 90 tablet; Refill: 4    7. Elevated CK        Hyperlipidemia and prediabetes stable.  Given his myalgias, we can try  a middle dose of rosuvastatin so I will decrease it to 20 mg and recheck lipid panel in 2 months.  He is a little hesitant to start thyroid supplementation at this point.  He is not having any clinical symptoms of hypothyroidism so I think it is fine to recheck the thyroid in 2 months as well.  I will hold off on supplementation right now.  Labs reviewed and ordered as above.    I also checked his muscle enzyme and it came back at 583.  I will recheck his CK after decreasing his dose to see if that has gotten better.       Follow Up   Return in about 6 months (around 9/1/2024) for Next scheduled follow up.  Patient was given instructions and counseling regarding his condition or for health maintenance advice. Please see specific information pulled into the AVS if appropriate.

## 2024-03-01 NOTE — PATIENT INSTRUCTIONS
"MyChart Tips:    [employer] can be a useful part of our patient care program and is a way for us to communicate lab results and for you to request refills.  Here is some information regarding the best way to use [employer] for communication.       Examples of medical issues that are APPROPRIATE for [employer]:  -Follow-up on problems we have already addressed in a visit such as home testing results, blood pressure readings, glucose readings  -Questions that can be answered with a simple \"yes\" or \"no\"  -Please keep communication concise and to the point.  All other types of communication should be held for an office visit.    Communication that is NOT APPROPRIATE for [employer]:  -New problems, serious problems or urgent problems.  Urgent matters should be addressed by phone for advice, in the office, urgent care or the ER.  -Requesting Paxlovid or Antibiotics: These types of problems require an evaluation unless your provider approved this previously.    -[employer] messages are not email.  Staff will check messages each weekday.  We strive for a 48-hour turnaround on messaging.    -[employer] is not for private issues.  Messages are received first by our office staff.    Please be mindful that sometimes it may take longer to receive a response on [employer].  Many times of the year we have high volumes of messages coming into physician inboxes.      Please also be mindful that [employer] messages become part of your permanent medical record.    We appreciate your respect for the limitations and boundaries of [employer].      Lab Results:  Please allow up to 7 business days for lab results to be sent through [employer] to you before contacting your provider.  Sometimes we are waiting for results to get back from the lab and also your provider needs time to analyze them thoroughly before making recommendations.  Also, providers may be out of the office on vacation.      While the internet has great resources, it is not a substitute for " interpreting lab results.

## 2024-03-05 ENCOUNTER — TELEPHONE (OUTPATIENT)
Dept: PHYSICAL THERAPY | Facility: OTHER | Age: 74
End: 2024-03-05
Payer: MEDICARE

## 2024-03-05 NOTE — TELEPHONE ENCOUNTER
Caller: Branden Brady    Relationship: Self    Best call back number: [phone]    What is the best time to reach you: ANY    Who are you requesting to speak with (clinical staff, provider,  specific staff member): ANY    What was the call regarding: PATIENT WANTED TO CANCEL HIS APPT THIS FRIDAY 3/8 AND RESCHEDULE IT. I ONLY SEE AVAILABILITY ON 4/14. PLEASE CALL PATIENT BACK TO RESCHEDULE HIS APPOINTMENT. I CALLED TWICE WITH NO ANSWER

## 2024-03-06 ENCOUNTER — TELEPHONE (OUTPATIENT)
Dept: NEUROSURGERY | Facility: CLINIC | Age: 74
End: 2024-03-06
Payer: MEDICARE

## 2024-03-06 NOTE — TELEPHONE ENCOUNTER
Patient is asking for a callback before talking to Dr. LYONS about his other questions as he has additional questions regarding symptoms and bloodwork, thanks!

## 2024-03-06 NOTE — TELEPHONE ENCOUNTER
PATIENT CALLED IN AND STATES HIS SYMPTOMS HAVE WORSEN; STATES HE IS HAVING MORE LEG CRAMPS.  PATIENT DOES NOT HAVE ANY RECENT IMAGING - WOULD LIKE TO BE SEEN BY DR. LYONS IF POSSIBLE     PLEASE CALL PATIENT WITH ANY ADVICE     LAST TIME SEEN 11/23/22    THANK YOU!

## 2024-03-12 DIAGNOSIS — M48.062 SPINAL STENOSIS OF LUMBAR REGION WITH NEUROGENIC CLAUDICATION: ICD-10-CM

## 2024-03-12 DIAGNOSIS — M54.42 CHRONIC BILATERAL LOW BACK PAIN WITH BILATERAL SCIATICA: Primary | ICD-10-CM

## 2024-03-12 DIAGNOSIS — M54.42 CHRONIC BILATERAL LOW BACK PAIN WITH BILATERAL SCIATICA: ICD-10-CM

## 2024-03-12 DIAGNOSIS — M54.41 CHRONIC BILATERAL LOW BACK PAIN WITH BILATERAL SCIATICA: ICD-10-CM

## 2024-03-12 DIAGNOSIS — M54.41 CHRONIC BILATERAL LOW BACK PAIN WITH BILATERAL SCIATICA: Primary | ICD-10-CM

## 2024-03-12 DIAGNOSIS — G89.29 CHRONIC BILATERAL LOW BACK PAIN WITH BILATERAL SCIATICA: Primary | ICD-10-CM

## 2024-03-12 DIAGNOSIS — G89.29 CHRONIC BILATERAL LOW BACK PAIN WITH BILATERAL SCIATICA: ICD-10-CM

## 2024-03-12 DIAGNOSIS — M51.36 DDD (DEGENERATIVE DISC DISEASE), LUMBAR: Primary | ICD-10-CM

## 2024-03-12 NOTE — TELEPHONE ENCOUNTER
HUB CAN READ    LVM for patient to let him know that Dr. Roland want a new MRI and xray's before next appointment Mri scheduling should be calling within the next week to schedule. Xray's are a walk in bases.

## 2024-03-13 NOTE — TELEPHONE ENCOUNTER
PATIENT CALLED BACK, AND WASREAD THE MESSAGE AS DIRECTED, BUT SAYS HE WANTS TO TALK TO EMMANUELLE MORE ABOUT MRI AND SCHEDULING.    TRANSFERRED TO C/S FOR MRI APPT

## 2024-03-15 ENCOUNTER — TREATMENT (OUTPATIENT)
Dept: PHYSICAL THERAPY | Facility: CLINIC | Age: 74
End: 2024-03-15
Payer: MEDICARE

## 2024-03-15 DIAGNOSIS — G89.29 CHRONIC BILATERAL LOW BACK PAIN WITH BILATERAL SCIATICA: Primary | ICD-10-CM

## 2024-03-15 DIAGNOSIS — M54.42 CHRONIC BILATERAL LOW BACK PAIN WITH BILATERAL SCIATICA: Primary | ICD-10-CM

## 2024-03-15 DIAGNOSIS — M54.41 CHRONIC BILATERAL LOW BACK PAIN WITH BILATERAL SCIATICA: Primary | ICD-10-CM

## 2024-03-15 DIAGNOSIS — R29.3 POOR POSTURE: ICD-10-CM

## 2024-03-15 DIAGNOSIS — M54.2 CHRONIC NECK PAIN: ICD-10-CM

## 2024-03-15 DIAGNOSIS — R53.1 STRENGTH LOSS OF: ICD-10-CM

## 2024-03-15 DIAGNOSIS — G89.29 CHRONIC NECK PAIN: ICD-10-CM

## 2024-03-15 DIAGNOSIS — R29.898 LOSS OF MOVEMENT: ICD-10-CM

## 2024-03-15 PROCEDURE — 97110 THERAPEUTIC EXERCISES: CPT | Performed by: PHYSICAL THERAPIST

## 2024-03-15 PROCEDURE — 97112 NEUROMUSCULAR REEDUCATION: CPT | Performed by: PHYSICAL THERAPIST

## 2024-03-15 NOTE — PROGRESS NOTES
HPI: 48 female with  pmh of HTN, Anxiety, palpitations, Depression, GERD, Fibroids, anemia, chronic back pain s/p lumbar laminectomy (4/2022) admitted for worsening back pain, now s/p L5-S1 revision laminectomy w/psf on 5/12, w/ some post-op fevers.    Subjective: Called overnight for abnormal UA. Pt was found to have equivocal UA, w/no nitrites but moderate LE, blood, bacteria. She endorses some UTI sxs, noting that she does have some increased urination and hematuria in the past two days a/w some abdominal pain, although she does note hx of right sided uterine fibroids confirmed on CT ab/pelvis that was performed earlier today.    Vital Signs Last 24 Hrs  T(C): 37.5 (14 May 2022 21:35), Max: 39.1 (14 May 2022 09:56)  T(F): 99.5 (14 May 2022 21:35), Max: 102.3 (14 May 2022 09:56)  HR: 114 (14 May 2022 21:35) (97 - 136)  BP: 99/64 (14 May 2022 21:35) (90/55 - 118/85)  BP(mean): 66 (14 May 2022 03:31) (66 - 66)  RR: 18 (14 May 2022 21:35) (16 - 18)  SpO2: 99% (14 May 2022 21:35) (93% - 100%)    Physical Exam  General: NAD, lying in bed  Head:  Atraumatic, Normocephalic  Eyes: EOMI, conjunctiva clear, no discharge  ENT: Moist mucous membranes; normal, atraumatic nares  Neck: Supple without adenopathy. No JVD  Respiratory: Normal WOB, no use of accessory muscles of respiration, no signs of respiratory distress.  Cardiovascular: S1 and S2 appreciated, no palpitations  Abdominal: Soft, non-distended mild TTP in the RLQ, some suprapubic discomfort on palpation as well      PLAN  -Pt has equivocal UA/WBC WNL but does have some frequency/hematuria a/w suprapubic discomfort on abdominal exam, morales-in place.  -Will treat for UTI for now: TMP-/800 mg BID for 5 days  -F/u urine cxs Physical Therapy Daily Treatment/Reassessment Note    [facility]  [address] [place] [address]  [phone] (phone)  [phone] (fax)    Patient: Branden Brady   : 1950  Diagnosis/ICD-10 Code:  Chronic bilateral low back pain with bilateral sciatica [M54.42, M54.41, G89.29]  Referring practitioner: Panfilo Roland MD  Today's Date: 3/15/2024  Patient seen for 16 sessions    Visit Diagnoses:    ICD-10-CM ICD-9-CM   1. Chronic bilateral low back pain with bilateral sciatica  M54.42 724.2    M54.41 724.3    G89.29 338.29   2. Chronic neck pain  M54.2 723.1    G89.29 338.29   3. Poor posture  R29.3 781.92   4. Loss of movement  R29.898 344.9   5. Strength loss of  R53.1 780.79              Subjective:  Greg stated that he has issues with being very stiff/sore after a night's sleep or extended sitting, 1/2 hour or more.  He     Objective     Observation: Greg continues to have kyphotic posture in standing.      Treatment    Therapeutic exercise  Core bracing, hook lying, 30s x 5  Modified bridge, 10s x 10  3 part KTC x 10  Sciatic nerve gliding, 10 x 2, B  LTR, x 10, B    NMR: verbal cues for exercise were given.  For neutral positioning of the lumbar spine, breathing and falguni the core for the bracing, using the core during the bridge and general technique with the KTC, nerve gliding and LTR.     Self care I added the core bracing and LTR to his HEP. I also recommended to Greg to observe how he feels in the mornings and try to see if there is a relationship between his activities of the previous day and its' effect on the back pain or symptoms.    Assessment & Plan       Assessment  Impairments: abnormal gait, abnormal or restricted ROM, activity intolerance, impaired physical strength, pain with function and weight-bearing intolerance   Functional limitations: carrying objects, lifting, walking, pulling, pushing, uncomfortable because of pain, sitting  and standing   Assessment details: Branden Brady has been seen for 18 physical therapy sessions for chronic B LBP with B sciatica.  Treatment has included therapeutic exercise, manual therapy, neuro-muscular retraining , aquatic therapy, and patient education with home exercise program . Progress to physical therapy goals is fair to good.  He will benefit from continued skilled physical therapy to address remaining impairments and functional limitations.      Goals  Plan Goals: STGs to be met in 6 weeks  1. Greg begins aquatic exercise to improve pain levels and functional movement.  Partially Met, short term benefit (~ 2 hours) following aquatic exercise but no real change in overall pain levels or specific improvement in functional movement to date.    2. He is independent with an aquatic exercise program.  (Met)  3. Greg begins core bracing and learning its application with movement and ADLs.  Ongoing/progressing (met)     LTGs to be met in 12 weeks  1. Motor control of the R C7, L L5 and S1 myotomes are not fatiguing and equal to the opposite side with MMT.  Ongoing  2, Greg is sleeping without neck or shoulder pain.  Ongoing, 3. He is independent with a HEP and education for self care.  Ongoing  4. YUMIKO deficit is reduced below 20%.  Ongoing, Oswestry score today = 23/50 or 46% deficit (was 38% at eval)             Plan  Planned modality interventions: ultrasound and TENS  Other planned modality interventions: aquatic therapy  Planned therapy interventions: manual therapy, postural training, neuromuscular re-education, soft tissue mobilization, strengthening, stretching, therapeutic activities, transfer training, home exercise program, functional ROM exercises, flexibility, body mechanics training, balance/weight-bearing training and ADL retraining  Frequency: 1x month  Duration in weeks: 4  Treatment plan discussed with: patient  Plan details: Continue in one month after his visit with Dr. Roland                Timed:    Manual Therapy:         mins  25335;  Therapeutic Exercise:    37     mins  38028;     Neuromuscular Stephy:    8    mins  71607;    Therapeutic Activity:          mins  18100;     Gait Training:           mins  27956;     Ultrasound:          mins  98632;    Aquatic Therapy         mins 67534;  Self Care                            mins   93477        Untimed:  Electrical Stimulation:         mins  59865 ( );  Traction:         mins  85031;   Dry Needling  (1-2 muscles)                 mins 20560 (Self-pay)  Dry Needling (3-4 muscles)      20561 (Self-pay)  Dry Needling Trial         DRYNDLTRIAL  (No Charge)    Timed Treatment:  45    mins   Total Treatment:     45   mins    Gigi Wick PT  Physical Therapist    [place] License:[id number]

## 2024-03-26 ENCOUNTER — HOSPITAL ENCOUNTER (OUTPATIENT)
Dept: MRI IMAGING | Facility: HOSPITAL | Age: 74
Discharge: HOME | End: 2024-03-26
Payer: MEDICARE

## 2024-03-26 ENCOUNTER — HOSPITAL ENCOUNTER (OUTPATIENT)
Dept: GENERAL RADIOLOGY | Facility: HOSPITAL | Age: 74
Discharge: HOME | End: 2024-03-26
Payer: MEDICARE

## 2024-03-26 DIAGNOSIS — M54.42 CHRONIC BILATERAL LOW BACK PAIN WITH BILATERAL SCIATICA: ICD-10-CM

## 2024-03-26 DIAGNOSIS — M54.41 CHRONIC BILATERAL LOW BACK PAIN WITH BILATERAL SCIATICA: ICD-10-CM

## 2024-03-26 DIAGNOSIS — G89.29 CHRONIC BILATERAL LOW BACK PAIN WITH BILATERAL SCIATICA: ICD-10-CM

## 2024-03-26 DIAGNOSIS — M51.36 DDD (DEGENERATIVE DISC DISEASE), LUMBAR: ICD-10-CM

## 2024-03-26 DIAGNOSIS — M48.062 SPINAL STENOSIS OF LUMBAR REGION WITH NEUROGENIC CLAUDICATION: ICD-10-CM

## 2024-03-26 PROCEDURE — 72110 X-RAY EXAM L-2 SPINE 4/>VWS: CPT

## 2024-03-26 PROCEDURE — 72148 MRI LUMBAR SPINE W/O DYE: CPT

## 2024-03-28 NOTE — PROGRESS NOTES
Subjective   Patient ID: Branden Brady is a 73 y.o. male is here today for follow-up on xray's and MRI done on 03/26/24    I have not seen this gentleman about 18 months.  He said he been having a little bit more pain in the buttocks and the legs when he got up in the morning but as he goes to the day he is able to function by about 2 hours after getting up.  He still swims regularly and still walks 2 or 3 miles per day.  Surprisingly he has some left calf weakness which he had not noticed before.  It does not seem to affect his walking.  He gave up running because of his back.  His back actually does not hurt him too much but it is more neurogenic claudication as a result of the progression of his spinal stenosis which I discussed with him.  He still feels quite functional.  There is no obvious reason why he would have a peripheral neuropathy.  And he may have had this weakness for a while with just that he did not notice it.  I think an EMG/NCS could be helpful to sort out the potential for other sources other than his spine.  He still feels the pain is manageable but it has changed in the sense that he is more aware of it.  I do not even think we necessarily need to get epidural blocks since he still quite functional but that might be the next step at some point.  I did remind him that if we had to do surgery it would be a fairly extensive surgery that would probably involve a fusion at L3-L4 and so if he is functional there still is not any reason to go in that direction.    History of Present Illness    The following portions of the patient's history were reviewed and updated as appropriate: allergies, current medications, past family history, past medical history, past social history, past surgical history, and problem list.    Review of Systems   Constitutional:  Negative for fever.   Musculoskeletal:  Positive for back pain. Negative for gait problem.   Neurological:  Negative for weakness and numbness.  "  All other systems reviewed and are negative.          Objective     Vitals:    04/01/24 1132   BP: 124/76   BP Location: Left arm   Patient Position: Sitting   Cuff Size: Adult   Resp: 20   Weight: 88.5 kg (195 lb)   Height: 182.9 cm (72.01\")   PainSc:   4   PainLoc: Back     Body mass index is 26.44 kg/m².    Tobacco Use: Low Risk  (4/1/2024)    Patient History     Smoking Tobacco Use: Never     Smokeless Tobacco Use: Never     Passive Exposure: Not on file          Physical Exam  Constitutional:       Appearance: He is well-developed.   HENT:      Head: Normocephalic and atraumatic.   Eyes:      Extraocular Movements: EOM normal.      Conjunctiva/sclera: Conjunctivae normal.      Pupils: Pupils are equal, round, and reactive to light.   Neck:      Vascular: No carotid bruit.   Neurological:      Mental Status: He is oriented to person, place, and time.      Coordination: Finger-Nose-Finger Test and Heel to Shin Test normal.      Gait: Gait is intact.      Deep Tendon Reflexes:      Reflex Scores:       Tricep reflexes are 2+ on the right side and 2+ on the left side.       Bicep reflexes are 2+ on the right side and 2+ on the left side.       Brachioradialis reflexes are 2+ on the right side and 2+ on the left side.       Patellar reflexes are 2+ on the right side and 2+ on the left side.       Achilles reflexes are 2+ on the right side and 2+ on the left side.  Psychiatric:         Speech: Speech normal.       Neurologic Exam     Mental Status   Oriented to person, place, and time.   Registration of memory: Good recent and remote memory.   Attention: normal. Concentration: normal.   Speech: speech is normal   Level of consciousness: alert  Knowledge: consistent with education.     Cranial Nerves     CN II   Visual fields full to confrontation.   Visual acuity: normal    CN III, IV, VI   Pupils are equal, round, and reactive to light.  Extraocular motions are normal.     CN V   Facial sensation intact.   Right " corneal reflex: normal  Left corneal reflex: normal    CN VII   Facial expression full, symmetric.   Right facial weakness: none  Left facial weakness: none    CN VIII   Hearing: intact    CN IX, X   Palate: symmetric    CN XI   Right sternocleidomastoid strength: normal  Left sternocleidomastoid strength: normal    CN XII   Tongue: not atrophic  Tongue deviation: none    Motor Exam   Muscle bulk: normal  Right arm tone: normal  Left arm tone: normal  Right leg tone: normal  Left leg tone: normal    Strength   Strength 5/5 except as noted.   Left gastroc: 3/5    Sensory Exam   Light touch normal.     Gait, Coordination, and Reflexes     Gait  Gait: normal    Coordination   Finger to nose coordination: normal  Heel to shin coordination: normal    Reflexes   Right brachioradialis: 2+  Left brachioradialis: 2+  Right biceps: 2+  Left biceps: 2+  Right triceps: 2+  Left triceps: 2+  Right patellar: 2+  Left patellar: 2+  Right achilles: 2+  Left achilles: 2+  Right : 2+  Left : 2+          Assessment & Plan   Independent Review of Radiographic Studies:      I personally reviewed the images from the following studies.    The lumbar MRI and x-rays done on 3/26/2024 does show some progression of the spinal stenosis at L2-L3 and L4-L5.  It was always severe at L3-L4 with a anterolisthesis at L3-L4.  There is a little bit more scoliosis on the plain films centered above L1-L2 and L2-L3.  Agree with the report.        Medical Decision Making:      I am concerned about the calf weakness.  Yet he says it does not affect his walking.  Will get an EMG/NCS to make sure there are not any other issues other than his spine.  He says his pain is manageable still and if he still able to walk 2 to 3 miles per day I am not sure that doing a decompression to try and help with the weakness is necessarily going to be necessary.  On the other hand, if he starts developing more increasing radicular pain or weakness in his tibialis  anterior, that is a different story.    Diagnoses and all orders for this visit:    1. DDD (degenerative disc disease), lumbar (Primary)  -     EMG Both Legs; Future  -     Nerve Conduction Test Both Legs; Future    2. Spinal stenosis of lumbar region with neurogenic claudication  -     EMG Both Legs; Future  -     Nerve Conduction Test Both Legs; Future    3. Chronic bilateral low back pain with bilateral sciatica  -     EMG Both Legs; Future  -     Nerve Conduction Test Both Legs; Future    4. Spondylolisthesis at L3-L4 level  -     EMG Both Legs; Future  -     Nerve Conduction Test Both Legs; Future    5. Calf muscle weakness  -     EMG Both Legs; Future  -     Nerve Conduction Test Both Legs; Future      Return in about 4 weeks (around 4/29/2024) for After EMG/NCS.

## 2024-04-01 ENCOUNTER — OFFICE VISIT (OUTPATIENT)
Dept: NEUROSURGERY | Facility: CLINIC | Age: 74
End: 2024-04-01
Payer: MEDICARE

## 2024-04-01 VITALS
WEIGHT: 195 LBS | HEIGHT: 72 IN | RESPIRATION RATE: 20 BRPM | DIASTOLIC BLOOD PRESSURE: 76 MMHG | SYSTOLIC BLOOD PRESSURE: 124 MMHG | BODY MASS INDEX: 26.41 KG/M2

## 2024-04-01 DIAGNOSIS — M48.062 SPINAL STENOSIS OF LUMBAR REGION WITH NEUROGENIC CLAUDICATION: ICD-10-CM

## 2024-04-01 DIAGNOSIS — M62.81 CALF MUSCLE WEAKNESS: ICD-10-CM

## 2024-04-01 DIAGNOSIS — M43.16 SPONDYLOLISTHESIS AT L3-L4 LEVEL: ICD-10-CM

## 2024-04-01 DIAGNOSIS — M54.41 CHRONIC BILATERAL LOW BACK PAIN WITH BILATERAL SCIATICA: ICD-10-CM

## 2024-04-01 DIAGNOSIS — G89.29 CHRONIC BILATERAL LOW BACK PAIN WITH BILATERAL SCIATICA: ICD-10-CM

## 2024-04-01 DIAGNOSIS — M54.42 CHRONIC BILATERAL LOW BACK PAIN WITH BILATERAL SCIATICA: ICD-10-CM

## 2024-04-01 DIAGNOSIS — M51.36 DDD (DEGENERATIVE DISC DISEASE), LUMBAR: Primary | ICD-10-CM

## 2024-04-01 PROCEDURE — 1159F MED LIST DOCD IN RCRD: CPT | Performed by: NEUROLOGICAL SURGERY

## 2024-04-01 PROCEDURE — 99214 OFFICE O/P EST MOD 30 MIN: CPT | Performed by: NEUROLOGICAL SURGERY

## 2024-04-01 PROCEDURE — 1160F RVW MEDS BY RX/DR IN RCRD: CPT | Performed by: NEUROLOGICAL SURGERY

## 2024-04-22 DIAGNOSIS — I25.10 CALCIFIC CORONARY ARTERIOSCLEROSIS: Primary | Chronic | ICD-10-CM

## 2024-04-22 DIAGNOSIS — E78.2 MIXED HYPERLIPIDEMIA: Chronic | ICD-10-CM

## 2024-04-23 ENCOUNTER — TREATMENT (OUTPATIENT)
Dept: PHYSICAL THERAPY | Facility: CLINIC | Age: 74
End: 2024-04-23
Payer: MEDICARE

## 2024-04-23 DIAGNOSIS — M54.42 CHRONIC BILATERAL LOW BACK PAIN WITH BILATERAL SCIATICA: Primary | ICD-10-CM

## 2024-04-23 DIAGNOSIS — G89.29 CHRONIC BILATERAL LOW BACK PAIN WITH BILATERAL SCIATICA: Primary | ICD-10-CM

## 2024-04-23 DIAGNOSIS — M54.41 CHRONIC BILATERAL LOW BACK PAIN WITH BILATERAL SCIATICA: Primary | ICD-10-CM

## 2024-04-23 PROCEDURE — 97112 NEUROMUSCULAR REEDUCATION: CPT | Performed by: PHYSICAL THERAPIST

## 2024-04-23 PROCEDURE — 97110 THERAPEUTIC EXERCISES: CPT | Performed by: PHYSICAL THERAPIST

## 2024-04-23 NOTE — PROGRESS NOTES
Physical Therapy Daily Treatment/Discharge Note    [facility]  [address] [place] [address]  [phone] (phone)  [phone] (fax)    Patient: Branden Brady   : 1950  Diagnosis/ICD-10 Code:  Chronic bilateral low back pain with bilateral sciatica [M54.42, M54.41, G89.29]  Referring practitioner: Panfilo Roland MD  Today's Date: 2024  Patient seen for 17 sessions    Visit Diagnoses:    ICD-10-CM ICD-9-CM   1. Chronic bilateral low back pain with bilateral sciatica  M54.42 724.2    M54.41 724.3    G89.29 338.29              Subjective: Greg stated that on a recent visit with Dr. Roland he experienced difficulty doing heel raises with his L LE.  He is scheduled for an EMG on 2024.      Objective     Treatment    Therapeutic exercise    Ankle plantarflexion x 20, B  Core bracing, hook lying, 30s x 5  Modified bridge, 10-30s x 10  3 part KTC stretch, 10 repetitions each position held 10 seconds  Sciatic nerve gliding x 10, B  Lower trunk rotation x 10, each side, alternating  Dowel maryellen stretch, x 10, 5 second hold  Craniovertebral, upper neck flexion x 20  Neck rotation, with the CV flexion, x 10, to each side    NMR: verbal cues for exercise were given. For neutral positioning of the lumbar spine, breathing and falguni the core for the bracing, using the core during the bridge and general technique with the KTC, nerve gliding and LTR.     NMR: verbal cues for exercise were given. For neutral positioning of the lumbar spine, breathing and falguni the core for the bracing, using the core during the bridge and general technique     Assessment & Plan       Assessment  Assessment details: Branden Brady was seen for 17 physical therapy sessions for 17.  Treatment included therapeutic exercise, manual therapy, neuro-muscular retraining , aquatic therapy, and patient education with home exercise program . Progress to physical therapy  goals was fair to good.  He was discharged to an independent HEP and provided patient education to self-manage condition.      Goals  Plan Goals:  STGs to be met in 6 weeks  1. Greg begins aquatic exercise to improve pain levels and functional movement.  Partially Met, short term benefit (~ 2 hours) following aquatic exercise but no real change in overall pain levels or specific improvement in functional movement to date.    2. He is independent with an aquatic exercise program.  (Met)  3. Greg begins core bracing and learning its application with movement and ADLs.  Ongoing/progressing (met)     LTGs to be met in 12 weeks  1. Motor control of the R C7, L L5 and S1 myotomes are not fatiguing and equal to the opposite side with MMT.  (Not met)  2, Greg is sleeping without neck or shoulder pain. (Not met)  3. He is independent with a HEP and education for self care. (Met)  4. YUMIKO deficit is reduced below 20%.  (Not met)      Plan  Plan details: Discharged from skilled physical therapy.                Timed:    Manual Therapy:         mins  68107;  Therapeutic Exercise:    35     mins  28123;     Neuromuscular Stephy:    8    mins  27730;    Therapeutic Activity:          mins  27082;     Gait Training:           mins  31099;     Ultrasound:          mins  69146;    Aquatic Therapy         mins 76624;  Self Care                       2     mins   71255        Untimed:  Electrical Stimulation:         mins  96437 ( );  Traction:         mins  04047;   Dry Needling  (1-2 muscles)                 mins 20560 (Self-pay)  Dry Needling (3-4 muscles)      20561 (Self-pay)  Dry Needling Trial         DRYNDLTRIAL  (No Charge)    Timed Treatment:   45   mins   Total Treatment:     45   mins    Gigi Wick PT  Physical Therapist    [place] License:[id number]

## 2024-04-24 ENCOUNTER — LAB (OUTPATIENT)
Facility: HOSPITAL | Age: 74
End: 2024-04-24
Payer: MEDICARE

## 2024-04-24 DIAGNOSIS — E78.2 MIXED HYPERLIPIDEMIA: ICD-10-CM

## 2024-04-24 DIAGNOSIS — R73.03 PREDIABETES: ICD-10-CM

## 2024-04-24 DIAGNOSIS — E78.2 MIXED HYPERLIPIDEMIA: Chronic | ICD-10-CM

## 2024-04-24 DIAGNOSIS — I25.10 CALCIFIC CORONARY ARTERIOSCLEROSIS: Chronic | ICD-10-CM

## 2024-04-24 DIAGNOSIS — R74.8 ELEVATED CK: ICD-10-CM

## 2024-04-24 DIAGNOSIS — E03.9 ACQUIRED HYPOTHYROIDISM: ICD-10-CM

## 2024-04-24 LAB
ALBUMIN SERPL-MCNC: 4.3 G/DL (ref 3.5–5.2)
ALBUMIN/GLOB SERPL: 1.7 G/DL
ALP SERPL-CCNC: 70 U/L (ref 39–117)
ALT SERPL W P-5'-P-CCNC: 28 U/L (ref 1–41)
ANION GAP SERPL CALCULATED.3IONS-SCNC: 7.2 MMOL/L (ref 5–15)
AST SERPL-CCNC: 43 U/L (ref 1–40)
BILIRUB SERPL-MCNC: 0.7 MG/DL (ref 0–1.2)
BUN SERPL-MCNC: 19 MG/DL (ref 8–23)
BUN/CREAT SERPL: 20.2 (ref 7–25)
CALCIUM SPEC-SCNC: 9.5 MG/DL (ref 8.6–10.5)
CHLORIDE SERPL-SCNC: 105 MMOL/L (ref 98–107)
CHOLEST SERPL-MCNC: 175 MG/DL (ref 0–200)
CK SERPL-CCNC: 900 U/L (ref 20–200)
CO2 SERPL-SCNC: 27.8 MMOL/L (ref 22–29)
CREAT SERPL-MCNC: 0.94 MG/DL (ref 0.76–1.27)
EGFRCR SERPLBLD CKD-EPI 2021: 86.7 ML/MIN/1.73
GLOBULIN UR ELPH-MCNC: 2.6 GM/DL
GLUCOSE SERPL-MCNC: 110 MG/DL (ref 65–99)
HDLC SERPL-MCNC: 80 MG/DL (ref 40–60)
LDLC SERPL CALC-MCNC: 85 MG/DL (ref 0–100)
LDLC/HDLC SERPL: 1.07 {RATIO}
POTASSIUM SERPL-SCNC: 4.7 MMOL/L (ref 3.5–5.2)
PROT SERPL-MCNC: 6.9 G/DL (ref 6–8.5)
SODIUM SERPL-SCNC: 140 MMOL/L (ref 136–145)
T4 FREE SERPL-MCNC: 0.91 NG/DL (ref 0.93–1.7)
TRIGL SERPL-MCNC: 46 MG/DL (ref 0–150)
TSH SERPL DL<=0.05 MIU/L-ACNC: 7.04 UIU/ML (ref 0.27–4.2)
VLDLC SERPL-MCNC: 10 MG/DL (ref 5–40)

## 2024-04-24 PROCEDURE — 80061 LIPID PANEL: CPT

## 2024-04-24 PROCEDURE — 84439 ASSAY OF FREE THYROXINE: CPT

## 2024-04-24 PROCEDURE — 84443 ASSAY THYROID STIM HORMONE: CPT

## 2024-04-24 PROCEDURE — 83695 ASSAY OF LIPOPROTEIN(A): CPT

## 2024-04-24 PROCEDURE — 36415 COLL VENOUS BLD VENIPUNCTURE: CPT

## 2024-04-24 PROCEDURE — 80053 COMPREHEN METABOLIC PANEL: CPT

## 2024-04-24 PROCEDURE — 82550 ASSAY OF CK (CPK): CPT

## 2024-04-25 ENCOUNTER — TELEPHONE (OUTPATIENT)
Dept: INTERNAL MEDICINE | Facility: CLINIC | Age: 74
End: 2024-04-25
Payer: MEDICARE

## 2024-04-25 LAB — LPA SERPL-SCNC: 33.9 NMOL/L

## 2024-05-13 DIAGNOSIS — E03.9 ACQUIRED HYPOTHYROIDISM: ICD-10-CM

## 2024-05-13 DIAGNOSIS — E78.2 MIXED HYPERLIPIDEMIA: Primary | ICD-10-CM

## 2024-05-13 DIAGNOSIS — R74.8 ELEVATED CK: Primary | ICD-10-CM

## 2024-05-13 LAB
BUN SERPL-MCNC: 18 MG/DL (ref 8–23)
BUN/CREAT SERPL: 19.6 (ref 7–25)
CALCIUM SERPL-MCNC: 9.6 MG/DL (ref 8.6–10.5)
CHLORIDE SERPL-SCNC: 106 MMOL/L (ref 98–107)
CK SERPL-CCNC: 827 U/L (ref 20–200)
CO2 SERPL-SCNC: 27 MMOL/L (ref 22–29)
CREAT SERPL-MCNC: 0.92 MG/DL (ref 0.76–1.27)
EGFRCR SERPLBLD CKD-EPI 2021: 88.9 ML/MIN/1.73
GLUCOSE SERPL-MCNC: 104 MG/DL (ref 65–99)
POTASSIUM SERPL-SCNC: 5.7 MMOL/L (ref 3.5–5.2)
SODIUM SERPL-SCNC: 142 MMOL/L (ref 136–145)
T4 FREE SERPL-MCNC: 0.96 NG/DL (ref 0.93–1.7)
TSH SERPL DL<=0.005 MIU/L-ACNC: 5.5 UIU/ML (ref 0.27–4.2)

## 2024-05-14 ENCOUNTER — TELEPHONE (OUTPATIENT)
Dept: INTERNAL MEDICINE | Facility: CLINIC | Age: 74
End: 2024-05-14
Payer: MEDICARE

## 2024-05-14 LAB
CHOLEST SERPL-MCNC: 237 MG/DL (ref 0–200)
HDLC SERPL-MCNC: 76 MG/DL (ref 40–60)
LDLC SERPL CALC-MCNC: 153 MG/DL (ref 0–100)
LDLC/HDLC SERPL: 1.99 {RATIO}
TRIGL SERPL-MCNC: 48 MG/DL (ref 0–150)
VLDLC SERPL CALC-MCNC: 8 MG/DL (ref 5–40)
WRITTEN AUTHORIZATION: NORMAL

## 2024-05-22 ENCOUNTER — OFFICE VISIT (OUTPATIENT)
Dept: INTERNAL MEDICINE | Facility: CLINIC | Age: 74
End: 2024-05-22
Payer: MEDICARE

## 2024-05-22 VITALS
WEIGHT: 193 LBS | DIASTOLIC BLOOD PRESSURE: 70 MMHG | HEIGHT: 72 IN | SYSTOLIC BLOOD PRESSURE: 116 MMHG | BODY MASS INDEX: 26.14 KG/M2

## 2024-05-22 DIAGNOSIS — E78.2 MIXED HYPERLIPIDEMIA: ICD-10-CM

## 2024-05-22 DIAGNOSIS — R74.8 ELEVATED CK: Primary | ICD-10-CM

## 2024-05-22 DIAGNOSIS — I25.10 CALCIFIC CORONARY ARTERIOSCLEROSIS: ICD-10-CM

## 2024-05-22 DIAGNOSIS — M60.9 STATIN-INDUCED MYOSITIS: ICD-10-CM

## 2024-05-22 DIAGNOSIS — T46.6X5A STATIN-INDUCED MYOSITIS: ICD-10-CM

## 2024-05-22 PROCEDURE — 1125F AMNT PAIN NOTED PAIN PRSNT: CPT | Performed by: FAMILY MEDICINE

## 2024-05-22 PROCEDURE — 99214 OFFICE O/P EST MOD 30 MIN: CPT | Performed by: FAMILY MEDICINE

## 2024-05-22 PROCEDURE — 1160F RVW MEDS BY RX/DR IN RCRD: CPT | Performed by: FAMILY MEDICINE

## 2024-05-22 PROCEDURE — 1159F MED LIST DOCD IN RCRD: CPT | Performed by: FAMILY MEDICINE

## 2024-05-22 PROCEDURE — G2211 COMPLEX E/M VISIT ADD ON: HCPCS | Performed by: FAMILY MEDICINE

## 2024-05-22 NOTE — PATIENT INSTRUCTIONS
"MyChart Tips:    TripChampt can be a useful part of our patient care program and is a way for us to communicate lab results and for you to request refills.  Here is some information regarding the best way to use Spootr for communication.       Examples of medical issues that are APPROPRIATE for TripChampt:  -Follow-up on problems we have already addressed in a visit such as home testing results, blood pressure readings, glucose readings  -Questions that can be answered with a simple \"yes\" or \"no\"  -Please keep communication concise and to the point.  All other types of communication should be held for an office visit.    Communication that is NOT APPROPRIATE for TripChampt:  -New problems, serious problems or urgent problems.  Urgent matters should be addressed by phone for advice, in the office, urgent care or the ER.  -Requesting Paxlovid or Antibiotics: These types of problems require an evaluation unless your provider approved this previously.    -Spootr messages are not email.  Staff will check messages each weekday.  We strive for a 48-hour turnaround on messaging.    -Spootr is not for private issues.  Messages are received first by our office staff.    Please be mindful that sometimes it may take longer to receive a response on Spootr.  Many times of the year we have high volumes of messages coming into physician inboxes.      Please also be mindful that Spootr messages become part of your permanent medical record.    We appreciate your respect for the limitations and boundaries of Spootr.      Lab Results:  Please allow up to 7 business days for lab results to be sent through Spootr to you before contacting your provider.  Sometimes we are waiting for results to get back from the lab and also your provider needs time to analyze them thoroughly before making recommendations.  Also, providers may be out of the office on vacation.      While the internet has great resources, it is not a substitute for " interpreting lab results.

## 2024-05-22 NOTE — PROGRESS NOTES
"Chief Complaint  Follow-up (Elevated CK)    Subjective        Branden Brady presents to [facility]  History of Present Illness    He is here to follow-up on recently found elevated CK likely secondary to statin medication.  We had tried decreasing the rosuvastatin dose but CK continued to climb.  As of 4/24/2024 it was 900 with normal renal function.  He was advised to stop his statin and repeat in a couple of weeks.  As of 5/13/2024 CK has decreased some to 827 with normal renal function.  We were trying to treat hyperlipidemia and calcific coronary arteriosclerosis.  LDL has climbed from 85 to 153.  He cannot take aspirin either due to stomach upset.  Dr. Bourgeois is his cardiologist.  Lipoprotein A was within normal range at 33.9.      Current Outpatient Medications:     Multiple Vitamins-Minerals (MULTIVITAMIN ADULT PO), Take  by mouth Daily., Disp: , Rfl:     tretinoin (RETIN-A) 0.05 % cream, Every Night., Disp: , Rfl: 5      Objective   Vital Signs:  /70 (BP Location: Left arm, Patient Position: Sitting, Cuff Size: Small Adult)   Ht 182.9 cm (72\")   Wt 87.5 kg (193 lb)   BMI 26.18 kg/m²   Estimated body mass index is 26.18 kg/m² as calculated from the following:    Height as of this encounter: 182.9 cm (72\").    Weight as of this encounter: 87.5 kg (193 lb).               Physical Exam  Vitals and nursing note reviewed.   Constitutional:       General: He is not in acute distress.     Appearance: Normal appearance.   Cardiovascular:      Rate and Rhythm: Normal rate and regular rhythm.      Heart sounds: Normal heart sounds. No murmur heard.  Pulmonary:      Effort: Pulmonary effort is normal.      Breath sounds: Normal breath sounds.   Neurological:      Mental Status: He is alert.        Result Review :    The following data was reviewed by: Kel Mcnally MD on 05/16/2024:  Common labs          2/29/2024    09:39 4/24/2024    09:44 5/13/2024    10:42   Common Labs "   Glucose 112  110  104    BUN 17  19  18    Creatinine 0.82  0.94  0.92    Sodium 140  140  142    Potassium 4.8  4.7  5.7    Chloride 105  105  106    Calcium 9.4  9.5  9.6    Albumin 4.2  4.3     Total Bilirubin 0.6  0.7     Alkaline Phosphatase 61  70     AST (SGOT) 34  43     ALT (SGPT) 26  28     WBC 9.11      Hemoglobin 16.2      Hematocrit 47.3      Platelets 233      Total Cholesterol 166  175     Total Cholesterol   237    Triglycerides 47  46  48    HDL Cholesterol 76  80  76    LDL Cholesterol  80  85  153    Hemoglobin A1C 5.70      PSA 2.850        Data reviewed : Consultant notes Dr Ann 4/22/2024             Assessment and Plan     Diagnoses and all orders for this visit:    1. Elevated CK (Primary)  -     CK; Future    2. Calcific coronary arteriosclerosis    3. Mixed hyperlipidemia    4. Statin-induced myositis  -     CK; Future  -     Basic Metabolic Panel; Future      Will recheck CK and BMP to see if it is safe to try a different statin medication.  If CK not down in the 300s or lower, I would say it is not yet safe to start something else.  Refer to rheumatology if CK still markedly elevated.  He was advised to avoid vigorous exercise for 12 hours prior to the lab and drink plenty of fluids.  Hyperlipidemia is worse on repeat lipid lab but there is really nothing we can do until it is safe to re-initiate therapy.           Follow Up     Return in about 3 months (around 8/19/2024).  Patient was given instructions and counseling regarding his condition or for health maintenance advice. Please see specific information pulled into the AVS if appropriate.

## 2024-05-23 ENCOUNTER — LAB (OUTPATIENT)
Facility: HOSPITAL | Age: 74
End: 2024-05-23
Payer: MEDICARE

## 2024-05-23 DIAGNOSIS — R74.8 ELEVATED CK: ICD-10-CM

## 2024-05-23 DIAGNOSIS — M60.9 STATIN-INDUCED MYOSITIS: ICD-10-CM

## 2024-05-23 DIAGNOSIS — T46.6X5A STATIN-INDUCED MYOSITIS: ICD-10-CM

## 2024-05-23 LAB
ANION GAP SERPL CALCULATED.3IONS-SCNC: 10.4 MMOL/L (ref 5–15)
BUN SERPL-MCNC: 19 MG/DL (ref 8–23)
BUN/CREAT SERPL: 18.1 (ref 7–25)
CALCIUM SPEC-SCNC: 9.7 MG/DL (ref 8.6–10.5)
CHLORIDE SERPL-SCNC: 104 MMOL/L (ref 98–107)
CK SERPL-CCNC: 459 U/L (ref 20–200)
CO2 SERPL-SCNC: 24.6 MMOL/L (ref 22–29)
CREAT SERPL-MCNC: 1.05 MG/DL (ref 0.76–1.27)
EGFRCR SERPLBLD CKD-EPI 2021: 75.4 ML/MIN/1.73
GLUCOSE SERPL-MCNC: 94 MG/DL (ref 65–99)
POTASSIUM SERPL-SCNC: 4.6 MMOL/L (ref 3.5–5.2)
SODIUM SERPL-SCNC: 139 MMOL/L (ref 136–145)

## 2024-05-23 PROCEDURE — 80048 BASIC METABOLIC PNL TOTAL CA: CPT

## 2024-05-23 PROCEDURE — 82550 ASSAY OF CK (CPK): CPT

## 2024-05-23 PROCEDURE — 36415 COLL VENOUS BLD VENIPUNCTURE: CPT

## 2024-05-24 DIAGNOSIS — R74.8 ELEVATED CK: Primary | ICD-10-CM

## 2024-05-29 ENCOUNTER — TRANSCRIBE ORDERS (OUTPATIENT)
Dept: ADMINISTRATIVE | Facility: HOSPITAL | Age: 74
End: 2024-05-29
Payer: MEDICARE

## 2024-05-29 ENCOUNTER — PROCEDURE VISIT (OUTPATIENT)
Dept: NEUROLOGY | Facility: CLINIC | Age: 74
End: 2024-05-29
Payer: MEDICARE

## 2024-05-29 VITALS
SYSTOLIC BLOOD PRESSURE: 120 MMHG | OXYGEN SATURATION: 98 % | HEART RATE: 53 BPM | HEIGHT: 72 IN | BODY MASS INDEX: 26.17 KG/M2 | DIASTOLIC BLOOD PRESSURE: 72 MMHG

## 2024-05-29 DIAGNOSIS — M51.36 DDD (DEGENERATIVE DISC DISEASE), LUMBAR: ICD-10-CM

## 2024-05-29 DIAGNOSIS — M54.42 CHRONIC BILATERAL LOW BACK PAIN WITH BILATERAL SCIATICA: ICD-10-CM

## 2024-05-29 DIAGNOSIS — M43.16 SPONDYLOLISTHESIS AT L3-L4 LEVEL: ICD-10-CM

## 2024-05-29 DIAGNOSIS — M62.81 CALF MUSCLE WEAKNESS: ICD-10-CM

## 2024-05-29 DIAGNOSIS — M48.062 SPINAL STENOSIS OF LUMBAR REGION WITH NEUROGENIC CLAUDICATION: ICD-10-CM

## 2024-05-29 DIAGNOSIS — I71.21 AORTIC ROOT ANEURYSM: Primary | ICD-10-CM

## 2024-05-29 DIAGNOSIS — G89.29 CHRONIC BILATERAL LOW BACK PAIN WITH BILATERAL SCIATICA: ICD-10-CM

## 2024-05-29 DIAGNOSIS — M54.41 CHRONIC BILATERAL LOW BACK PAIN WITH BILATERAL SCIATICA: ICD-10-CM

## 2024-05-29 PROCEDURE — 95886 MUSC TEST DONE W/N TEST COMP: CPT | Performed by: PSYCHIATRY & NEUROLOGY

## 2024-05-29 PROCEDURE — 95909 NRV CNDJ TST 5-6 STUDIES: CPT | Performed by: PSYCHIATRY & NEUROLOGY

## 2024-05-29 NOTE — PROGRESS NOTES
EMG and Nerve Conduction Studies      History: 74-year-old gentleman complaining of bilateral lower extremity weakness.      Results: Nerve conduction studies were performed on both lower extremities.  Left sural sensory nerve action potentials were absent.  Right sural sensory nerve action potentials were prolonged in distal latency and reduced in velocity.  Bilateral fibular compound motor action potentials were normal.  Bilateral tibial compound motor action potentials were reduced in expected amplitude.  Left fibular and left tibial F waves are present but prolonged in expected latency.  Right fibular and tibial F waves are present but borderline in expected latency.  Tibial H reflexes are present and reasonably symmetric.    EMG needle examination of selected muscles of both lower extremities and lumbosacral paraspinal muscles revealed increased insertional activity in medial gastrocnemius bilaterally, with fibrillation potentials noted on the left.  Some increase in insertional activity was suspected in the mid to lower left lumbosacral paraspinal muscles.  The rest of the sampled muscles revealed no abnormal insertional activity, spontaneous activity, or motor unit remodeling.  Please see accompanying data for details.    Impression: This nerve conduction study and EMG needle examination of the lower extremities is consistent with a sensorimotor peripheral polyneuropathy.  I suspect there is also superimposed left S1 radiculopathy, although this can be difficult to confirm in the presence of a polyneuropathy.    Sarath Santana M.D.              Dictated utilizing Dragon dictation.

## 2024-06-05 ENCOUNTER — PATIENT ROUNDING (BHMG ONLY) (OUTPATIENT)
Dept: NEUROLOGY | Facility: CLINIC | Age: 74
End: 2024-06-05
Payer: MEDICARE

## 2024-06-06 ENCOUNTER — TELEPHONE (OUTPATIENT)
Dept: NEUROSURGERY | Facility: CLINIC | Age: 74
End: 2024-06-06
Payer: MEDICARE

## 2024-06-06 ENCOUNTER — TELEPHONE (OUTPATIENT)
Dept: NEUROLOGY | Facility: CLINIC | Age: 74
End: 2024-06-06
Payer: MEDICARE

## 2024-06-06 DIAGNOSIS — E78.2 MIXED HYPERLIPIDEMIA: ICD-10-CM

## 2024-06-06 DIAGNOSIS — I25.10 CALCIFIC CORONARY ARTERIOSCLEROSIS: ICD-10-CM

## 2024-06-06 DIAGNOSIS — R74.8 ELEVATED CK: Primary | ICD-10-CM

## 2024-06-06 NOTE — TELEPHONE ENCOUNTER
Patient called in had went to have EMG done for DR. Roland but when he( the patient) went to look at results of EMG he noticed the name on results was not his name. He has reached back out to that physician office and now they have gotten the Epic team involved. He would like just to retake the EMG procedure over again to make sure they have correct testing/results on him. He would like to let DR. Moore know in case he reviews his chart. Also patient is requesting notification of what DR. Roland says.

## 2024-06-18 NOTE — PROGRESS NOTES
"Subjective   Patient ID: Branden Brady is a 74 y.o. male is here today for follow-up after EMG    He is here for follow-up.  His EMG/NCS did show some mild evidence of a sensory peripheral neuropathy but the main finding seem to be some spinal involvement involving the S1 nerve root on the left which I think is r he still is functional and still does not really have that much pain.  Elated to  is stenosis.  Again the weakness is a little bit better compared to last time, Now being 4/5.  I am a bit reluctant to do a decompression and fusion at L3-L4 if he is functional and his strength is improving.  That may change but I think we should follow him for now and see him in 5 months.  I showed him how to do exercises for his calf including isolation calf raises.  If things get worse in terms of pain or weakness or both and he will let me know and then we should probably go ahead and do the surgery.  Now, I do not think, is the appropriate time.      History of Present Illness    The following portions of the patient's history were reviewed and updated as appropriate: allergies, current medications, past family history, past medical history, past social history, past surgical history, and problem list.    Review of Systems   Constitutional:  Negative for fever.   Musculoskeletal:  Positive for back pain and gait problem.   Neurological:  Positive for weakness and numbness.   All other systems reviewed and are negative.          Objective     Vitals:    07/08/24 1102   BP: 148/82   BP Location: Left arm   Patient Position: Sitting   Cuff Size: Adult   Resp: 20   Weight: 87.5 kg (193 lb)   Height: 182.9 cm (72.01\")   PainSc:   3     Body mass index is 26.17 kg/m².    Tobacco Use: Low Risk  (7/8/2024)    Patient History     Smoking Tobacco Use: Never     Smokeless Tobacco Use: Never     Passive Exposure: Not on file          Physical Exam  Constitutional:       Appearance: He is well-developed.   HENT:      Head: " Normocephalic and atraumatic.   Eyes:      Extraocular Movements: EOM normal.      Conjunctiva/sclera: Conjunctivae normal.      Pupils: Pupils are equal, round, and reactive to light.   Neck:      Vascular: No carotid bruit.   Neurological:      Mental Status: He is oriented to person, place, and time.      Coordination: Finger-Nose-Finger Test and Heel to Shin Test normal.      Gait: Gait is intact.      Deep Tendon Reflexes:      Reflex Scores:       Tricep reflexes are 2+ on the right side and 2+ on the left side.       Bicep reflexes are 2+ on the right side and 2+ on the left side.       Brachioradialis reflexes are 2+ on the right side and 2+ on the left side.       Patellar reflexes are 2+ on the right side and 2+ on the left side.       Achilles reflexes are 2+ on the right side and 2+ on the left side.  Psychiatric:         Speech: Speech normal.       Neurologic Exam     Mental Status   Oriented to person, place, and time.   Registration of memory: Good recent and remote memory.   Attention: normal. Concentration: normal.   Speech: speech is normal   Level of consciousness: alert  Knowledge: consistent with education.     Cranial Nerves     CN II   Visual fields full to confrontation.   Visual acuity: normal    CN III, IV, VI   Pupils are equal, round, and reactive to light.  Extraocular motions are normal.     CN V   Facial sensation intact.   Right corneal reflex: normal  Left corneal reflex: normal    CN VII   Facial expression full, symmetric.   Right facial weakness: none  Left facial weakness: none    CN VIII   Hearing: intact    CN IX, X   Palate: symmetric    CN XI   Right sternocleidomastoid strength: normal  Left sternocleidomastoid strength: normal    CN XII   Tongue: not atrophic  Tongue deviation: none    Motor Exam   Muscle bulk: normal  Right arm tone: normal  Left arm tone: normal  Right leg tone: normal  Left leg tone: normal    Strength   Strength 5/5 except as noted.   Left gastroc:  4/5    Sensory Exam   Light touch normal.     Gait, Coordination, and Reflexes     Gait  Gait: normal    Coordination   Finger to nose coordination: normal  Heel to shin coordination: normal    Reflexes   Right brachioradialis: 2+  Left brachioradialis: 2+  Right biceps: 2+  Left biceps: 2+  Right triceps: 2+  Left triceps: 2+  Right patellar: 2+  Left patellar: 2+  Right achilles: 2+  Left achilles: 2+  Right : 2+  Left : 2+          Assessment & Plan   Independent Review of Radiographic Studies:      I personally reviewed the images from the following studies.    The lumbar MRI and x-rays done on 3/26/2024 does show some progression of the spinal stenosis at L2-L3 and L4-L5. It was always severe at L3-L4 with a anterolisthesis at L3-L4. There is a little bit more scoliosis on the plain films centered above L1-L2 and L2-L3. Agree with the report.     Medical Decision Making    The Weakness in the calf Is manageable and he still feels functional.  Will continue to watch him and see him in 5 months.  If weakness gets worse or he has more pain then he will let me know and we will move forward with an L3-L4 decompression and fusion    Diagnoses and all orders for this visit:    1. DDD (degenerative disc disease), lumbar (Primary)    2. Spondylolisthesis at L3-L4 level    3. Calf muscle weakness    4. Spinal stenosis of lumbar region with neurogenic claudication    5. Chronic bilateral low back pain with bilateral sciatica      Return in about 5 months (around 12/8/2024) for Face-to-face.

## 2024-06-20 ENCOUNTER — LAB (OUTPATIENT)
Facility: HOSPITAL | Age: 74
End: 2024-06-20
Payer: MEDICARE

## 2024-06-20 DIAGNOSIS — R74.8 ELEVATED CK: ICD-10-CM

## 2024-06-20 DIAGNOSIS — I25.10 CALCIFIC CORONARY ARTERIOSCLEROSIS: ICD-10-CM

## 2024-06-20 DIAGNOSIS — E78.2 MIXED HYPERLIPIDEMIA: ICD-10-CM

## 2024-06-20 LAB
ANION GAP SERPL CALCULATED.3IONS-SCNC: 5.8 MMOL/L (ref 5–15)
BUN SERPL-MCNC: 23 MG/DL (ref 8–23)
BUN/CREAT SERPL: 22.3 (ref 7–25)
CALCIUM SPEC-SCNC: 9.4 MG/DL (ref 8.6–10.5)
CHLORIDE SERPL-SCNC: 107 MMOL/L (ref 98–107)
CHOLEST SERPL-MCNC: 252 MG/DL (ref 0–200)
CK SERPL-CCNC: 676 U/L (ref 20–200)
CO2 SERPL-SCNC: 25.2 MMOL/L (ref 22–29)
CREAT SERPL-MCNC: 1.03 MG/DL (ref 0.76–1.27)
EGFRCR SERPLBLD CKD-EPI 2021: 77.2 ML/MIN/1.73
GLUCOSE SERPL-MCNC: 107 MG/DL (ref 65–99)
HDLC SERPL-MCNC: 80 MG/DL (ref 40–60)
LDLC SERPL CALC-MCNC: 165 MG/DL (ref 0–100)
LDLC/HDLC SERPL: 2.04 {RATIO}
POTASSIUM SERPL-SCNC: 5.1 MMOL/L (ref 3.5–5.2)
SODIUM SERPL-SCNC: 138 MMOL/L (ref 136–145)
TRIGL SERPL-MCNC: 46 MG/DL (ref 0–150)
VLDLC SERPL-MCNC: 7 MG/DL (ref 5–40)

## 2024-06-20 PROCEDURE — 80061 LIPID PANEL: CPT

## 2024-06-20 PROCEDURE — 80048 BASIC METABOLIC PNL TOTAL CA: CPT

## 2024-06-20 PROCEDURE — 82550 ASSAY OF CK (CPK): CPT

## 2024-06-20 PROCEDURE — 36415 COLL VENOUS BLD VENIPUNCTURE: CPT

## 2024-07-01 DIAGNOSIS — E78.2 MIXED HYPERLIPIDEMIA: Primary | ICD-10-CM

## 2024-07-08 ENCOUNTER — OFFICE VISIT (OUTPATIENT)
Dept: NEUROSURGERY | Facility: CLINIC | Age: 74
End: 2024-07-08
Payer: MEDICARE

## 2024-07-08 VITALS
WEIGHT: 193 LBS | SYSTOLIC BLOOD PRESSURE: 148 MMHG | BODY MASS INDEX: 26.14 KG/M2 | RESPIRATION RATE: 20 BRPM | DIASTOLIC BLOOD PRESSURE: 82 MMHG | HEIGHT: 72 IN

## 2024-07-08 DIAGNOSIS — M51.36 DDD (DEGENERATIVE DISC DISEASE), LUMBAR: Primary | ICD-10-CM

## 2024-07-08 DIAGNOSIS — G89.29 CHRONIC BILATERAL LOW BACK PAIN WITH BILATERAL SCIATICA: ICD-10-CM

## 2024-07-08 DIAGNOSIS — M62.81 CALF MUSCLE WEAKNESS: ICD-10-CM

## 2024-07-08 DIAGNOSIS — M54.41 CHRONIC BILATERAL LOW BACK PAIN WITH BILATERAL SCIATICA: ICD-10-CM

## 2024-07-08 DIAGNOSIS — M43.16 SPONDYLOLISTHESIS AT L3-L4 LEVEL: ICD-10-CM

## 2024-07-08 DIAGNOSIS — M48.062 SPINAL STENOSIS OF LUMBAR REGION WITH NEUROGENIC CLAUDICATION: ICD-10-CM

## 2024-07-08 DIAGNOSIS — M54.42 CHRONIC BILATERAL LOW BACK PAIN WITH BILATERAL SCIATICA: ICD-10-CM

## 2024-07-17 ENCOUNTER — HOSPITAL ENCOUNTER (OUTPATIENT)
Dept: CT IMAGING | Facility: HOSPITAL | Age: 74
Discharge: HOME | End: 2024-07-17
Admitting: THORACIC SURGERY (CARDIOTHORACIC VASCULAR SURGERY)
Payer: MEDICARE

## 2024-07-17 DIAGNOSIS — I71.21 AORTIC ROOT ANEURYSM: ICD-10-CM

## 2024-07-17 PROCEDURE — 71250 CT THORAX DX C-: CPT

## 2024-07-29 ENCOUNTER — TELEPHONE (OUTPATIENT)
Dept: INTERNAL MEDICINE | Facility: CLINIC | Age: 74
End: 2024-07-29
Payer: MEDICARE

## 2024-07-29 NOTE — TELEPHONE ENCOUNTER
Branden Brady called would like to go downstairs for fasting laboratory tests today but would need Dr Mcnally to order laboratory test ck level,lipid,cmp

## 2024-07-30 NOTE — TELEPHONE ENCOUNTER
I would advise him to get labs done at appointment.   Last note showed to get CK and BMP which were done.

## 2024-07-30 NOTE — TELEPHONE ENCOUNTER
Patient is asking for an update on this. Advised that Dr Mcnally is out of the office. Advised he may have to get labs at [facility].

## 2024-07-31 NOTE — TELEPHONE ENCOUNTER
Spoke with pt and he is needing the labs done before that's what the appointment on Tuesday is for to go over those results are you okay adding these labs for him  did agree to add labs I think he may have forgotten

## 2024-07-31 NOTE — TELEPHONE ENCOUNTER
I am not sure what labs Dr. Mcnally was wanting as I see the CK and BMP, but pt has gotten cholesterol drawn almost monthly, so not sure. I would recommend waiting until Monday and seeing if he can place the orders then.

## 2024-08-02 ENCOUNTER — TRANSCRIBE ORDERS (OUTPATIENT)
Dept: ADMINISTRATIVE | Facility: HOSPITAL | Age: 74
End: 2024-08-02
Payer: MEDICARE

## 2024-08-02 ENCOUNTER — LAB (OUTPATIENT)
Facility: HOSPITAL | Age: 74
End: 2024-08-02
Payer: MEDICARE

## 2024-08-02 DIAGNOSIS — R74.8 ELEVATED CREATINE KINASE: ICD-10-CM

## 2024-08-02 DIAGNOSIS — E78.2 MIXED HYPERLIPIDEMIA: ICD-10-CM

## 2024-08-02 DIAGNOSIS — I25.10 CALCIFIC CORONARY ARTERIOSCLEROSIS: Primary | ICD-10-CM

## 2024-08-02 DIAGNOSIS — I25.10 CALCIFIC CORONARY ARTERIOSCLEROSIS: ICD-10-CM

## 2024-08-02 LAB
ANION GAP SERPL CALCULATED.3IONS-SCNC: 12 MMOL/L (ref 5–15)
BUN SERPL-MCNC: 17 MG/DL (ref 8–23)
BUN/CREAT SERPL: 18.7 (ref 7–25)
CALCIUM SPEC-SCNC: 9.4 MG/DL (ref 8.6–10.5)
CHLORIDE SERPL-SCNC: 102 MMOL/L (ref 98–107)
CHOLEST SERPL-MCNC: 251 MG/DL (ref 0–200)
CK SERPL-CCNC: 872 U/L (ref 20–200)
CO2 SERPL-SCNC: 24 MMOL/L (ref 22–29)
CREAT SERPL-MCNC: 0.91 MG/DL (ref 0.76–1.27)
EGFRCR SERPLBLD CKD-EPI 2021: 89.5 ML/MIN/1.73
GLUCOSE SERPL-MCNC: 101 MG/DL (ref 65–99)
HDLC SERPL-MCNC: 82 MG/DL (ref 40–60)
LDLC SERPL CALC-MCNC: 161 MG/DL (ref 0–100)
LDLC/HDLC SERPL: 1.93 {RATIO}
POTASSIUM SERPL-SCNC: 4.7 MMOL/L (ref 3.5–5.2)
SODIUM SERPL-SCNC: 138 MMOL/L (ref 136–145)
TRIGL SERPL-MCNC: 53 MG/DL (ref 0–150)
VLDLC SERPL-MCNC: 8 MG/DL (ref 5–40)

## 2024-08-02 PROCEDURE — 80048 BASIC METABOLIC PNL TOTAL CA: CPT

## 2024-08-02 PROCEDURE — 36415 COLL VENOUS BLD VENIPUNCTURE: CPT

## 2024-08-02 PROCEDURE — 82550 ASSAY OF CK (CPK): CPT

## 2024-08-02 PROCEDURE — 80061 LIPID PANEL: CPT

## 2024-08-06 ENCOUNTER — OFFICE VISIT (OUTPATIENT)
Dept: INTERNAL MEDICINE | Facility: CLINIC | Age: 74
End: 2024-08-06
Payer: MEDICARE

## 2024-08-06 VITALS
OXYGEN SATURATION: 96 % | SYSTOLIC BLOOD PRESSURE: 122 MMHG | WEIGHT: 193 LBS | RESPIRATION RATE: 18 BRPM | HEART RATE: 62 BPM | BODY MASS INDEX: 26.14 KG/M2 | HEIGHT: 72 IN | DIASTOLIC BLOOD PRESSURE: 82 MMHG

## 2024-08-06 DIAGNOSIS — E78.2 MIXED HYPERLIPIDEMIA: Primary | ICD-10-CM

## 2024-08-06 DIAGNOSIS — E78.2 MIXED HYPERLIPIDEMIA: Primary | Chronic | ICD-10-CM

## 2024-08-06 DIAGNOSIS — R74.8 ELEVATED CK: ICD-10-CM

## 2024-08-06 RX ORDER — EZETIMIBE 10 MG/1
10 TABLET ORAL DAILY
COMMUNITY

## 2024-08-06 NOTE — PROGRESS NOTES
"Chief Complaint  Follow-up and Labs Only (Ck level )    Subjective        Branden Brady presents to [facility]  History of Present Illness    He is here to follow-up on his elevated CK and hyperlipidemia.  His cardiologist is going to start him on Zetia 10 mg.  They are planning on trying to get him a consult with either [facility] or [place] for the elevated CK.  He currently has a pending endocrinology appointment next month with [facility].  His CK levels did increase from June to August without the use of any medication.  Most recent his CK jumped from 676 in June to 872 in August.  He has not taken the ezetimibe for 3 doses.      Current Outpatient Medications:     ezetimibe (ZETIA) 10 MG tablet, Take 1 tablet by mouth Daily., Disp: , Rfl:     Multiple Vitamins-Minerals (MULTIVITAMIN ADULT PO), Take  by mouth Daily., Disp: , Rfl:     tretinoin (RETIN-A) 0.05 % cream, Every Night., Disp: , Rfl: 5      Objective   Vital Signs:  /82 (BP Location: Left arm, Patient Position: Sitting, Cuff Size: Small Adult)   Pulse 62   Resp 18   Ht 182.9 cm (72\")   Wt 87.5 kg (193 lb)   SpO2 96%   BMI 26.18 kg/m²   Estimated body mass index is 26.18 kg/m² as calculated from the following:    Height as of this encounter: 182.9 cm (72\").    Weight as of this encounter: 87.5 kg (193 lb).             Physical Exam  Vitals and nursing note reviewed.   Constitutional:       General: He is not in acute distress.     Appearance: Normal appearance.   Cardiovascular:      Rate and Rhythm: Normal rate and regular rhythm.      Heart sounds: Normal heart sounds. No murmur heard.  Pulmonary:      Effort: Pulmonary effort is normal.      Breath sounds: Normal breath sounds.   Neurological:      Mental Status: He is alert.        Result Review :    The following data was reviewed by: Kel Mcnally MD on 08/06/2024:  Common labs          5/23/2024    13:56 6/20/2024    09:47 " 8/2/2024    11:05   Common Labs   Glucose 94  107  101    BUN 19  23  17    Creatinine 1.05  1.03  0.91    Sodium 139  138  138    Potassium 4.6  5.1  4.7    Chloride 104  107  102    Calcium 9.7  9.4  9.4    Total Cholesterol  252  251    Triglycerides  46  53    HDL Cholesterol  80  82    LDL Cholesterol   165  161       (8/2/2024), 676 (6/20/2024), 459 (5/23/2024)                 Assessment and Plan     Diagnoses and all orders for this visit:    1. Mixed hyperlipidemia (Primary)    2. Elevated CK      My thought would be to get a BMP and CK in a couple of weeks to see if there are many major changes.  We will get those orders put in the system today.  If his CK jumps again, I would recommend stopping the ezetimibe and pursuing something like Repatha or Praluent through the endocrinology/lipid clinic.  He is going to look into having his elevated baseline CK issue checked since rheumatology and neurology have not given a good explanation.  He is going to look into [facility] and/or [place].  I think [facility] would not be a bad idea either.  Currently is cholesterol is elevated and needs treatment for his history of coronary artery disease but we are very limited what we can do with the CK levels.  LDL currently 161.           Follow Up     Return if symptoms worsen or fail to improve.  Patient was given instructions and counseling regarding his condition or for health maintenance advice. Please see specific information pulled into the AVS if appropriate.

## 2024-08-06 NOTE — PATIENT INSTRUCTIONS
"MyChart Tips:    [email or web address] can be a useful part of our patient care program and is a way for us to communicate lab results and for you to request refills.  Here is some information regarding the best way to use [employer] for communication.       Examples of medical issues that are APPROPRIATE for [email or web address]:  -Follow-up on problems we have already addressed in a visit such as home testing results, blood pressure readings, glucose readings  -Questions that can be answered with a simple \"yes\" or \"no\"  -Please keep communication concise and to the point.  All other types of communication should be held for an office visit.    Communication that is NOT APPROPRIATE for [email or web address]:  -New problems, serious problems or urgent problems.  Urgent matters should be addressed by phone for advice, in the office, urgent care or the ER.  -Requesting Paxlovid or Antibiotics: These types of problems require an evaluation unless your provider approved this previously.    -Hoopla messages are not email.  Staff will check messages each weekday.  We strive for a 48-hour turnaround on messaging.    -Hoopla is not for private issues.  Messages are received first by our office staff.    Please be mindful that sometimes it may take longer to receive a response on Hoopla.  Many times of the year we have high volumes of messages coming into physician inboxes.      Please also be mindful that Hoopla messages become part of your permanent medical record.    We appreciate your respect for the limitations and boundaries of [employer]la.      Lab Results:  Please allow up to 7 business days for lab results to be sent through Hoopla to you before contacting your provider.  Sometimes we are waiting for results to get back from the lab and also your provider needs time to analyze them thoroughly before making recommendations.  Also, providers may be out of the office on vacation.      While the internet has great resources, it is not a substitute for " interpreting lab results.

## 2024-08-26 ENCOUNTER — LAB (OUTPATIENT)
Facility: HOSPITAL | Age: 74
End: 2024-08-26
Payer: MEDICARE

## 2024-08-26 PROCEDURE — 80048 BASIC METABOLIC PNL TOTAL CA: CPT

## 2024-08-26 PROCEDURE — 82550 ASSAY OF CK (CPK): CPT

## 2024-11-25 NOTE — PROGRESS NOTES
Subjective   Patient ID: Branden Brady is a 74 y.o. male is here today for follow-up.    Is very nice gentleman is healthy and remains active.  He has some severe spinal stenosis at L3-L4 with a spondylolisthesis but no significant pain other than back pain when he stands for too long.  He works part-time at [employer] and he does a lot of standing.  I told him to use an [place] LSO purchased on [employer].  The calf weakness is about the same.  Again the leg does not hurt and he feels functional.  I do not think we need to proceed on the basis of this now baseline cath weakness since he is able to walk and really does not notice how weak he is and thus I asked him to walk on his tiptoes.  Will keep an eye on him however and renew his physical therapy at [facility].  I will see him in 7 months with x-rays.    History of Present Illness    The following portions of the patient's history were reviewed and updated as appropriate: allergies, current medications, past family history, past medical history, past social history, past surgical history, and problem list.    Review of Systems   Constitutional:  Negative for fever.   Musculoskeletal:  Positive for back pain. Negative for gait problem.   Neurological:  Positive for numbness. Negative for dizziness, weakness, light-headedness and headaches.   All other systems reviewed and are negative.      Objective   Physical Exam  Constitutional:       General: He is awake.      Appearance: He is well-developed.   HENT:      Head: Normocephalic and atraumatic.   Eyes:      General: Lids are normal.      Extraocular Movements: Extraocular movements intact.      Conjunctiva/sclera: Conjunctivae normal.      Pupils: Pupils are equal, round, and reactive to light.   Neck:      Vascular: No carotid bruit.   Neurological:      Mental Status: He is alert.      Coordination: Coordination is intact.      Deep Tendon Reflexes:      Reflex Scores:       Tricep reflexes are 2+ on the  right side and 2+ on the left side.       Bicep reflexes are 2+ on the right side and 2+ on the left side.       Brachioradialis reflexes are 2+ on the right side and 2+ on the left side.       Patellar reflexes are 2+ on the right side and 2+ on the left side.       Achilles reflexes are 2+ on the right side and 2+ on the left side.  Psychiatric:         Speech: Speech normal.       Neurological Exam  Mental Status  Awake and alert. Oriented only to person, place, time and situation. Recent and remote memory are intact. Speech is normal. Language is fluent with no aphasia. Attention and concentration are normal. Fund of knowledge is appropriate for level of education.    Cranial Nerves  CN II: Visual acuity is normal. Visual fields full to confrontation.  CN III, IV, VI: Extraocular movements intact bilaterally. Normal lids and orbits bilaterally. Pupils equal round and reactive to light bilaterally.  CN V: Facial sensation is normal.  CN VII: Full and symmetric facial movement.  CN IX, X: Palate elevates symmetrically. Normal gag reflex.  CN XI: Shoulder shrug strength is normal.  CN XII: Tongue midline without atrophy or fasciculations.    Motor  Normal muscle bulk throughout. Normal muscle tone.                                               Right                     Left  Rhomboids                            5                          5  Infraspinatus                          5                          5  Supraspinatus                       5                          5  Deltoid                                   5                          5   Biceps                                   5                          5  Brachioradialis                      5                          5   Triceps                                  5                          5   Pronator                                5                          5   Supinator                              5                           5   Wrist flexor                             5                          5   Wrist extensor                       5                          5   Finger flexor                          5                          5   Finger extensor                     5                          5   Interossei                              5                          5   Abductor pollicis brevis         5                          5   Flexor pollicis brevis             5                          5   Opponens pollicis                 5                          5  Extensor digitorum               5                          5  Abductor digiti minimi           5                          5   Abdominal                            5                          5  Glutei                                    5                          5  Hip abductor                         5                          5  Hip adductor                         5                          5   Iliopsoas                               5                          5   Quadriceps                           5                          5   Hamstring                             5                          5   Gastrocnemius                     5                           3   Anterior tibialis                      5                          5   Posterior tibialis                    5                          5   Peroneal                               5                          5  Ankle dorsiflexor                   5                          5  Ankle plantar flexor              5                           5  Extensor hallucis longus      5                           5    Sensory  Light touch is normal in upper and lower extremities. Proprioception is normal in upper and lower extremities.     Reflexes                                            Right                      Left  Brachioradialis                    2+                         2+  Biceps                                 2+                         2+  Triceps                                 2+                         2+  Finger flex                           2+                         2+  Hamstring                            2+                         2+  Patellar                                2+                         2+  Achilles                                2+                         2+    Coordination    Finger-to-nose, rapid alternating movements and heel-to-shin normal bilaterally without dysmetria.    Gait  Casual gait is normal including stance, stride, and arm swing.Normal toe walking. Normal heel walking. Normal tandem gait.       Assessment & Plan   Independent Review of Radiographic Studies:      The lumbar MRI and x-rays done on 3/26/2024 does show some progression of the spinal stenosis at L2-L3 and L4-L5. It was always severe at L3-L4 with a anterolisthesis at L3-L4. There is a little bit more scoliosis on the plain films centered above L1-L2 and L2-L3. Agree with the report.     Medical Decision Making:      Will renew his physical therapy and have him come back to see me about 7 months with x-rays.  If situation changes with more weakness or more pain, he will let us know.  He also look into getting an [place] LSO through [employer].    Diagnoses and all orders for this visit:    1. Degeneration of intervertebral disc of lumbar region with discogenic back pain (Primary)  -     Ambulatory Referral to Physical Therapy for Evaluation & Treatment  -     XR Spine Lumbar Complete With Flex & Ext; Future    2. Spondylolisthesis at L3-L4 level  -     Ambulatory Referral to Physical Therapy for Evaluation & Treatment  -     XR Spine Lumbar Complete With Flex & Ext; Future    3. Calf muscle weakness  -     Ambulatory Referral to Physical Therapy for Evaluation & Treatment  -     XR Spine Lumbar Complete With Flex & Ext; Future    4. Spinal stenosis of lumbar region with neurogenic claudication  -     Ambulatory Referral to Physical Therapy for Evaluation & Treatment  -      XR Spine Lumbar Complete With Flex & Ext; Future      Return in about 7 months (around 7/2/2025) for Face-to-face.

## 2024-12-02 ENCOUNTER — OFFICE VISIT (OUTPATIENT)
Dept: NEUROSURGERY | Facility: CLINIC | Age: 74
End: 2024-12-02
Payer: MEDICARE

## 2024-12-02 VITALS
SYSTOLIC BLOOD PRESSURE: 128 MMHG | BODY MASS INDEX: 26.14 KG/M2 | WEIGHT: 193 LBS | RESPIRATION RATE: 20 BRPM | DIASTOLIC BLOOD PRESSURE: 78 MMHG | HEIGHT: 72 IN

## 2024-12-02 DIAGNOSIS — M51.360 DEGENERATION OF INTERVERTEBRAL DISC OF LUMBAR REGION WITH DISCOGENIC BACK PAIN: Primary | ICD-10-CM

## 2024-12-02 DIAGNOSIS — M62.81 CALF MUSCLE WEAKNESS: ICD-10-CM

## 2024-12-02 DIAGNOSIS — M48.062 SPINAL STENOSIS OF LUMBAR REGION WITH NEUROGENIC CLAUDICATION: ICD-10-CM

## 2024-12-02 DIAGNOSIS — M43.16 SPONDYLOLISTHESIS AT L3-L4 LEVEL: ICD-10-CM

## 2024-12-02 PROCEDURE — 99213 OFFICE O/P EST LOW 20 MIN: CPT | Performed by: NEUROLOGICAL SURGERY

## 2024-12-02 PROCEDURE — 1159F MED LIST DOCD IN RCRD: CPT | Performed by: NEUROLOGICAL SURGERY

## 2024-12-02 PROCEDURE — 1160F RVW MEDS BY RX/DR IN RCRD: CPT | Performed by: NEUROLOGICAL SURGERY

## 2024-12-02 RX ORDER — EVOLOCUMAB 140 MG/ML
140 INJECTION, SOLUTION SUBCUTANEOUS
COMMUNITY
Start: 2024-08-14

## 2024-12-02 RX ORDER — LEVOTHYROXINE SODIUM 50 UG/1
TABLET ORAL
COMMUNITY
Start: 2024-11-06

## 2024-12-02 RX ORDER — LEVOTHYROXINE SODIUM 25 UG/1
TABLET ORAL
COMMUNITY
Start: 2024-09-30

## 2025-01-10 ENCOUNTER — TREATMENT (OUTPATIENT)
Dept: PHYSICAL THERAPY | Facility: CLINIC | Age: 75
End: 2025-01-10
Payer: MEDICARE

## 2025-01-10 DIAGNOSIS — R29.898 LOSS OF MOVEMENT: ICD-10-CM

## 2025-01-10 DIAGNOSIS — R53.1 STRENGTH LOSS OF: ICD-10-CM

## 2025-01-10 DIAGNOSIS — G89.29 CHRONIC BILATERAL LOW BACK PAIN WITH BILATERAL SCIATICA: Primary | ICD-10-CM

## 2025-01-10 DIAGNOSIS — M21.42 PES PLANUS OF BOTH FEET: ICD-10-CM

## 2025-01-10 DIAGNOSIS — M54.42 CHRONIC BILATERAL LOW BACK PAIN WITH BILATERAL SCIATICA: Primary | ICD-10-CM

## 2025-01-10 DIAGNOSIS — M54.41 CHRONIC BILATERAL LOW BACK PAIN WITH BILATERAL SCIATICA: Primary | ICD-10-CM

## 2025-01-10 DIAGNOSIS — R29.3 POOR POSTURE: ICD-10-CM

## 2025-01-10 DIAGNOSIS — M21.41 PES PLANUS OF BOTH FEET: ICD-10-CM

## 2025-01-10 NOTE — PROGRESS NOTES
Physical Therapy Initial Evaluation and Plan of Care      Patient: Branden Brady   : 1950  Diagnosis/ICD-10 Code:  Chronic bilateral low back pain with bilateral sciatica [M54.42, M54.41, G89.29]  Referring practitioner: Panfilo Roland MD  Date of Initial Visit: 1/10/2025  Today's Date: 1/10/2025  Patient seen for 1 sessions           Subjective: Greg reports today as outpatient with complaints of chronic bilateral low back pain, with bilateral sciatica.  Recently peak discomfort has reached 7/10.  Pain is intermittently in the low back, or down the posterior thighs and occasionally into his lower legs.  Infrequently he will notice numbness and tingling into his legs.  Greg feels that this pain makes him move slower and more cautiously.  On a bad day he will avoid lifting anything substantial, lifting more than 20 lbs., sitting tolerance is reduced, normally if he sits for 45 minutes it will hurt once he transfers up to standing.  Walking is painful and posture is more kyphotic if he is in pain.  This occurs after he sleeps in bed, once he tries to get up he is in pain.  Car trips greater than one hour will cause more pain.  Navigating stairs will cause pain.   PMH: L rotator cuff repair, in 2013, aortic aneurysm ascending aorta, hyperlipidemia treated with statins, physical therapy treatment for spine issues.   SH: , semi-retired he works in  at [employer], one daughter and Greg enjoys walking, swimming, exercising,      Objective     Observation: Greg arrived in the clinic ambulating independently with an abnormal gait pattern, Greg's heel strike is reduced mildly, B and time in midstance is inconsistent, B and abnormal.  His gait pattern is slightly antalgic.  Same has B hallux valgus, R greater than L, with hammer toe formation, R greater than L, with bunions at the first MTP joint B, R greater than L.     Posture  Posterior: R shoulder, scapula and ilium are all higher than  the JULIENNE Garza has a scoliosis with concavity in the lumbar area on his R  Lateral: forward head, increased thoracic kyphosis and lumbar lordosis WNL    Lumbar spine AROM  Flexion: moderate loss with R rib hump  LB: moderate to severe loss B, with ipsilateral tightness, at end range  Extension; moderate to severe loss    DTRs: Patellar and Denver's B trace/equal  Upper motor neuron tests: Babinski and Clonus were normal  Sensation:   Motor: hip flexion B 4+/5, knee extension L 4+/5 fatiguing, R 5/5, ankle dorsiflexion B 5/5, EHL L 3+ to 4/5 fatiguing, R 5/5, knee flexion B 4+ to 5/5, and hip extension L 4-/5 fatiguing and R 4+ to 5/5    Functional Outcome Score: YUMIKO, 13/50=26%    Therapeutic exercise  3 part knee to chest x 1  Sciatic nerve gliding, x 10    Self care: I asked Greg to continue with his previous HEP from his last stint with me in physical therapy.     Assessment & Plan       Assessment  Impairments: abnormal gait, activity intolerance, impaired physical strength, lacks appropriate home exercise program, pain with function and weight-bearing intolerance   Other impairment: poor posture  Functional limitations: carrying objects, lifting, walking, pulling, pushing, uncomfortable because of pain, sitting and stooping   Assessment details: Branden Brady is a 74 y.o. male referred to physical therapy for chronic B lumbar spine pain with B sciatica. He presents with an evolving clinical presentation.  He has comorbidities to include soft/bony tissue changes to the spine secondary to postural habits.  Greg has no known personal factors  that may affect his progress in the plan of care.  Signs and symptoms are consistent with physical therapy diagnosis of chronic B LBP with B sciatica, loss of movement, strength loss of, poor posture, difficulty walking and will need education for self care. .    Prognosis: good    Goals  Plan Goals: STGs to be met in 4 weeks  1. A review of core bracing and related exercises  is performed.  2. Cervical spine AROM/strength exercises are reviewed to improve neck positioning in standing.  3. Impressions for custom made orthotics are taken.    LTGs to be met in 12 weeks  1. Greg is wearing custom made orthotics for all weight bearing activities comfortably.  2. He is able to sit one hour and then do a sit to stand transfer comfortably.  3. Greg is independent with a HEP and self care.  4. YUMIKO deficit is below 16%.     Plan  Therapy options: will be seen for skilled therapy services  Planned modality interventions: TENS and ultrasound  Planned therapy interventions: manual therapy, neuromuscular re-education, orthotic fitting/training, postural training, soft tissue mobilization, spinal/joint mobilization, strengthening, stretching, therapeutic activities, transfer training, home exercise program, gait training, functional ROM exercises, flexibility, body mechanics training, ADL retraining, abdominal trunk stabilization and balance/weight-bearing training  Frequency: 1-2 x per week.  Duration in weeks: 12  Treatment plan discussed with: patient  Plan details: Take impressions for orthotics and continue with review of core bracing and related exercise.         Timed:  Manual Therapy:         mins  92984;  Therapeutic Exercise:    9     mins  26437;     Neuromuscular Stephy:   1     mins  77242;    Therapeutic Activity:          mins  30268;     Gait Training:           mins  44068;     Ultrasound:          mins  25904;    Iontophoresis         mins 08798  Dry Needling        mins 58734/ 20561 (Self-pay)      Untimed:  Electrical Stimulation:         mins  71774 ( );  Traction:       mins  80915;   Low Eval          Mins  71509  Mod Eval     35     Mins  30156  High Eval                            Mins  12087    Timed Treatment:   10   mins   Total Treatment:     45   mins    PT SIGNATURE: Gigi Wick PT     License Number: [place] [id number]    Electronically signed by Gigi Wick PT,  01/10/25, 11:00 AM EST    DATE TREATMENT INITIATED: 1/10/2025    Initial Certification  Certification Period: 4/10/2025  I certify that the therapy services are furnished while this patient is under my care.  The services outlined above are required by this patient, and will be reviewed every 90 days.     PHYSICIAN: Panfilo Roland MD   NPI: [id number]                                         DATE:     Please sign and return via fax to [phone] Thank you, [facility].

## 2025-02-14 ENCOUNTER — TREATMENT (OUTPATIENT)
Dept: PHYSICAL THERAPY | Facility: CLINIC | Age: 75
End: 2025-02-14
Payer: MEDICARE

## 2025-02-14 NOTE — PROGRESS NOTES
Physical Therapy Daily Treatment Note    [facility]  [address] [place] [address]  [phone] (phone)  [phone] (fax)    Patient: Branden Brady   : 1950  Diagnosis/ICD-10 Code:  No primary diagnosis found.  Referring practitioner: Panfilo Roland MD  Today's Date: 2025  Patient seen for 2 sessions    Visit Diagnoses:  No diagnosis found.           Subjective: Greg reports that today he is noticing pain into each leg.  He is noticing     Objective     Therapeutic exercise  Core bracing, 30s x 10  Piriformis stretch, figure 4 position, using stretch out strap, 30s x 2, B    NMR: verbal cues for exercise technique were given to include the use of the stretch out strap and technique with the piriformis stretch and for set up of the spine, breathing technique and areas of contraction for proper core bracing.      TENs:  4 electrodes, 2 on each side of the mid lumbar and L-S region, SD-1 mode x 15 minutes    Self care: I added the core bracing and modified piriformis strretch     Assessment & Plan       Assessment  Assessment details: Verbal cues and explanation for technique with each exercise were given.  Greg needed a thorough explanation of the core muscles and how to contract them.  He observed a possible increase in balance after treatment.      Plan  Plan details: Reassess and treat next visit.                Timed:    Manual Therapy:         mins  35271;  Therapeutic Exercise:    15     mins  04526;     Neuromuscular Stephy:    10    mins  10303;    Therapeutic Activity:          mins  09291;     Gait Training:             mins  20355;     Ultrasound:           mins  10043;    Self Care                         5      mins   36461  Orthotic Fitting (initial)  _____ mins 19706  Orthotic Fitting (follow-up) _____ mins 95941      Untimed:    Electrical Stimulation:   15        mins  74492 (MC );  Traction:           mins  08327;   Dry Needling   (1-2 muscles)                  mins 20560 (Self-pay)  Dry Needling (3-4 muscles)  ____  20561 (Self-pay)  Dry Needling Trial             DRYNDLTRIAL  (No Charge)  Canalith Repositioning _____ mins 37819    Timed Treatment:   30   mins   Total Treatment:     45   mins    Gigi Wick PT  Physical Therapist    [place] License:[place] [id number]

## 2025-02-21 ENCOUNTER — TREATMENT (OUTPATIENT)
Dept: PHYSICAL THERAPY | Facility: CLINIC | Age: 75
End: 2025-02-21
Payer: MEDICARE

## 2025-02-21 DIAGNOSIS — R26.2 DIFFICULTY WALKING: ICD-10-CM

## 2025-02-21 DIAGNOSIS — R29.3 POOR POSTURE: ICD-10-CM

## 2025-02-21 DIAGNOSIS — G89.29 CHRONIC BILATERAL LOW BACK PAIN WITH BILATERAL SCIATICA: Primary | ICD-10-CM

## 2025-02-21 DIAGNOSIS — M54.41 CHRONIC BILATERAL LOW BACK PAIN WITH BILATERAL SCIATICA: Primary | ICD-10-CM

## 2025-02-21 DIAGNOSIS — M54.42 CHRONIC BILATERAL LOW BACK PAIN WITH BILATERAL SCIATICA: Primary | ICD-10-CM

## 2025-02-21 NOTE — PROGRESS NOTES
30-Day / 10-Visit Progress Note       [facility]  [address] [place] [address]  [phone] (phone)  [phone] (fax)    Patient: Branden Brady   : 1950  Diagnosis/ICD-10 Code:  Chronic bilateral low back pain with bilateral sciatica [M54.42, M54.41, G89.29]  Referring practitioner: Panfilo Roland MD  Date of Initial Visit: Type: THERAPY  Noted: 1/10/2025  Today's Date: 2025  Patient seen for 3 sessions      ICD-10-CM ICD-9-CM   1. Chronic bilateral low back pain with bilateral sciatica  M54.42 724.2    M54.41 724.3    G89.29 338.29   2. Difficulty walking  R26.2 719.7   3. Poor posture  R29.3 781.92         Subjective:     Clinical Progress: unchanged  Home Program Compliance: Yes  Treatment has included:  therapeutic exercise, neuro-muscular retraining , electrical stimulation , and patient education with home exercise program     Subjective: Greg reports no major changes since he began in January.  This is only his second visit after the evaluation.  He reports not knowing how much effect the TENS unit had on his low back symptoms last visit.  Discomfort with sit to stand transfer after sitting 15 minutes is more uncomfortable than it had been in the past.  Greg expressed that he would like custom made orthotics to correct the mechanics in weight bearing.     Objective     Observation:  B hallux valgus deformities, B bunions at the first MTP joint of each foot, multiple hammer toes on each foot and in standing he displays pes planus B.      Forefoot flexibility: B 45 degrees of passive supination    Functional Outcome Score: YUMIKO,     Treatment    US: R sidelying, 1.5 W/cm2, 100% duty cycle, 1 mHz x 8 minutes to the B L-S area    Manual therapy: in supine with legs on bolster, lumbar spine decompression, via B leg pull, passive KTC x 10, B, passive SLR x 10, B and sciatic nerve gliding x 10, B    Orthotic fit/train:  impressions taken for  custom made orthotics    Self care: I recommended Greg acquire classic yoga toes for men to begin stretching soft tissue in her forefeet.    Assessment & Plan       Assessment  Impairments: abnormal gait, abnormal or restricted ROM, activity intolerance, impaired physical strength, lacks appropriate home exercise program, pain with function and weight-bearing intolerance   Other impairment: performing sit to stand transfer  Functional limitations: lifting, walking, pulling, pushing, sitting and standing   Assessment details: Branden Brady has been seen for 3 physical therapy sessions for chronic bilateral low back pain with bilateral sciatica .  Treatment has included therapeutic exercise, manual therapy, neuro-muscular retraining , ultrasound, and patient education with home exercise program . Progress to physical therapy goals is fair.  He will benefit from continued skilled physical therapy to address remaining impairments and functional limitations.    Prognosis: good  Prognosis details: Greg has pes planus B.  Getting his feet to not overly pronate will bring all the joints of the kinetic chain into a more neutral position in weight bearing and should improve his tolerance as he adapts to the orthotics.    Goals  Plan Goals: STGs to be met in 4 weeks  1. A review of core bracing and related exercises is performed. (Partially met)  2. Cervical spine AROM/strength exercises are reviewed to improve neck positioning in standing. (Partially met)  3. Impressions for custom made orthotics are taken. (Met)     LTGs to be met in 12 weeks  1. Greg is wearing custom made orthotics for all weight bearing activities comfortably. (Ongoing)  2. He is able to sit one hour and then do a sit to stand transfer comfortably. (Ongoing)  3. Greg is independent with a HEP and self care.(Ongoing)  4. YUMIKO deficit is below 16%. (Ongoing)         Plan  Therapy options: will be seen for skilled therapy services  Planned therapy  interventions: orthotic fitting/training, manual therapy, neuromuscular re-education, postural training, strengthening, therapeutic activities, stretching, transfer training, gait training, functional ROM exercises, flexibility, body mechanics training, balance/weight-bearing training, ADL retraining, abdominal trunk stabilization and home exercise program  Frequency: 1-2 x per week.  Duration in weeks: 4  Treatment plan discussed with: patient  Plan details: Continue with current treatment           Recommendations: Continue as planned  Timeframe: 1 month  Prognosis to achieve goals: good    PT Signature: VARGHESE Banda License Number: [id number]    Electronically signed by Gigi Wick PT, 02/21/25, 4:13 PM EST      Based upon review of the patient's progress and continued therapy plan, it is my medical opinion that Branden Brady should continue physical therapy treatment at [facility]  [address] [place] [address] [address] [place] [address]  [phone].    Signature: __________________________________  Panfilo Roland MD    Timed:  Manual Therapy:    15     mins  27469;  Therapeutic Exercise:         mins  64992;     Neuromuscular Stephy:        mins  63128;    Therapeutic Activity:          mins  16378;     Gait Training:           mins  24906;     Ultrasound:    8       mins  82192;    Self-Care:                 __2___  mins  42814;   Orthotic Fitting (initial)  _20____ mins 03425  Orthotic Fitting (follow-up) _____ mins 87073      Untimed:  Electrical Stimulation:           mins  49641 ( );  Traction:           mins  56925;   Dry Needling  (1-2 muscles)         ____  mins 61523 (Self-pay)  Dry Needling (3-4 muscles) ____ mins 53776 (Self-pay)  Dry Needling Trial  ____  mins DRYNDLTRIAL  (No Charge)  Canalith Repositioning  ____ mins 42065        Timed Treatment:   45   mins   Total Treatment:     45   mins

## 2025-02-27 ENCOUNTER — TREATMENT (OUTPATIENT)
Dept: PHYSICAL THERAPY | Facility: CLINIC | Age: 75
End: 2025-02-27
Payer: MEDICARE

## 2025-02-27 DIAGNOSIS — R26.2 DIFFICULTY WALKING: ICD-10-CM

## 2025-02-27 DIAGNOSIS — M54.42 CHRONIC BILATERAL LOW BACK PAIN WITH BILATERAL SCIATICA: Primary | ICD-10-CM

## 2025-02-27 DIAGNOSIS — M54.41 CHRONIC BILATERAL LOW BACK PAIN WITH BILATERAL SCIATICA: Primary | ICD-10-CM

## 2025-02-27 DIAGNOSIS — R29.3 POOR POSTURE: ICD-10-CM

## 2025-02-27 DIAGNOSIS — G89.29 CHRONIC BILATERAL LOW BACK PAIN WITH BILATERAL SCIATICA: Primary | ICD-10-CM

## 2025-02-27 NOTE — PROGRESS NOTES
Physical Therapy Daily Treatment Note    [facility]  [address] [place] [address]  [phone] (phone)  [phone] (fax)    Patient: Branden Brady   : 1950  Diagnosis/ICD-10 Code:  Chronic bilateral low back pain with bilateral sciatica [M54.42, M54.41, G89.29]  Referring practitioner: Panfilo Roland MD  Today's Date: 2025  Patient seen for 4 sessions    Visit Diagnoses:    ICD-10-CM ICD-9-CM   1. Chronic bilateral low back pain with bilateral sciatica  M54.42 724.2    M54.41 724.3    G89.29 338.29   2. Difficulty walking  R26.2 719.7   3. Poor posture  R29.3 781.92              Subjective: Greg notices some pain with sit to stand, initial steps after sitting are painful in his low back and he is observing more kyphotic gait     Objective     Treatment    US, in R side lying, 1.5 W/cm2, 100% duty cycle, 1 mHz x 10 minutes    Therapeutic exercise  Core bracing, hook lying, x 30s  Hip adductor ball squeeze, 10s x 10  Modified bridge, 10s x 10    NMR: verbal cues for exercise technique were given.    Manual therapy: in supine with legs on bolster, lumbar spine decompression, via B leg pull, passive KTC x 10, B, passive SLR x 10, B and sciatic nerve gliding x 10, B     Self care: I added the hip adductor ball squeeze and re-added the modified bridge to his HEP    Assessment & Plan       Assessment  Assessment details: Greg progressed his HEP with the ball squeeze and modified bridge.  Greg's posterior chain is very tight and with a passive SLR he is able to tolerate 45 degrees of passive movement before tightening.  Greg tolerated treatment well.     Plan  Plan details: Continue per treatment plan.                Timed:    Manual Therapy:    10     mins  09681;  Therapeutic Exercise:    25     mins  30668;     Neuromuscular Stephy:    8    mins  94258;    Therapeutic Activity:          mins  84654;     Gait Training:             mins  97953;      Ultrasound:           mins  55904;    Self Care                               mins   70283  Orthotic Fitting (initial)  _____ mins 73536  Orthotic Fitting (follow-up) _____ mins 38905      Untimed:    Electrical Stimulation:           mins  46311 ( );  Traction:           mins  15350;   Dry Needling  (1-2 muscles)                  mins 59499 (Self-pay)  Dry Needling (3-4 muscles)  ____  20561 (Self-pay)  Dry Needling Trial             DRYNDLTRIAL  (No Charge)  Canalith Repositioning _____ mins 02003    Timed Treatment:   43   mins   Total Treatment:     43   mins    Gigi Wick PT  Physical Therapist    [place] License[place] PT [id number]

## 2025-03-10 ENCOUNTER — TREATMENT (OUTPATIENT)
Dept: PHYSICAL THERAPY | Facility: CLINIC | Age: 75
End: 2025-03-10
Payer: MEDICARE

## 2025-03-10 DIAGNOSIS — G89.29 CHRONIC BILATERAL LOW BACK PAIN WITH BILATERAL SCIATICA: Primary | ICD-10-CM

## 2025-03-10 DIAGNOSIS — R26.2 DIFFICULTY WALKING: ICD-10-CM

## 2025-03-10 DIAGNOSIS — R29.3 POOR POSTURE: ICD-10-CM

## 2025-03-10 DIAGNOSIS — M54.42 CHRONIC BILATERAL LOW BACK PAIN WITH BILATERAL SCIATICA: Primary | ICD-10-CM

## 2025-03-10 DIAGNOSIS — M54.41 CHRONIC BILATERAL LOW BACK PAIN WITH BILATERAL SCIATICA: Primary | ICD-10-CM

## 2025-03-10 NOTE — PROGRESS NOTES
Physical Therapy Daily Treatment Note    [facility]  [address] [place] [address]  [phone] (phone)  [phone] (fax)    Patient: Branden Brady   : 1950  Diagnosis/ICD-10 Code:  Chronic bilateral low back pain with bilateral sciatica [M54.42, M54.41, G89.29]  Referring practitioner: Panfilo Roland MD  Today's Date: 3/10/2025  Patient seen for 5 sessions    Visit Diagnoses:    ICD-10-CM ICD-9-CM   1. Chronic bilateral low back pain with bilateral sciatica  M54.42 724.2    M54.41 724.3    G89.29 338.29   2. Difficulty walking  R26.2 719.7   3. Poor posture  R29.3 781.92              Subjective: Greg reports having discomfort in the R gluteal area.     Objective     Treatment     US, in R side lying, R L-S area, 1.5 W/cm2, 100% duty cycle, 1 mHz x 8 minutes     Therapeutic exercise  Core bracing, hook lying, x 30s  Hip adductor ball squeeze, 10s x 10  Modified bridge, 10s x 10     Manual therapy: in supine with legs on bolster, lumbar spine decompression, via B leg pull, passive KTC x 10, B, passive SLR x 10, B and sciatic nerve gliding x 10, B      Assessment & Plan       Assessment  Assessment details: Greg reported noticing some discomfort in the gluteal area doing the core bracing iniitially today.  With cues to focus on the core musculature and decrease the intensity of the contraction that went away.  The rest of the treatment went well today.    Plan  Plan details: To try and get him into the adaptation process with the orthotics when they arrive and continue as indicated.                Timed:    Manual Therapy:    14     mins  11248;  Therapeutic Exercise:    20     mins  03818;     Neuromuscular Stephy:    1    mins  56769;    Therapeutic Activity:          mins  25594;     Gait Training:             mins  72674;     Ultrasound:      8     mins  63920;    Self Care                               mins   12983  Orthotic Fitting (initial)  _____  mins 44312  Orthotic Fitting (follow-up) _____ mins 17455      Untimed:    Electrical Stimulation:           mins  71907 ( );  Traction:           mins  97823;   Dry Needling  (1-2 muscles)                  mins 20560 (Self-pay)  Dry Needling (3-4 muscles)  ____  20561 (Self-pay)  Dry Needling Trial             DRYNDLTRIAL  (No Charge)  Canalith Repositioning _____ mins 62548    Timed Treatment:   43   mins   Total Treatment:     43   mins    Gigi Wick PT  Physical Therapist    [place] License[place] PT [id number]

## 2025-03-14 ENCOUNTER — TREATMENT (OUTPATIENT)
Dept: PHYSICAL THERAPY | Facility: CLINIC | Age: 75
End: 2025-03-14
Payer: MEDICARE

## 2025-03-14 DIAGNOSIS — R26.2 DIFFICULTY WALKING: ICD-10-CM

## 2025-03-14 DIAGNOSIS — R29.3 POOR POSTURE: ICD-10-CM

## 2025-03-14 DIAGNOSIS — G89.29 CHRONIC BILATERAL LOW BACK PAIN WITH BILATERAL SCIATICA: Primary | ICD-10-CM

## 2025-03-14 DIAGNOSIS — M54.42 CHRONIC BILATERAL LOW BACK PAIN WITH BILATERAL SCIATICA: Primary | ICD-10-CM

## 2025-03-14 DIAGNOSIS — M54.41 CHRONIC BILATERAL LOW BACK PAIN WITH BILATERAL SCIATICA: Primary | ICD-10-CM

## 2025-03-14 NOTE — PROGRESS NOTES
Physical Therapy Daily Treatment Note    [facility]  [address] [place] [address]  [phone] (phone)  [phone] (fax)    Patient: Branden Brady   : 1950  Diagnosis/ICD-10 Code:  Chronic bilateral low back pain with bilateral sciatica [M54.42, M54.41, G89.29]  Referring practitioner: Panfilo Roland MD  Today's Date: 3/14/2025  Patient seen for 6 sessions    Visit Diagnoses:    ICD-10-CM ICD-9-CM   1. Chronic bilateral low back pain with bilateral sciatica  M54.42 724.2    M54.41 724.3    G89.29 338.29   2. Difficulty walking  R26.2 719.7   3. Poor posture  R29.3 781.92              Subjective: Greg reported that the orthotics made his feet feel different while walking, but not necessarily bad.     Objective     Treatment    Orthotic check out:  In sitting, each orthotic was placed under his feet.  In that position, he fully plantarflexed each ankle I placed the orthotic up into his arch and a full contact fit was met.  I then trimmed the orthotics to fit his shoes.    Therapeutic exercise  1/12 of a mile ambulating with the orthotics in his shoes  Runner's stretch, 60s x 1, B, with the orthotics in his shoes  1/12 of a mile ambulating with the orthotics in his shoes.     NMR verbal cues for the runner's stretch technique    Self care: I added the runner's stretch to his HEP.  Greg was given instructions for adapting to the orthotics slowly, instructions for care of his feet and the orthotics.     Assessment & Plan       Assessment  Assessment details: A total contact fit with the orthotics was attained with the assessment today.  Greg's initial response to the orthotics during ambulation was good.  It will take time for Greg's kinetic chain to adapt to the control of excessive pronation of the forefeet.     Plan  Plan details: To see in a couple weeks, reassess and continue with current treatment.                Timed:    Manual Therapy:         mins   07559;  Therapeutic Exercise:    10     mins  98968;     Neuromuscular Stephy:    3    mins  03442;    Therapeutic Activity:          mins  43332;     Gait Training:             mins  69213;     Ultrasound:           mins  92546;    Self Care                               mins   85766  Orthotic Fitting (initial)  _____ mins 67634  Orthotic Fitting (follow-up) _25____ mins 71402      Untimed:    Electrical Stimulation:           mins  75282 ( );  Traction:           mins  73848;   Dry Needling  (1-2 muscles)                  mins 20560 (Self-pay)  Dry Needling (3-4 muscles)  ____  20561 (Self-pay)  Dry Needling Trial             DRYNDLTRIAL  (No Charge)  Canalith Repositioning _____ mins 25260    Timed Treatment:   38   mins   Total Treatment:     38   mins    Gigi Wick PT  Physical Therapist    [place] License[place] [id number]

## 2025-04-01 ENCOUNTER — TREATMENT (OUTPATIENT)
Dept: PHYSICAL THERAPY | Facility: CLINIC | Age: 75
End: 2025-04-01
Payer: MEDICARE

## 2025-04-01 DIAGNOSIS — R26.2 DIFFICULTY WALKING: ICD-10-CM

## 2025-04-01 DIAGNOSIS — G89.29 CHRONIC BILATERAL LOW BACK PAIN WITH BILATERAL SCIATICA: Primary | ICD-10-CM

## 2025-04-01 DIAGNOSIS — M54.42 CHRONIC BILATERAL LOW BACK PAIN WITH BILATERAL SCIATICA: Primary | ICD-10-CM

## 2025-04-01 DIAGNOSIS — R29.3 POOR POSTURE: ICD-10-CM

## 2025-04-01 DIAGNOSIS — M54.41 CHRONIC BILATERAL LOW BACK PAIN WITH BILATERAL SCIATICA: Primary | ICD-10-CM

## 2025-04-01 NOTE — PROGRESS NOTES
30-Day / 10-Visit Progress Note       [facility]  [address] [place] [address]  [phone] (phone)  [phone] (fax)    Patient: Branden Brady   : 1950  Diagnosis/ICD-10 Code:  Chronic bilateral low back pain with bilateral sciatica [M54.42, M54.41, G89.29]  Referring practitioner: Panfilo Roland MD  Date of Initial Visit: Type: THERAPY  Noted: 1/10/2025  Today's Date: 2025  Patient seen for 7 sessions      ICD-10-CM ICD-9-CM   1. Chronic bilateral low back pain with bilateral sciatica  M54.42 724.2    M54.41 724.3    G89.29 338.29   2. Difficulty walking  R26.2 719.7   3. Poor posture  R29.3 781.92         Subjective:     Clinical Progress: unchanged  Home Program Compliance: Yes  Treatment has included:  therapeutic exercise, manual therapy, neuro-muscular retraining , patient education with home exercise program , and orthotic fabrication     Subjective: Greg continues to notice stiffness in his low back after 15-20 minutes.  Performing a sit to stand transfer after sitting for 20 minutes, is difficult and the initial steps of ambulation after being seated for 20 minutes are difficult.  Greg stated that the orthotics are going well and he feels his gait is improved since using them.     Objective    Reassessment    Gait: 1/12 of a mile around the track.  I observed a decrease in heel strike, L greater than R, during the gait process.      Motor control: hip flexion B 5/5, knee extension B 5/5, ankle dorsiflexion L 4+/5 fatiguing, R 5/5, EHL L 3+/5 fatiguing, R 5/5, knee flexion L 4+/5 fatiguing, R 5/5 hip extension L 4/5 fatiguing R 5/5    Sensory: L1-S2 dermatomes were intact, B     Functional Outcome Score: YUMIKO: 15/50=30% deficit    Manual therapy: in supine, legs on bolster, lumbar spine decompression via B leg pull, passive SKTC x 10, B, passive SLRs x 10, B and sciatic nerve gliding x 10, B    Therapeutic exercise  3 part KTC, x  5    Assessment & Plan       Assessment  Impairments: abnormal gait, activity intolerance, impaired physical strength, lacks appropriate home exercise program, pain with function and weight-bearing intolerance   Functional limitations: carrying objects, lifting, walking, pulling, pushing, uncomfortable because of pain, sitting, standing and stooping   Assessment details: Branden Brady has been seen for chronic bilateral low back pain with bilateral sciatica physical therapy sessions for 7.  Treatment has included therapeutic exercise, manual therapy, neuro-muscular retraining , ultrasound, patient education with home exercise program , and orthotic fabrication . Progress to physical therapy goals is fair.  He will benefit from continued skilled physical therapy to address remaining impairments and functional limitations.      Goals  Plan Goals: STGs to be met in 4 weeks  1. A review of core bracing and related exercises is performed. (Partially met)  2. Cervical spine AROM/strength exercises are reviewed to improve neck positioning in standing. (Met)  3. Impressions for custom made orthotics are taken. (Met)     LTGs to be met in 12 weeks  1. Greg is wearing custom made orthotics for all weight bearing activities comfortably. (met)  2. He is able to sit one hour and then do a sit to stand transfer comfortably. (Ongoing)  3. Greg is independent with a HEP and self care.(Ongoing)  4. YUMIKO deficit is below 16%. (Ongoing)      Plan  Therapy options: will be seen for skilled therapy services  Planned modality interventions: ultrasound, dry needling and TENS  Planned therapy interventions: manual therapy, neuromuscular re-education, orthotic fitting/training, strengthening, stretching, therapeutic activities, transfer training, home exercise program, gait training, functional ROM exercises, flexibility, balance/weight-bearing training, body mechanics training, ADL retraining and abdominal trunk stabilization  Frequency:  1x week  Duration in weeks: 4  Treatment plan discussed with: patient  Plan details: Continue per treatment plan.           Recommendations: Continue as planned  Timeframe: 1 month  Prognosis to achieve goals: good    PT Signature: Gigi Wick PT    [place] License Number: [id number]    Electronically signed by Gigi Wick PT, 04/01/25, 10:49 AM EDT      Based upon review of the patient's progress and continued therapy plan, it is my medical opinion that Branden Brady should continue physical therapy treatment at [facility]  [address] [place] [address] [address] [place] [address]  [phone].    Signature: __________________________________  Panfilo Roland MD    Timed:  Manual Therapy:    10     mins  24392;  Therapeutic Exercise:    35     mins  28512;     Neuromuscular Stephy:        mins  37018;    Therapeutic Activity:          mins  16299;     Gait Training:           mins  01116;     Ultrasound:           mins  08749;    Self-Care:                 _____  mins  44802;   Orthotic Fitting (initial)  _____ mins 29044  Orthotic Fitting (follow-up) _____ mins 10573      Untimed:  Electrical Stimulation:           mins  60850 ( );  Traction:           mins  64446;   Dry Needling  (1-2 muscles)         ____  mins 43710 (Self-pay)  Dry Needling (3-4 muscles) ____ mins 74114 (Self-pay)  Dry Needling Trial  ____  mins DRYNDLTRIAL  (No Charge)  Canalith Repositioning  ____ mins 81477        Timed Treatment:   45   mins   Total Treatment:     45   mins

## 2025-04-07 ENCOUNTER — TREATMENT (OUTPATIENT)
Dept: PHYSICAL THERAPY | Facility: CLINIC | Age: 75
End: 2025-04-07
Payer: MEDICARE

## 2025-04-07 DIAGNOSIS — M54.42 CHRONIC BILATERAL LOW BACK PAIN WITH BILATERAL SCIATICA: Primary | ICD-10-CM

## 2025-04-07 DIAGNOSIS — R29.3 POOR POSTURE: ICD-10-CM

## 2025-04-07 DIAGNOSIS — G89.29 CHRONIC BILATERAL LOW BACK PAIN WITH BILATERAL SCIATICA: Primary | ICD-10-CM

## 2025-04-07 DIAGNOSIS — R26.2 DIFFICULTY WALKING: ICD-10-CM

## 2025-04-07 DIAGNOSIS — M54.41 CHRONIC BILATERAL LOW BACK PAIN WITH BILATERAL SCIATICA: Primary | ICD-10-CM

## 2025-04-07 PROCEDURE — 97110 THERAPEUTIC EXERCISES: CPT | Performed by: PHYSICAL THERAPIST

## 2025-04-07 PROCEDURE — 97140 MANUAL THERAPY 1/> REGIONS: CPT | Performed by: PHYSICAL THERAPIST

## 2025-04-07 NOTE — PROGRESS NOTES
"Physical Therapy Daily Treatment Note    [facility]  [address] [place] [address]  [phone] (phone)  [phone] (fax)    Patient: Branden Brady   : 1950  Diagnosis/ICD-10 Code:  Chronic bilateral low back pain with bilateral sciatica [M54.42, M54.41, G89.29]  Referring practitioner: Panfilo Roland MD  Today's Date: 2025  Patient seen for 8 sessions    Visit Diagnoses:    ICD-10-CM ICD-9-CM   1. Chronic bilateral low back pain with bilateral sciatica  M54.42 724.2    M54.41 724.3    G89.29 338.29   2. Difficulty walking  R26.2 719.7   3. Poor posture  R29.3 781.92              Subjective: Greg reported that he goes to a gym and does circuit type strengthening, 10 repetitions, 2 sets of each exercise and finishes with 30 minutes of work on the treadmill.  All this is done without provoking pain. :     Objective     Treatment    Therapeutic exercise  Core bracing, hook lying, 30s x 3  Hip adduction ball squeeze, with core bracing, 10s x 10  Modified bridge, 30s x 3  SLR, approximately 14\", with core bracing, x 10, B    NMR: verbal cues for exercise technique were given.    Manual therapy: in supine with legs on bolster, lumbar spine decompression, via B leg pull, passive KTC x 10, B, passive SLR x 10, B and sciatic nerve gliding x 10, B     Self care: I added the SLR to his HEP.     Assessment & Plan       Assessment  Assessment details: Greg progressed his lumbar stabilization exercise program with the SLR.  He required verbal cues for exercise technique and tolerated treatment well.      Plan  Plan details: Continue per treatment plan.                Timed:    Manual Therapy:    10     mins  12995;  Therapeutic Exercise:    30     mins  96086;     Neuromuscular Stephy:    4    mins  67414;    Therapeutic Activity:          mins  09360;     Gait Training:             mins  66294;     Ultrasound:           mins  86078;    Self Care                    "     1       mins   78348  Orthotic Fitting (initial)  _____ mins 77325  Orthotic Fitting (follow-up) _____ mins 57375      Untimed:    Electrical Stimulation:           mins  00727 ( );  Traction:           mins  22883;   Dry Needling  (1-2 muscles)                  mins 35568 (Self-pay)  Dry Needling (3-4 muscles)  ____  20561 (Self-pay)  Dry Needling Trial             DRYNDLTRIAL  (No Charge)  Canalith Repositioning _____ mins 21208    Timed Treatment:   45   mins   Total Treatment:     45   mins    Gigi Wick PT  Physical Therapist    [place] License[place] PT [id number]

## 2025-04-21 ENCOUNTER — TREATMENT (OUTPATIENT)
Dept: PHYSICAL THERAPY | Facility: CLINIC | Age: 75
End: 2025-04-21
Payer: MEDICARE

## 2025-04-21 DIAGNOSIS — R29.3 POOR POSTURE: ICD-10-CM

## 2025-04-21 DIAGNOSIS — R26.2 DIFFICULTY WALKING: ICD-10-CM

## 2025-04-21 DIAGNOSIS — M54.41 CHRONIC BILATERAL LOW BACK PAIN WITH BILATERAL SCIATICA: Primary | ICD-10-CM

## 2025-04-21 DIAGNOSIS — M54.42 CHRONIC BILATERAL LOW BACK PAIN WITH BILATERAL SCIATICA: Primary | ICD-10-CM

## 2025-04-21 DIAGNOSIS — G89.29 CHRONIC BILATERAL LOW BACK PAIN WITH BILATERAL SCIATICA: Primary | ICD-10-CM

## 2025-04-21 PROCEDURE — 97110 THERAPEUTIC EXERCISES: CPT | Performed by: PHYSICAL THERAPIST

## 2025-04-21 PROCEDURE — 97535 SELF CARE MNGMENT TRAINING: CPT | Performed by: PHYSICAL THERAPIST

## 2025-04-21 PROCEDURE — 97140 MANUAL THERAPY 1/> REGIONS: CPT | Performed by: PHYSICAL THERAPIST

## 2025-04-21 NOTE — PROGRESS NOTES
Physical Therapy Daily Treatment Note    [facility]  [address] [place] [address]  [phone] (phone)  [phone] (fax)    Patient: Branden Brady   : 1950  Diagnosis/ICD-10 Code:  Chronic bilateral low back pain with bilateral sciatica [M54.42, M54.41, G89.29]  Referring practitioner: Panfilo Roland MD  Today's Date: 2025  Patient seen for 9 sessions    Visit Diagnoses:    ICD-10-CM ICD-9-CM   1. Chronic bilateral low back pain with bilateral sciatica  M54.42 724.2    M54.41 724.3    G89.29 338.29   2. Difficulty walking  R26.2 719.7   3. Poor posture  R29.3 781.92              Subjective: Greg stated that he recently has been experiencing pain in the R LE, along the inferior gluteal area, going laterally into the L thigh that will go over into the R lower leg.     Objective     Observation:  I watched him walk with his old [employer] shoes, orthotic in, and his gait at the calcaneus and the Vaiden's tendon, B looked normal.     Treatment    Manual therapy:  In supine, legs on bolster, lumbar spine deompression, via B leg pull, 60s x 5, followed by passive KTC, x 10, B, passive SLR x 10, B and sciatic nerve gliding x 10, B    Therapeutic exercise  Modified piriformis stretch, 60s x 2, B  Hip clam, hook lying, green theraband, x 20    NMR: verbal cues for exercise technique were given.    Self care:  I spoke to Greg about pain management type treatments, especially epidural injections, in case things do not improve.  The type of piriformis stretch he was dong was with the opposite leg doing a knee to chest moment and I replaced it with the modified piriformis stretch for home and added the hip clam, with green theraband, also.     Assessment & Plan       Assessment  Assessment details: Greg has a chronic lumbar spine issue that is acutely having R sciatica.  He noticed relief with today's treatment and the new exercises, minus the other piriformis  stretch, may calm that down.  It may behoove him to contact Dr. Roland's office for a possible referal to pain management.     Plan  Plan details: Continue per treatment plan.                Timed:    Manual Therapy:    10     mins  68462;  Therapeutic Exercise:    15     mins  96522;     Neuromuscular Stephy:    5    mins  52358;    Therapeutic Activity:          mins  64481;     Gait Training:             mins  47796;     Ultrasound:           mins  94933;    Self Care                       10        mins   17307  Orthotic Fitting (initial)  _____ mins 41134  Orthotic Fitting (follow-up) _____ mins 23569      Untimed:    Electrical Stimulation:           mins  95405 ( );  Traction:           mins  11075;   Dry Needling  (1-2 muscles)                  mins 58386 (Self-pay)  Dry Needling (3-4 muscles)  ____  20561 (Self-pay)  Dry Needling Trial             DRYNDLTRIAL  (No Charge)  Canalith Repositioning _____ mins 96295    Timed Treatment:   40   mins   Total Treatment:     40   mins    Gigi Wick PT  Physical Therapist    [place] License[place] PT [id number]

## 2025-04-28 ENCOUNTER — TREATMENT (OUTPATIENT)
Dept: PHYSICAL THERAPY | Facility: CLINIC | Age: 75
End: 2025-04-28
Payer: MEDICARE

## 2025-04-28 DIAGNOSIS — R26.2 DIFFICULTY WALKING: ICD-10-CM

## 2025-04-28 DIAGNOSIS — M54.42 CHRONIC BILATERAL LOW BACK PAIN WITH BILATERAL SCIATICA: Primary | ICD-10-CM

## 2025-04-28 DIAGNOSIS — G89.29 CHRONIC BILATERAL LOW BACK PAIN WITH BILATERAL SCIATICA: Primary | ICD-10-CM

## 2025-04-28 DIAGNOSIS — M54.41 CHRONIC BILATERAL LOW BACK PAIN WITH BILATERAL SCIATICA: Primary | ICD-10-CM

## 2025-04-28 DIAGNOSIS — R29.3 POOR POSTURE: ICD-10-CM

## 2025-04-28 PROCEDURE — 97110 THERAPEUTIC EXERCISES: CPT | Performed by: PHYSICAL THERAPIST

## 2025-04-28 PROCEDURE — 97763 ORTHC/PROSTC MGMT SBSQ ENC: CPT | Performed by: PHYSICAL THERAPIST

## 2025-04-28 NOTE — PROGRESS NOTES
"Physical Therapy Daily Treatment Note    [facility]  [address] [place] [address]  [phone] (phone)  [phone] (fax)    Patient: Branden Brady   : 1950  Diagnosis/ICD-10 Code:  No primary diagnosis found.  Referring practitioner: Panfilo Roland MD  Today's Date: 2025  Patient seen for Visit count could not be calculated. Make sure you are using a visit which is associated with an episode. sessions    Visit Diagnoses:  No diagnosis found.           Subjective: Greg arrived today to have his new orthotics assessed for fit and trimmed to his new [employer] walking shoes.      Objective     Treatment    Orthotic check out:  in sitting, each of the new orthotics were placed under Greg's feet.  Greg fully plantarflexed each ankle and when I placed the orthotics into his arch in that position a full contact fit was observed.  I then trimmed the orthotics to fit his [employer] shoes.      Therapeutic exercise:  Greg walked 1/12 of a mile with the new shoes and orthotics on.      Observation:  I observed him walk with the shoes on and his Abie's tendon, B, were positioned appropriately during the gait cycle, B.  I also noticed that in standing, from a posterior view, that Greg's L iliac crest and shoulder were each a little lower than his R.  I placed 1/2\" of paper in standing, under his L foot and both the iliac crest and L shoulder seemed to align evenly with the R side.      Assessment & Plan       Assessment  Assessment details: The orthotics fit well.  Greg's pelvis is unlevel during weight bearing.  This may have an effect on his lumbar spine related pain.      Plan  Plan details: To see in one week and continue treatment.                Timed:    Manual Therapy:         mins  34954;  Therapeutic Exercise:    8     mins  71532;     Neuromuscular Stephy:        mins  48437;    Therapeutic Activity:          mins  28007;     Gait Training: "             mins  78417;     Ultrasound:           mins  27798;    Self Care                        5       mins   62194  Orthotic Fitting (initial)  _____ mins 27671  Orthotic Fitting (follow-up) _25____ mins 67793      Untimed:    Electrical Stimulation:           mins  44634 ( );  Traction:           mins  60378;   Dry Needling  (1-2 muscles)                  mins 05671 (Self-pay)  Dry Needling (3-4 muscles)  ____  20561 (Self-pay)  Dry Needling Trial             DRYNDLTRIAL  (No Charge)  Canalith Repositioning _____ mins 59597    Timed Treatment:   38   mins   Total Treatment:     38   mins    Gigi Wick PT  Physical Therapist    [place] [id number]

## 2025-05-06 ENCOUNTER — TREATMENT (OUTPATIENT)
Dept: PHYSICAL THERAPY | Facility: CLINIC | Age: 75
End: 2025-05-06
Payer: MEDICARE

## 2025-05-06 DIAGNOSIS — M54.41 CHRONIC BILATERAL LOW BACK PAIN WITH BILATERAL SCIATICA: Primary | ICD-10-CM

## 2025-05-06 DIAGNOSIS — R26.2 DIFFICULTY WALKING: ICD-10-CM

## 2025-05-06 DIAGNOSIS — M54.42 CHRONIC BILATERAL LOW BACK PAIN WITH BILATERAL SCIATICA: Primary | ICD-10-CM

## 2025-05-06 DIAGNOSIS — R29.3 POOR POSTURE: ICD-10-CM

## 2025-05-06 DIAGNOSIS — G89.29 CHRONIC BILATERAL LOW BACK PAIN WITH BILATERAL SCIATICA: Primary | ICD-10-CM

## 2025-05-06 NOTE — PROGRESS NOTES
Physical Therapy Daily Treatment Note    [facility]  [address] [place] [address]  [phone] (phone)  [phone] (fax)    Patient: Branden Brady   : 1950  Diagnosis/ICD-10 Code:  Chronic bilateral low back pain with bilateral sciatica [M54.42, M54.41, G89.29]  Referring practitioner: Panfilo Roland MD  Today's Date: 2025  Patient seen for 11 sessions    Visit Diagnoses:    ICD-10-CM ICD-9-CM   1. Chronic bilateral low back pain with bilateral sciatica  M54.42 724.2    M54.41 724.3    G89.29 338.29   2. Difficulty walking  R26.2 719.7   3. 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  He complains of some pain in the L side of his neck.     Objective     Treatment    Therapeutic exercise  Core bracing, 60s x 2  Hip adduction ball squeeze  Hip clam, hook lying, green theraband x 20  Modified bridge, 10s x 10  CV, craniovertebral, flexion x 20  Neck rotation, with CV flexion, x 10, B  *all exercises were performed in hook lying, with pillow and towel roll under his neck    NMR:  verbal cues for each exercise were provided.  For the cervical spine exercises the concept of getting the head over the torso, in lying, then realigning, the upper neck by doing the CV flexion, for better alignment with rotation were given.     Self care: I added the CV flexion and neck rotation to his HEP.  We spoke of applying the positioning of the neck to upright positions, like driving, to improve neck rotation and avoid aggravating the neck.    Assessment & Plan       Assessment  Assessment details: The neck movements should improve neck comfort and awareness of positioning when upright.  He required verbal cues for exercise technique and tolerated treatment well.     Plan  Plan details: Continue  per treatment plan.          Timed:    Manual Therapy:         mins  05181;  Therapeutic Exercise:    30     mins  61184;     Neuromuscular Stephy:    8    mins  81770;    Therapeutic Activity:          mins  50100;     Gait Training:             mins  92479;     Ultrasound:           mins  08777;    Self Care                        2       mins   46379  Orthotic Fitting (initial)  _____ mins 94420  Orthotic Fitting (follow-up) _____ mins 76687      Untimed:    Electrical Stimulation:           mins  16630 ( );  Traction:           mins  75971;   Dry Needling  (1-2 muscles)                  mins 20560 (Self-pay)  Dry Needling (3-4 muscles)  ____  20561 (Self-pay)  Dry Needling Trial             DRYNDLTRIAL  (No Charge)  Canalith Repositioning _____ mins 34596    Timed Treatment:   40   mins   Total Treatment:     40   mins    Gigi Wick PT  Physical Therapist    [place] [id number]

## 2025-05-13 ENCOUNTER — TREATMENT (OUTPATIENT)
Dept: PHYSICAL THERAPY | Facility: CLINIC | Age: 75
End: 2025-05-13
Payer: MEDICARE

## 2025-05-13 DIAGNOSIS — G89.29 CHRONIC NECK PAIN: ICD-10-CM

## 2025-05-13 DIAGNOSIS — R26.2 DIFFICULTY WALKING: ICD-10-CM

## 2025-05-13 DIAGNOSIS — M54.2 CHRONIC NECK PAIN: ICD-10-CM

## 2025-05-13 DIAGNOSIS — R29.3 POOR POSTURE: ICD-10-CM

## 2025-05-13 DIAGNOSIS — M54.42 CHRONIC BILATERAL LOW BACK PAIN WITH BILATERAL SCIATICA: Primary | ICD-10-CM

## 2025-05-13 DIAGNOSIS — G89.29 CHRONIC BILATERAL LOW BACK PAIN WITH BILATERAL SCIATICA: Primary | ICD-10-CM

## 2025-05-13 DIAGNOSIS — M54.41 CHRONIC BILATERAL LOW BACK PAIN WITH BILATERAL SCIATICA: Primary | ICD-10-CM

## 2025-05-13 NOTE — PROGRESS NOTES
Physical Therapy Re-certification     [facility]  [address] [place] [address]  [phone] (phone)  [phone] (fax)      Patient: Branden Brady   : 1950  Diagnosis/ICD-10 Code:  Chronic bilateral low back pain with bilateral sciatica [M54.42, M54.41, G89.29]  Referring practitioner: Panfilo Roland MD  Date of Initial Visit: Type: THERAPY  Noted: 1/10/2025  Today's Date: 2025  Patient seen for 12 sessions    Visit Diagnoses:    ICD-10-CM ICD-9-CM   1. Chronic bilateral low back pain with bilateral sciatica  M54.42 724.2    M54.41 724.3    G89.29 338.29   2. Difficulty walking  R26.2 719.7   3. 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  This has been a pleasant surprise to him.  He has intermittent pain in his low back and legs,  The leg discomfort more frequently and sitting trigger this.  Peak pain still can reach 7/10 in his R leg specifically.  Greg also has recently been experiencing neck pain staying on his left side. He notices discomfort in the neck when driving and turning to his left.     Objective     Reassessment    Cervical spine decompression test:  In supine, legs on bolster, no pain elicited with the movement    UE neural tension testing: L abnormal, with mild discomfort and R normal    Functional Outcome Score: YUMIKO, 14/50 and NDI 13/50=26% deficit    Manual therapy:  in supine, legs on bolster, cervical spine decompression/STM intermittently followed by L UE nerve gliding.    Therapeutic exercise  CV flexion x 20  Neck rotation, with CV flexion, x 10, B  Core bracing, 30s x 1  Hip  adduction ball squeeze, with core bracing, 10s x 10  5. Hip clam, hook lying, with blue theraband, x 10    NMR: verbal cues for exercise technique were given.     Assessment & Plan       Assessment  Impairments: abnormal or restricted ROM, activity intolerance, impaired physical strength, lacks appropriate home exercise program, pain with function and weight-bearing intolerance   Other impairment: poor posture  Functional limitations: carrying objects, lifting, walking, pulling, pushing, uncomfortable because of pain and standing   Assessment details: Branden Brady has been seen for 12 physical therapy sessions for chronic bilateral low back pain with bilateral sciatica.  Treatment has included therapeutic exercise, manual therapy, neuro-muscular retraining , ultrasound, and patient education with home exercise program . Progress to physical therapy goals is fair to good.  He will benefit from continued skilled physical therapy to address remaining impairments and functional limitations.    Prognosis: good  Prognosis details: Greg responded well to treatment of the neck and would benefit from including that in his treatment.     Goals  Plan Goals: STGs to be met in 4 weeks  1. A review of core bracing and related exercises is performed. (Partially met)  2. Cervical spine AROM/strength exercises are reviewed to improve neck positioning in standing. (Met)  3. Impressions for custom made orthotics are taken. (Met)     LTGs to be met in 12 weeks  1. Greg is wearing custom made orthotics for all weight bearing activities comfortably. (met)  2. He is able to sit one hour and then do a sit to stand transfer comfortably. (Ongoing)  3. Greg is independent with a HEP and self care.(Ongoing)  4. YUMIKO deficit is below 16%. (Ongoing)  5. Greg is able to turn his neck, while driving, and have no pain. (New)      Plan  Therapy options: will be seen for skilled therapy services  Planned modality interventions: ultrasound and dry  needling  Planned therapy interventions: manual therapy, neuromuscular re-education, postural training, strengthening, therapeutic activities, home exercise program, functional ROM exercises, flexibility, body mechanics training, ADL retraining and abdominal trunk stabilization  Frequency: 1x week  Duration in weeks: 4  Treatment plan discussed with: patient  Plan details: Greg will continue with treatment of the spine to include the cervical area.           Recommendations: Continue as planned  Timeframe: 1 month  Prognosis to achieve goals: good    PT Signature: Gigi Wick PT    [place] License Number: [id number]    Electronically signed by Gigi Wick PT, 05/13/25, 1:46 PM EDT      Based upon review of the patient's progress and continued therapy plan, it is my medical opinion that Branden Brady should continue physical therapy treatment at [facility]  [address] [place] [address] DR SULAIMAN GAIVRIA [phone]. office phone  [phone] office fax      Re-certification  Certification Period: 8/11/2025  I certify that the therapy services are furnished while this patient is under my care.  The services outlined above are required by this patient, and will be reviewed every 90 days.     PHYSICIAN: Panfilo Roland MD   NPI: [id number]                                         DATE:     Signature: __________________________________  Panfilo Roland MD      Please sign and return via fax to [phone]   Thank you, [facility].    Timed:  Manual Therapy:    15     mins  43336;  Therapeutic Exercise:    25     mins  04647;     Neuromuscular Stephy:    3    mins  01615;    Therapeutic Activity:          mins  00812;     Gait Training:             mins  22571;     Ultrasound:            mins  97709;    Orthotic (Initial)                      mins 78015;  Orthotic (follow-up)          mins 42646;  Self Care                      2       mins    81287      Untimed:  Electrical Stimulation:            mins  30117 ( );  Traction:           mins  69903;   Dry Needling  (1-2 muscles)       _____  mins 20560 (Self-pay)  Dry Needling (3-4 muscles)            mins 20561 (Self-pay)  Dry Needling Trial           mins DRYNDLTRIAL  (No Charge)  Canalith Repositioning           mins 06867;  Re-eval   _____ min 38681    :  Timed Treatment:   45   mins   Total Treatment:     45   mins

## 2025-05-20 ENCOUNTER — TREATMENT (OUTPATIENT)
Dept: PHYSICAL THERAPY | Facility: CLINIC | Age: 75
End: 2025-05-20
Payer: MEDICARE

## 2025-05-20 DIAGNOSIS — G89.29 CHRONIC BILATERAL LOW BACK PAIN WITH BILATERAL SCIATICA: Primary | ICD-10-CM

## 2025-05-20 DIAGNOSIS — M54.42 CHRONIC BILATERAL LOW BACK PAIN WITH BILATERAL SCIATICA: Primary | ICD-10-CM

## 2025-05-20 DIAGNOSIS — M54.41 CHRONIC BILATERAL LOW BACK PAIN WITH BILATERAL SCIATICA: Primary | ICD-10-CM

## 2025-05-20 DIAGNOSIS — R29.3 POOR POSTURE: ICD-10-CM

## 2025-05-20 DIAGNOSIS — G89.29 CHRONIC NECK PAIN: ICD-10-CM

## 2025-05-20 DIAGNOSIS — R26.2 DIFFICULTY WALKING: ICD-10-CM

## 2025-05-20 DIAGNOSIS — M54.2 CHRONIC NECK PAIN: ICD-10-CM

## 2025-05-20 NOTE — PROGRESS NOTES
Physical Therapy Daily Treatment Note    [facility]  [address] [place] [address]  [phone] (phone)  [phone] (fax)    Patient: Branden Brady   : 1950  Diagnosis/ICD-10 Code:  Chronic bilateral low back pain with bilateral sciatica [M54.42, M54.41, G89.29]  Referring practitioner: Panfilo Roland MD  Today's Date: 2025  Patient seen for 13 sessions    Visit Diagnoses:    ICD-10-CM ICD-9-CM   1. Chronic bilateral low back pain with bilateral sciatica  M54.42 724.2    M54.41 724.3    G89.29 338.29   2. Difficulty walking  R26.2 719.7   3. Poor posture  R29.3 781.92   4. Chronic neck pain  M54.2 723.1    G89.29 338.29              Subjective: Greg stated that he noticed a little less neck pain from the last treatment and feels the exercises are benefiting him.  The L shoulder is also showing some improved mobility.     Objective     Treatment    Therapeutic exercise  CV flexion x 20  Neck rotation, with CV flexion, x 10, B  Core bracing, 30s x 3  Hip adductor ball squeeze 10s x 10  Hip clam, B, in hook lying, blue theraband, x 20    NMR: verbal cues for exercise technique were given    Manual therapy: in supine, legs on bolster, cervical spine decompression/STM intermittently followed by L UE nerve gliding, followed by lumbar spine decompression, B passive SLRs and sciatic nerve gliding x 10, B    Assessment & Plan       Assessment  Assessment details: Greg is benefiting from the addition of the cervical spine treatment.  He required minimal cues for exercise technique and tolerated treatment well.     Plan  Plan details: Continue per treatment plan.                Timed:    Manual Therapy:    15     mins  72924;  Therapeutic Exercise:    25     mins  11487;     Neuromuscular Stephy:    2    mins  30765;    Therapeutic Activity:          mins  92896;     Gait Training:             mins  04840;     Ultrasound:           mins  80268;    Self Care                                mins   43893  Orthotic Fitting (initial)  _____ mins 17024  Orthotic Fitting (follow-up) _____ mins 36902      Untimed:    Electrical Stimulation:           mins  19372 ( );  Traction:           mins  50040;   Dry Needling  (1-2 muscles)                  mins 20560 (Self-pay)  Dry Needling (3-4 muscles)  ____  20561 (Self-pay)  Dry Needling Trial             DRYNDLTRIAL  (No Charge)  Canalith Repositioning _____ mins 75434    Timed Treatment:   42   mins   Total Treatment:     42   mins    Gigi Wick PT  Physical Therapist    [place] License[place] PT [id number]

## 2025-05-27 ENCOUNTER — TREATMENT (OUTPATIENT)
Dept: PHYSICAL THERAPY | Facility: CLINIC | Age: 75
End: 2025-05-27
Payer: MEDICARE

## 2025-05-27 DIAGNOSIS — R26.2 DIFFICULTY WALKING: ICD-10-CM

## 2025-05-27 DIAGNOSIS — M54.41 CHRONIC BILATERAL LOW BACK PAIN WITH BILATERAL SCIATICA: Primary | ICD-10-CM

## 2025-05-27 DIAGNOSIS — G89.29 CHRONIC NECK PAIN: ICD-10-CM

## 2025-05-27 DIAGNOSIS — G89.29 CHRONIC BILATERAL LOW BACK PAIN WITH BILATERAL SCIATICA: Primary | ICD-10-CM

## 2025-05-27 DIAGNOSIS — M54.42 CHRONIC BILATERAL LOW BACK PAIN WITH BILATERAL SCIATICA: Primary | ICD-10-CM

## 2025-05-27 DIAGNOSIS — M54.2 CHRONIC NECK PAIN: ICD-10-CM

## 2025-05-27 DIAGNOSIS — R29.3 POOR POSTURE: ICD-10-CM

## 2025-05-27 PROCEDURE — 97110 THERAPEUTIC EXERCISES: CPT | Performed by: PHYSICAL THERAPIST

## 2025-05-27 PROCEDURE — 97140 MANUAL THERAPY 1/> REGIONS: CPT | Performed by: PHYSICAL THERAPIST

## 2025-05-27 NOTE — PROGRESS NOTES
Physical Therapy Daily Treatment Note    [facility]  [address] [place] [address]  [phone] (phone)  [phone] (fax)    Patient: Branden Brady   : 1950  Diagnosis/ICD-10 Code:  Chronic bilateral low back pain with bilateral sciatica [M54.42, M54.41, G89.29]  Referring practitioner: Panfilo Roland MD  Today's Date: 2025  Patient seen for 14 sessions    Visit Diagnoses:    ICD-10-CM ICD-9-CM   1. Chronic bilateral low back pain with bilateral sciatica  M54.42 724.2    M54.41 724.3    G89.29 338.29   2. Difficulty walking  R26.2 719.7   3. Poor posture  R29.3 781.92   4. Chronic neck pain  M54.2 723.1    G89.29 338.29              Subjective:  Greg stated that he is noticing less neck pain but this morning is having some posterior R thigh sciatic type symptoms.     Objective     Treatment    Therapeutic exercise  CV flexion x 20  Neck rotation, with CV flexion, x 10, B  Core bracing, 30s x 2  Hip adductor ball squeeze 10s x 10  Hip clam, B, in hook lying, blue theraband, x 20  6. Modified brdige, 10s x 10    NMR: verbal cues for exercise technique were given     Manual therapy: in supine, legs on bolster, cervical spine decompression/STM intermittently followed by L UE nerve gliding, followed by lumbar spine decompression, B passive SLRs and sciatic nerve gliding x 10, B    Assessment & Plan       Assessment  Assessment details: Greg's exercise technique is improving. The attention to his cervical spine has helped with discomfort there.  The sciatic symptoms still remain but function has improved up on his feet.     Plan  Plan details: Continue per treatment plan               Timed:    Manual Therapy:    10     mins  47239;  Therapeutic Exercise:    30     mins  31664;     Neuromuscular Stephy:    5    mins  31305;    Therapeutic Activity:          mins  61563;     Gait Training:             mins  63181;     Ultrasound:           mins  31892;     Self Care                               mins   25099  Orthotic Fitting (initial)  _____ mins 37037  Orthotic Fitting (follow-up) _____ mins 41254      Untimed:    Electrical Stimulation:           mins  05121 ( );  Traction:           mins  68890;   Dry Needling  (1-2 muscles)                  mins 79173 (Self-pay)  Dry Needling (3-4 muscles)  ____  20561 (Self-pay)  Dry Needling Trial             DRYNDLTRIAL  (No Charge)  Canalith Repositioning _____ mins 18790    Timed Treatment:   45   mins   Total Treatment:     45   mins    Gigi Wick PT  Physical Therapist    [place] [id number]

## 2025-06-03 ENCOUNTER — TREATMENT (OUTPATIENT)
Dept: PHYSICAL THERAPY | Facility: CLINIC | Age: 75
End: 2025-06-03
Payer: MEDICARE

## 2025-06-03 ENCOUNTER — TRANSCRIBE ORDERS (OUTPATIENT)
Dept: ADMINISTRATIVE | Facility: HOSPITAL | Age: 75
End: 2025-06-03
Payer: MEDICARE

## 2025-06-03 DIAGNOSIS — G89.29 CHRONIC NECK PAIN: ICD-10-CM

## 2025-06-03 DIAGNOSIS — M54.41 CHRONIC BILATERAL LOW BACK PAIN WITH BILATERAL SCIATICA: Primary | ICD-10-CM

## 2025-06-03 DIAGNOSIS — R29.3 POOR POSTURE: ICD-10-CM

## 2025-06-03 DIAGNOSIS — R26.2 DIFFICULTY WALKING: ICD-10-CM

## 2025-06-03 DIAGNOSIS — M54.42 CHRONIC BILATERAL LOW BACK PAIN WITH BILATERAL SCIATICA: Primary | ICD-10-CM

## 2025-06-03 DIAGNOSIS — M54.2 CHRONIC NECK PAIN: ICD-10-CM

## 2025-06-03 DIAGNOSIS — G89.29 CHRONIC BILATERAL LOW BACK PAIN WITH BILATERAL SCIATICA: Primary | ICD-10-CM

## 2025-06-03 DIAGNOSIS — I71.21 ANEURYSM OF THE ASCENDING AORTA, WITHOUT RUPTURE: Primary | ICD-10-CM

## 2025-06-03 PROCEDURE — 97110 THERAPEUTIC EXERCISES: CPT | Performed by: PHYSICAL THERAPIST

## 2025-06-03 PROCEDURE — 97140 MANUAL THERAPY 1/> REGIONS: CPT | Performed by: PHYSICAL THERAPIST

## 2025-06-03 NOTE — PROGRESS NOTES
Physical Therapy Daily Treatment Note    [facility]  [address] [place] [address]  [phone] (phone)  [phone] (fax)    Patient: Branden Brady   : 1950  Diagnosis/ICD-10 Code:  Chronic bilateral low back pain with bilateral sciatica [M54.42, M54.41, G89.29]  Referring practitioner: Panfilo Roland MD  Today's Date: 6/3/2025  Patient seen for 15 sessions    Visit Diagnoses:    ICD-10-CM ICD-9-CM   1. Chronic bilateral low back pain with bilateral sciatica  M54.42 724.2    M54.41 724.3    G89.29 338.29   2. Difficulty walking  R26.2 719.7   3. Poor posture  R29.3 781.92   4. Chronic neck pain  M54.2 723.1    G89.29 338.29              Subjective:   Greg stated that he was having pain in the L lower neck and upper shoulder area.     Objective     First rib assessment: in sitting, L abnormal, R normal    Treatment    Manual therapy:  in sitting, L first rib manipulation, prior to exercise, after exercise, in supine, legs on bolster, cervical spine decompression/STM intermittently followed by L UE nerve gliding, followed by lumbar spine decompression, B passive SLRs and sciatic nerve gliding x 10, B     Therapeutic exercise  CV flexion x 20  Neck rotation, with CV flexion, x 10, B  Full neck, with CV flexion, x 10  Core bracing, 30s x 2  Modified bridge, 30s x 5  Hip adductor ball squeeze 10s x 10  Hip clam, B, in hook lying, blue theraband, x 20    NMR: verbal cues for the full flexion were given.      Assessment & Plan       Assessment  Assessment details: Greg required verbal cues for exercise technique.  He tolerated treatment well.     Plan  Plan details: Continue and add the neck flexion to his HEP next visit.                Timed:    Manual Therapy:    15     mins  99632;  Therapeutic Exercise:    25     mins  61546;     Neuromuscular Stephy:    2    mins  67729;    Therapeutic Activity:          mins  06866;     Gait Training:             mins   08806;     Ultrasound:           mins  80440;    Self Care                               mins   36043  Orthotic Fitting (initial)  _____ mins 23760  Orthotic Fitting (follow-up) _____ mins 62481      Untimed:    Electrical Stimulation:           mins  67785 ( );  Traction:           mins  86628;   Dry Needling  (1-2 muscles)                  mins 87059 (Self-pay)  Dry Needling (3-4 muscles)  ____  20561 (Self-pay)  Dry Needling Trial             DRYNDLTRIAL  (No Charge)  Canalith Repositioning _____ mins 69646    Timed Treatment:   42   mins   Total Treatment:     42   mins    Gigi Wick PT  Physical Therapist    [place] License[place] PT [id number]

## 2025-06-30 ENCOUNTER — TREATMENT (OUTPATIENT)
Dept: PHYSICAL THERAPY | Facility: CLINIC | Age: 75
End: 2025-06-30
Payer: MEDICARE

## 2025-06-30 DIAGNOSIS — G89.29 CHRONIC BILATERAL LOW BACK PAIN WITH BILATERAL SCIATICA: Primary | ICD-10-CM

## 2025-06-30 DIAGNOSIS — M54.2 CHRONIC NECK PAIN: ICD-10-CM

## 2025-06-30 DIAGNOSIS — M54.41 CHRONIC BILATERAL LOW BACK PAIN WITH BILATERAL SCIATICA: Primary | ICD-10-CM

## 2025-06-30 DIAGNOSIS — M54.42 CHRONIC BILATERAL LOW BACK PAIN WITH BILATERAL SCIATICA: Primary | ICD-10-CM

## 2025-06-30 DIAGNOSIS — G89.29 CHRONIC NECK PAIN: ICD-10-CM

## 2025-06-30 DIAGNOSIS — R29.3 POOR POSTURE: ICD-10-CM

## 2025-06-30 DIAGNOSIS — R26.2 DIFFICULTY WALKING: ICD-10-CM

## 2025-06-30 PROCEDURE — 97535 SELF CARE MNGMENT TRAINING: CPT | Performed by: PHYSICAL THERAPIST

## 2025-06-30 PROCEDURE — 97140 MANUAL THERAPY 1/> REGIONS: CPT | Performed by: PHYSICAL THERAPIST

## 2025-06-30 PROCEDURE — 97110 THERAPEUTIC EXERCISES: CPT | Performed by: PHYSICAL THERAPIST

## 2025-07-02 ENCOUNTER — OFFICE VISIT (OUTPATIENT)
Dept: NEUROSURGERY | Facility: CLINIC | Age: 75
End: 2025-07-02
Payer: MEDICARE

## 2025-07-02 VITALS
DIASTOLIC BLOOD PRESSURE: 70 MMHG | HEART RATE: 54 BPM | OXYGEN SATURATION: 97 % | HEIGHT: 72 IN | SYSTOLIC BLOOD PRESSURE: 94 MMHG | WEIGHT: 190 LBS | BODY MASS INDEX: 25.73 KG/M2

## 2025-07-02 DIAGNOSIS — M51.360 DEGENERATION OF INTERVERTEBRAL DISC OF LUMBAR REGION WITH DISCOGENIC BACK PAIN: Primary | ICD-10-CM

## 2025-07-02 DIAGNOSIS — M43.16 SPONDYLOLISTHESIS AT L3-L4 LEVEL: ICD-10-CM

## 2025-07-02 DIAGNOSIS — M62.81 CALF MUSCLE WEAKNESS: ICD-10-CM

## 2025-07-02 DIAGNOSIS — M48.062 SPINAL STENOSIS OF LUMBAR REGION WITH NEUROGENIC CLAUDICATION: ICD-10-CM

## 2025-07-02 PROCEDURE — 1159F MED LIST DOCD IN RCRD: CPT | Performed by: NEUROLOGICAL SURGERY

## 2025-07-02 PROCEDURE — 99214 OFFICE O/P EST MOD 30 MIN: CPT | Performed by: NEUROLOGICAL SURGERY

## 2025-07-02 PROCEDURE — 1160F RVW MEDS BY RX/DR IN RCRD: CPT | Performed by: NEUROLOGICAL SURGERY

## 2025-07-02 NOTE — PROGRESS NOTES
Subjective   Patient ID: Branden Brady is a 75 y.o. male is here today for follow-up w/ xrays.    History of Present Illness    This gentleman has known severe spinal stenosis at L3-L4 with a spondylolisthesis.  He has chronically weak calves but he can stand and walk up to 7 miles.  He did that while he was working at the [employer] this year.  He did not get the [place] [facility].  I encouraged him to get it.  But he has been having little bit more radicular pain in the last few months.  Still does not limit his walking but he says it is more painful.  He he has the chronic calf weakness but no weakness in his quadriceps.  I think it is time to consider epidural blocks given the extension down his legs which he has not really described before.  Will ask the pain clinic to do bilateral L3-L4 transforaminal blocks and then come back in 6 months.  If things get worse and he cannot function then we will probably looking at an L3-L4 decompression and fusion.  But again right now despite his pain he still remains functional and continues to exercise on his own.    The following portions of the patient's history were reviewed and updated as appropriate: allergies, current medications, past family history, past medical history, past social history, past surgical history, and problem list.    Review of Systems   All other systems reviewed and are negative.      Objective   Physical Exam  Constitutional:       General: He is awake.      Appearance: He is well-developed.   HENT:      Head: Normocephalic and atraumatic.   Eyes:      General: Lids are normal.      Extraocular Movements: Extraocular movements intact.      Conjunctiva/sclera: Conjunctivae normal.      Pupils: Pupils are equal, round, and reactive to light.   Neck:      Vascular: No carotid bruit.   Neurological:      Mental Status: He is alert.      Coordination: Coordination is intact.      Deep Tendon Reflexes:      Reflex Scores:       Tricep reflexes are 2+ on the  right side and 2+ on the left side.       Bicep reflexes are 2+ on the right side and 2+ on the left side.       Brachioradialis reflexes are 2+ on the right side and 2+ on the left side.       Patellar reflexes are 2+ on the right side and 2+ on the left side.       Achilles reflexes are 2+ on the right side and 2+ on the left side.  Psychiatric:         Speech: Speech normal.       Neurological Exam  Mental Status  Awake and alert. Oriented only to person, place, time and situation. Recent and remote memory are intact. Speech is normal. Language is fluent with no aphasia. Attention and concentration are normal. Fund of knowledge is appropriate for level of education.    Cranial Nerves  CN II: Visual acuity is normal. Visual fields full to confrontation.  CN III, IV, VI: Extraocular movements intact bilaterally. Normal lids and orbits bilaterally. Pupils equal round and reactive to light bilaterally.  CN V: Facial sensation is normal.  CN VII: Full and symmetric facial movement.  CN IX, X: Palate elevates symmetrically. Normal gag reflex.  CN XI: Shoulder shrug strength is normal.  CN XII: Tongue midline without atrophy or fasciculations.    Motor  Normal muscle bulk throughout. Normal muscle tone.                                               Right                     Left  Rhomboids                            5                          5  Infraspinatus                          5                          5  Supraspinatus                       5                          5  Deltoid                                   5                          5   Biceps                                   5                          5  Brachioradialis                      5                          5   Triceps                                  5                          5   Pronator                                5                          5   Supinator                              5                           5   Wrist flexor                             5                          5   Wrist extensor                       5                          5   Finger flexor                          5                          5   Finger extensor                     5                          5   Interossei                              5                          5   Abductor pollicis brevis         5                          5   Flexor pollicis brevis             5                          5   Opponens pollicis                 5                          5  Extensor digitorum               5                          5  Abductor digiti minimi           5                          5   Abdominal                            5                          5  Glutei                                    5                          5  Hip abductor                         5                          5  Hip adductor                         5                          5   Iliopsoas                               5                          5   Quadriceps                           5                          5   Hamstring                             5                          5   Gastrocnemius                     4                           4   Anterior tibialis                      5                          5   Posterior tibialis                    5                          5   Peroneal                               5                          5  Ankle dorsiflexor                   5                          5  Ankle plantar flexor              5                           5  Extensor hallucis longus      5                           5    Sensory  Light touch is normal in upper and lower extremities. Proprioception is normal in upper and lower extremities.     Reflexes                                            Right                      Left  Brachioradialis                    2+                         2+  Biceps                                 2+                         2+  Triceps                                 2+                         2+  Finger flex                           2+                         2+  Hamstring                            2+                         2+  Patellar                                2+                         2+  Achilles                                2+                         2+    Coordination    Finger-to-nose, rapid alternating movements and heel-to-shin normal bilaterally without dysmetria.    Gait  Casual gait is normal including stance, stride, and arm swing.Normal toe walking. Normal heel walking. Normal tandem gait.       Assessment & Plan   Independent Review of Radiographic Studies:      The lumbar MRI and x-rays done on 3/26/2024 does show some progression of the spinal stenosis at L2-L3 and L4-L5.  At L3-L4 there is disc base collapse at L3-L4 with a spondylolisthesis and severe stenosis.  The x-rays today do not show any instability and some mild scoliosis.    Medical Decision Making:      Will asked the pain clinic to do bilateral L3-L4 transforaminal epidural blocks.  Hopefully that will help his leg pain.  Will have him come back in 6 months.  If the blocks do not work and he feels that he is no longer functioning then we will need to consider reimaging and a fusion surgery at L3-L4, possibly other levels.    Diagnoses and all orders for this visit:    1. Degeneration of intervertebral disc of lumbar region with discogenic back pain (Primary)  -     Epidural Block    2. Spondylolisthesis at L3-L4 level  -     Epidural Block    3. Calf muscle weakness  -     Epidural Block    4. Spinal stenosis of lumbar region with neurogenic claudication  -     Epidural Block      Return in about 6 months (around 1/2/2026) for face to face.

## 2025-07-15 ENCOUNTER — HOSPITAL ENCOUNTER (OUTPATIENT)
Dept: CT IMAGING | Facility: HOSPITAL | Age: 75
Discharge: HOME | End: 2025-07-15
Admitting: THORACIC SURGERY (CARDIOTHORACIC VASCULAR SURGERY)
Payer: MEDICARE

## 2025-07-15 DIAGNOSIS — I71.21 ANEURYSM OF THE ASCENDING AORTA, WITHOUT RUPTURE: ICD-10-CM

## 2025-07-15 PROCEDURE — 71250 CT THORAX DX C-: CPT

## 2025-07-21 ENCOUNTER — HOSPITAL ENCOUNTER (OUTPATIENT)
Dept: PAIN MEDICINE | Facility: HOSPITAL | Age: 75
Discharge: HOME | End: 2025-07-21
Admitting: STUDENT IN AN ORGANIZED HEALTH CARE EDUCATION/TRAINING PROGRAM
Payer: MEDICARE

## 2025-07-21 ENCOUNTER — ANESTHESIA EVENT (OUTPATIENT)
Dept: PAIN MEDICINE | Facility: HOSPITAL | Age: 75
End: 2025-07-21
Payer: MEDICARE

## 2025-07-21 ENCOUNTER — ANESTHESIA (OUTPATIENT)
Dept: PAIN MEDICINE | Facility: HOSPITAL | Age: 75
End: 2025-07-21
Payer: MEDICARE

## 2025-07-21 DIAGNOSIS — M62.81 CALF MUSCLE WEAKNESS: ICD-10-CM

## 2025-07-21 DIAGNOSIS — M48.062 SPINAL STENOSIS OF LUMBAR REGION WITH NEUROGENIC CLAUDICATION: ICD-10-CM

## 2025-07-21 DIAGNOSIS — M51.360 DEGENERATION OF INTERVERTEBRAL DISC OF LUMBAR REGION WITH DISCOGENIC BACK PAIN: Primary | ICD-10-CM

## 2025-07-21 DIAGNOSIS — M43.16 SPONDYLOLISTHESIS AT L3-L4 LEVEL: ICD-10-CM

## 2025-07-21 PROCEDURE — G0463 HOSPITAL OUTPT CLINIC VISIT: HCPCS

## 2025-07-21 NOTE — ANESTHESIA PROCEDURE NOTES
Consult  Consult performed by: Tami Patel MD  Consult ordered by: Tami Patel MD  Reason for consult: Preop consult for transforaminal lumbar epidural

## 2025-07-21 NOTE — H&P
CHIEF COMPLAINT:   Chronic back pain    HISTORY OF PRESENT ILLNESS:  Mr. Brady is a 75-year-old male who presents with a chief complaint of chronic low back pain for multiple years.  He is a patient of Dr. Roland in the neurosurgery clinic who recommended bilateral transforaminal epidural at L3-4.  His lumbar MRI from 3/26/2024 was reviewed showing prominent lumbar scoliosis with apex at L3-4.  There is significant spondylolisthesis and significant spinal stenosis at this level.    The patient's pain is located in the low back at the waist and radiates to bilateral posterior lower extremity.  Their pain is a 2-5/10.  Right now the patient reports that his pain is tolerable and controllable with NSAIDs, but he is trying to maintain his lifestyle and activity level.  He has not been able to exercise or run due to his pain.  The symptom is described as ache.  The pain is stable in severity.   Their symptoms are exacerbated by activity and alleviated by rest and NSAIDs.    The conservative measures the patient has attempted recently without significant improvement include: Rest, ice/heat, OTC medications, physical therapy.       PAST MEDICAL HISTORY:  Current Outpatient Medications on File Prior to Encounter   Medication Sig Dispense Refill    Evolocumab (Repatha SureClick) solution auto-injector SureClick injection Inject 1 mL under the skin into the appropriate area as directed.      ezetimibe (ZETIA) 10 MG tablet Take 1 tablet by mouth Daily.      Multiple Vitamins-Minerals (MULTIVITAMIN ADULT PO) Take  by mouth Daily.      tretinoin (RETIN-A) 0.05 % cream Every Night.  5    influenza vac split high-dose (Fluzone High-Dose) 0.5 ML suspension prefilled syringe injection Inject 0.5 mL into the appropriate muscle as directed by prescriber. 0.5 mL 0    levothyroxine (SYNTHROID, LEVOTHROID) 25 MCG tablet  (Patient not taking: Reported on 12/2/2024)      levothyroxine (SYNTHROID, LEVOTHROID) 50 MCG tablet  (Patient  not taking: Reported on 12/2/2024)       No current facility-administered medications on file prior to encounter.       Past Medical History:   Diagnosis Date    Allergic GI irritation with aspirin    Arthritis     Cervical, thoracic and lumbar spine    Dry eyes     Exertional shortness of breath     Family history of ischemic heart disease     Hyperlipidemia     Osteopenia     Otalgia     Subdeltoid bursitis     Thoracic compression fracture          SOCIAL HISTORY:  Negative tobacco product use    REVIEW OF SYSTEMS:  No hematologic infectious or constitutional symptoms  Other review of systems non-contributory  Negative screen for GARCIA      PHYSICAL EXAM:  There were no vitals taken for this visit.  Well-developed, well-nourished, no acute distress  Alert and oriented ×3  Extra ocular movements intact  Airway: Mallampati 2  Unlabored respirations  Extremities warm and well-perfused  Deep tendon reflexes normal in the bilateral patella  Negative straight leg raise  5 out of 5 strength bilateral upper and lower extremities  Lumbar spine without obvious deformities or ecchymoses  Lumbar spine nontender to palpation      DIAGNOSIS:  Post-Op Diagnosis Codes:     * Spinal stenosis of lumbar region with neurogenic claudication [M48.062]    PLAN:  1.  Lumbar 3-4 transforaminal epidural steroid injections, up to 3. If pain control is acceptable after 1 or 2 injections, it was discussed with the patient that they may return for the subsequent injections if and when their pain returns.  The risks were discussed with the patient including failure of relief, worsening pain, Headache (post dural puncture headache), bleeding (epidural hematoma) and infection (epidural abscess or skin infection).  2.  Physical therapy exercises at home as prescribed by physical therapy or from the pain clinic handout.  Continuation of these exercises every day, or multiple times per week, even when the patient has good pain relief, was stressed to  the patient as a preventative measure to decrease the frequency and severity of future pain episodes.  3.  Continue pain medicines as already prescribed.  If patient not currently taking any, it is recommended to begin Acetaminophen 1000 mg po q 8 hours.  If other medicines containing Acetaminophen are currently prescribed, maintain daily dose at 3000 mg.    4.  If they can tolerate NSAIDS, it is recommended to take Ibuprofen 600 mg po q 6 hours for 7 days during pain exacerbations.  Alternatively, they may substitute an NSAID of their choice (e.g. Aleve).  This may be taken at the same time as Acetaminophen.  5.  Heat and ice to the affected area as tolerated for pain control.    6.  Daily low impact exercise such as walking or water exercise was recommended to maintain overall health and aid in weight control.   7.  Follow up as needed for subsequent injections.

## 2025-07-28 ENCOUNTER — TELEPHONE (OUTPATIENT)
Dept: PHYSICAL THERAPY | Facility: CLINIC | Age: 75
End: 2025-07-28

## 2025-07-28 NOTE — TELEPHONE ENCOUNTER
Caller: Branden Brady    Relationship: Self    Best call back number:   Telephone Information:   Mobile [phone]          What was the call regarding: WOULD LIKE A CALL BACK REGARDING HIS MY CHART MESSAGE ABOUT APPT BEING CANCELED, READ NOT ON CHART TO HIM BUT HE WOULD STILL LIKE A CALL